# Patient Record
Sex: FEMALE | Race: WHITE | Employment: OTHER | URBAN - METROPOLITAN AREA
[De-identification: names, ages, dates, MRNs, and addresses within clinical notes are randomized per-mention and may not be internally consistent; named-entity substitution may affect disease eponyms.]

---

## 2017-01-06 ENCOUNTER — OFFICE VISIT (OUTPATIENT)
Dept: FAMILY MEDICINE CLINIC | Age: 62
End: 2017-01-06

## 2017-01-06 VITALS
DIASTOLIC BLOOD PRESSURE: 70 MMHG | BODY MASS INDEX: 36.09 KG/M2 | SYSTOLIC BLOOD PRESSURE: 132 MMHG | OXYGEN SATURATION: 97 % | HEART RATE: 79 BPM | WEIGHT: 191 LBS

## 2017-01-06 DIAGNOSIS — M25.461 KNEE EFFUSION, RIGHT: Primary | ICD-10-CM

## 2017-01-06 PROCEDURE — 20610 DRAIN/INJ JOINT/BURSA W/O US: CPT | Performed by: FAMILY MEDICINE

## 2017-01-06 RX ORDER — TRIAMCINOLONE ACETONIDE 40 MG/ML
40 INJECTION, SUSPENSION INTRA-ARTICULAR; INTRAMUSCULAR ONCE
Status: COMPLETED | OUTPATIENT
Start: 2017-01-06 | End: 2017-01-06

## 2017-01-06 RX ORDER — TRAMADOL HYDROCHLORIDE 50 MG/1
TABLET ORAL
Qty: 90 TABLET | Refills: 2 | Status: SHIPPED | OUTPATIENT
Start: 2017-01-06 | End: 2017-06-06 | Stop reason: SDUPTHER

## 2017-01-06 RX ADMIN — TRIAMCINOLONE ACETONIDE 40 MG: 40 INJECTION, SUSPENSION INTRA-ARTICULAR; INTRAMUSCULAR at 10:27

## 2017-01-25 ENCOUNTER — OFFICE VISIT (OUTPATIENT)
Dept: ORTHOPEDIC SURGERY | Age: 62
End: 2017-01-25

## 2017-01-25 VITALS
HEART RATE: 69 BPM | BODY MASS INDEX: 36.09 KG/M2 | DIASTOLIC BLOOD PRESSURE: 67 MMHG | WEIGHT: 191 LBS | SYSTOLIC BLOOD PRESSURE: 107 MMHG

## 2017-01-25 DIAGNOSIS — G56.03 BILATERAL CARPAL TUNNEL SYNDROME: Primary | ICD-10-CM

## 2017-01-25 PROCEDURE — 99243 OFF/OP CNSLTJ NEW/EST LOW 30: CPT | Performed by: ORTHOPAEDIC SURGERY

## 2017-01-31 ENCOUNTER — OFFICE VISIT (OUTPATIENT)
Dept: FAMILY MEDICINE CLINIC | Age: 62
End: 2017-01-31

## 2017-01-31 VITALS
DIASTOLIC BLOOD PRESSURE: 78 MMHG | BODY MASS INDEX: 35.3 KG/M2 | SYSTOLIC BLOOD PRESSURE: 126 MMHG | HEIGHT: 61 IN | WEIGHT: 187 LBS | OXYGEN SATURATION: 98 % | HEART RATE: 66 BPM

## 2017-01-31 DIAGNOSIS — Z01.818 PRE-OP EVALUATION: Primary | ICD-10-CM

## 2017-01-31 DIAGNOSIS — E78.00 PURE HYPERCHOLESTEROLEMIA: ICD-10-CM

## 2017-01-31 DIAGNOSIS — G47.33 OBSTRUCTIVE SLEEP APNEA SYNDROME: ICD-10-CM

## 2017-01-31 DIAGNOSIS — E11.9 TYPE 2 DIABETES MELLITUS WITHOUT COMPLICATION, WITHOUT LONG-TERM CURRENT USE OF INSULIN (HCC): ICD-10-CM

## 2017-01-31 PROCEDURE — 99213 OFFICE O/P EST LOW 20 MIN: CPT | Performed by: FAMILY MEDICINE

## 2017-01-31 PROCEDURE — 93000 ELECTROCARDIOGRAM COMPLETE: CPT | Performed by: FAMILY MEDICINE

## 2017-02-06 RX ORDER — LISINOPRIL 10 MG/1
TABLET ORAL
Qty: 90 TABLET | Refills: 1 | Status: SHIPPED | OUTPATIENT
Start: 2017-02-06 | End: 2017-08-01 | Stop reason: SDUPTHER

## 2017-02-06 RX ORDER — PRAVASTATIN SODIUM 20 MG
TABLET ORAL
Qty: 90 TABLET | Refills: 1 | Status: SHIPPED | OUTPATIENT
Start: 2017-02-06 | End: 2017-06-13 | Stop reason: SDUPTHER

## 2017-02-06 RX ORDER — OMEPRAZOLE 40 MG/1
40 CAPSULE, DELAYED RELEASE ORAL 2 TIMES DAILY
Qty: 180 CAPSULE | Refills: 1 | Status: SHIPPED | OUTPATIENT
Start: 2017-02-06 | End: 2017-08-01 | Stop reason: SDUPTHER

## 2017-02-14 ENCOUNTER — HOSPITAL ENCOUNTER (OUTPATIENT)
Dept: SURGERY | Age: 62
Discharge: HOME | End: 2017-02-14
Attending: ORTHOPAEDIC SURGERY | Admitting: ORTHOPAEDIC SURGERY

## 2017-02-14 VITALS
RESPIRATION RATE: 18 BRPM | SYSTOLIC BLOOD PRESSURE: 102 MMHG | DIASTOLIC BLOOD PRESSURE: 64 MMHG | TEMPERATURE: 98 F | HEART RATE: 76 BPM | OXYGEN SATURATION: 96 %

## 2017-02-14 LAB
GLUCOSE BLD-MCNC: 114 MG/DL (ref 70–99)
GLUCOSE BLD-MCNC: 126 MG/DL (ref 70–99)
PERFORMED ON: ABNORMAL
PERFORMED ON: ABNORMAL

## 2017-02-14 RX ORDER — MEPERIDINE HYDROCHLORIDE 25 MG/ML
12.5 INJECTION INTRAMUSCULAR; INTRAVENOUS; SUBCUTANEOUS EVERY 5 MIN PRN
Status: DISCONTINUED | OUTPATIENT
Start: 2017-02-14 | End: 2017-02-15 | Stop reason: HOSPADM

## 2017-02-14 RX ORDER — OXYCODONE HYDROCHLORIDE AND ACETAMINOPHEN 5; 325 MG/1; MG/1
1 TABLET ORAL PRN
Status: COMPLETED | OUTPATIENT
Start: 2017-02-14 | End: 2017-02-14

## 2017-02-14 RX ORDER — SODIUM CHLORIDE 9 MG/ML
INJECTION, SOLUTION INTRAVENOUS CONTINUOUS
Status: DISCONTINUED | OUTPATIENT
Start: 2017-02-14 | End: 2017-02-15 | Stop reason: HOSPADM

## 2017-02-14 RX ORDER — SODIUM CHLORIDE 0.9 % (FLUSH) 0.9 %
10 SYRINGE (ML) INJECTION EVERY 12 HOURS SCHEDULED
Status: DISCONTINUED | OUTPATIENT
Start: 2017-02-14 | End: 2017-02-15 | Stop reason: HOSPADM

## 2017-02-14 RX ORDER — OXYCODONE HYDROCHLORIDE AND ACETAMINOPHEN 5; 325 MG/1; MG/1
2 TABLET ORAL PRN
Status: COMPLETED | OUTPATIENT
Start: 2017-02-14 | End: 2017-02-14

## 2017-02-14 RX ORDER — MORPHINE SULFATE 2 MG/ML
1 INJECTION, SOLUTION INTRAMUSCULAR; INTRAVENOUS EVERY 5 MIN PRN
Status: DISCONTINUED | OUTPATIENT
Start: 2017-02-14 | End: 2017-02-15 | Stop reason: HOSPADM

## 2017-02-14 RX ORDER — SODIUM CHLORIDE 0.9 % (FLUSH) 0.9 %
10 SYRINGE (ML) INJECTION PRN
Status: DISCONTINUED | OUTPATIENT
Start: 2017-02-14 | End: 2017-02-15 | Stop reason: HOSPADM

## 2017-02-14 RX ORDER — MORPHINE SULFATE 2 MG/ML
2 INJECTION, SOLUTION INTRAMUSCULAR; INTRAVENOUS EVERY 5 MIN PRN
Status: DISCONTINUED | OUTPATIENT
Start: 2017-02-14 | End: 2017-02-15 | Stop reason: HOSPADM

## 2017-02-14 RX ORDER — ONDANSETRON 2 MG/ML
4 INJECTION INTRAMUSCULAR; INTRAVENOUS
Status: ACTIVE | OUTPATIENT
Start: 2017-02-14 | End: 2017-02-14

## 2017-02-14 RX ORDER — FENTANYL CITRATE 50 UG/ML
25 INJECTION, SOLUTION INTRAMUSCULAR; INTRAVENOUS EVERY 5 MIN PRN
Status: DISCONTINUED | OUTPATIENT
Start: 2017-02-14 | End: 2017-02-15 | Stop reason: HOSPADM

## 2017-02-14 RX ORDER — FENTANYL CITRATE 50 UG/ML
50 INJECTION, SOLUTION INTRAMUSCULAR; INTRAVENOUS EVERY 5 MIN PRN
Status: DISCONTINUED | OUTPATIENT
Start: 2017-02-14 | End: 2017-02-15 | Stop reason: HOSPADM

## 2017-02-14 RX ADMIN — OXYCODONE HYDROCHLORIDE AND ACETAMINOPHEN 2 TABLET: 5; 325 TABLET ORAL at 14:32

## 2017-02-14 RX ADMIN — SODIUM CHLORIDE: 9 INJECTION, SOLUTION INTRAVENOUS at 11:38

## 2017-02-14 ASSESSMENT — PAIN - FUNCTIONAL ASSESSMENT: PAIN_FUNCTIONAL_ASSESSMENT: 0-10

## 2017-02-14 ASSESSMENT — ENCOUNTER SYMPTOMS: SHORTNESS OF BREATH: 0

## 2017-02-14 ASSESSMENT — PAIN SCALES - GENERAL: PAINLEVEL_OUTOF10: 9

## 2017-02-15 ENCOUNTER — TELEPHONE (OUTPATIENT)
Dept: ORTHOPEDIC SURGERY | Age: 62
End: 2017-02-15

## 2017-02-22 ENCOUNTER — OFFICE VISIT (OUTPATIENT)
Dept: ORTHOPEDIC SURGERY | Age: 62
End: 2017-02-22

## 2017-02-22 VITALS — RESPIRATION RATE: 16 BRPM | BODY MASS INDEX: 34.36 KG/M2 | WEIGHT: 182 LBS | HEIGHT: 61 IN

## 2017-02-22 DIAGNOSIS — G56.03 BILATERAL CARPAL TUNNEL SYNDROME: Primary | ICD-10-CM

## 2017-02-22 PROCEDURE — 99024 POSTOP FOLLOW-UP VISIT: CPT | Performed by: PHYSICIAN ASSISTANT

## 2017-03-03 ENCOUNTER — HOSPITAL ENCOUNTER (OUTPATIENT)
Dept: OTHER | Age: 62
Discharge: HOME | End: 2017-03-03
Attending: FAMILY MEDICINE | Admitting: FAMILY MEDICINE

## 2017-03-03 ENCOUNTER — TELEPHONE (OUTPATIENT)
Dept: FAMILY MEDICINE CLINIC | Age: 62
End: 2017-03-03

## 2017-03-03 DIAGNOSIS — E11.9 TYPE 2 DIABETES MELLITUS WITHOUT COMPLICATION, WITHOUT LONG-TERM CURRENT USE OF INSULIN (HCC): Primary | ICD-10-CM

## 2017-03-03 DIAGNOSIS — E11.9 TYPE 2 DIABETES MELLITUS WITHOUT COMPLICATION, WITHOUT LONG-TERM CURRENT USE OF INSULIN (HCC): ICD-10-CM

## 2017-03-03 LAB
ANION GAP SERPL CALCULATED.3IONS-SCNC: 13 MMOL/L (ref 3–16)
BUN BLDV-MCNC: 18 MG/DL (ref 7–20)
CALCIUM SERPL-MCNC: 9.9 MG/DL (ref 8.3–10.6)
CHLORIDE BLD-SCNC: 100 MMOL/L (ref 99–110)
CO2: 27 MMOL/L (ref 21–32)
CREAT SERPL-MCNC: 0.9 MG/DL (ref 0.6–1.1)
ESTIMATED AVERAGE GLUCOSE: 137 MG/DL
GFR AFRICAN AMERICAN: >60
GFR NON-AFRICAN AMERICAN: >60
GLUCOSE BLD-MCNC: 122 MG/DL (ref 70–99)
HBA1C MFR BLD: 6.4 %
POTASSIUM SERPL-SCNC: 4.5 MMOL/L (ref 3.5–5.1)
SODIUM BLD-SCNC: 140 MMOL/L (ref 136–145)

## 2017-03-06 ENCOUNTER — OFFICE VISIT (OUTPATIENT)
Dept: FAMILY MEDICINE CLINIC | Age: 62
End: 2017-03-06

## 2017-03-06 VITALS
WEIGHT: 190 LBS | HEIGHT: 61 IN | BODY MASS INDEX: 35.87 KG/M2 | OXYGEN SATURATION: 97 % | HEART RATE: 85 BPM | DIASTOLIC BLOOD PRESSURE: 70 MMHG | SYSTOLIC BLOOD PRESSURE: 112 MMHG

## 2017-03-06 DIAGNOSIS — Z01.818 PRE-OP EXAM: Primary | ICD-10-CM

## 2017-03-06 DIAGNOSIS — F32.5 MAJOR DEPRESSIVE DISORDER WITH SINGLE EPISODE, IN FULL REMISSION (HCC): ICD-10-CM

## 2017-03-06 DIAGNOSIS — G56.03 BILATERAL CARPAL TUNNEL SYNDROME: ICD-10-CM

## 2017-03-06 DIAGNOSIS — E11.9 TYPE 2 DIABETES MELLITUS WITHOUT COMPLICATION, WITHOUT LONG-TERM CURRENT USE OF INSULIN (HCC): ICD-10-CM

## 2017-03-06 PROCEDURE — 99214 OFFICE O/P EST MOD 30 MIN: CPT | Performed by: FAMILY MEDICINE

## 2017-03-06 RX ORDER — CITALOPRAM 10 MG/1
10 TABLET ORAL DAILY
Qty: 90 TABLET | Refills: 3 | Status: SHIPPED | OUTPATIENT
Start: 2017-03-06 | End: 2018-03-09 | Stop reason: SDUPTHER

## 2017-03-10 ENCOUNTER — PAT TELEPHONE (OUTPATIENT)
Dept: PREADMISSION TESTING | Age: 62
End: 2017-03-10

## 2017-03-10 VITALS — BODY MASS INDEX: 35.87 KG/M2 | WEIGHT: 190 LBS | HEIGHT: 61 IN

## 2017-03-10 ASSESSMENT — PAIN DESCRIPTION - DESCRIPTORS: DESCRIPTORS: ACHING

## 2017-03-10 ASSESSMENT — PAIN - FUNCTIONAL ASSESSMENT: PAIN_FUNCTIONAL_ASSESSMENT: 0-10

## 2017-03-10 ASSESSMENT — PAIN DESCRIPTION - LOCATION: LOCATION: HAND

## 2017-03-10 ASSESSMENT — PAIN SCALES - GENERAL: PAINLEVEL_OUTOF10: 5

## 2017-03-10 ASSESSMENT — PAIN DESCRIPTION - ORIENTATION: ORIENTATION: LEFT

## 2017-03-14 ENCOUNTER — TELEPHONE (OUTPATIENT)
Dept: ORTHOPEDIC SURGERY | Age: 62
End: 2017-03-14

## 2017-03-14 ENCOUNTER — HOSPITAL ENCOUNTER (OUTPATIENT)
Dept: SURGERY | Age: 62
Discharge: HOME | End: 2017-03-14
Attending: ORTHOPAEDIC SURGERY | Admitting: ORTHOPAEDIC SURGERY

## 2017-03-14 VITALS
DIASTOLIC BLOOD PRESSURE: 65 MMHG | OXYGEN SATURATION: 92 % | HEART RATE: 78 BPM | RESPIRATION RATE: 16 BRPM | WEIGHT: 193.56 LBS | HEIGHT: 61 IN | SYSTOLIC BLOOD PRESSURE: 116 MMHG | BODY MASS INDEX: 36.55 KG/M2 | TEMPERATURE: 97.8 F

## 2017-03-14 LAB
ANION GAP SERPL CALCULATED.3IONS-SCNC: 12 MMOL/L (ref 3–16)
BUN BLDV-MCNC: 24 MG/DL (ref 7–20)
CALCIUM SERPL-MCNC: 10 MG/DL (ref 8.3–10.6)
CHLORIDE BLD-SCNC: 99 MMOL/L (ref 99–110)
CO2: 27 MMOL/L (ref 21–32)
CREAT SERPL-MCNC: 0.9 MG/DL (ref 0.6–1.1)
GFR AFRICAN AMERICAN: >60
GFR NON-AFRICAN AMERICAN: >60
GLUCOSE BLD-MCNC: 121 MG/DL (ref 70–99)
GLUCOSE BLD-MCNC: 131 MG/DL (ref 70–99)
PERFORMED ON: ABNORMAL
POTASSIUM SERPL-SCNC: 4.7 MMOL/L (ref 3.5–5.1)
SODIUM BLD-SCNC: 138 MMOL/L (ref 136–145)

## 2017-03-14 RX ORDER — FENTANYL CITRATE 50 UG/ML
50 INJECTION, SOLUTION INTRAMUSCULAR; INTRAVENOUS EVERY 5 MIN PRN
Status: DISCONTINUED | OUTPATIENT
Start: 2017-03-14 | End: 2017-03-15 | Stop reason: HOSPADM

## 2017-03-14 RX ORDER — OXYCODONE HYDROCHLORIDE AND ACETAMINOPHEN 5; 325 MG/1; MG/1
2 TABLET ORAL PRN
Status: COMPLETED | OUTPATIENT
Start: 2017-03-14 | End: 2017-03-14

## 2017-03-14 RX ORDER — SODIUM CHLORIDE 9 MG/ML
INJECTION, SOLUTION INTRAVENOUS CONTINUOUS
Status: DISCONTINUED | OUTPATIENT
Start: 2017-03-14 | End: 2017-03-15 | Stop reason: HOSPADM

## 2017-03-14 RX ORDER — FENTANYL CITRATE 50 UG/ML
25 INJECTION, SOLUTION INTRAMUSCULAR; INTRAVENOUS EVERY 5 MIN PRN
Status: DISCONTINUED | OUTPATIENT
Start: 2017-03-14 | End: 2017-03-15 | Stop reason: HOSPADM

## 2017-03-14 RX ORDER — SODIUM CHLORIDE 0.9 % (FLUSH) 0.9 %
10 SYRINGE (ML) INJECTION EVERY 12 HOURS SCHEDULED
Status: DISCONTINUED | OUTPATIENT
Start: 2017-03-14 | End: 2017-03-15 | Stop reason: HOSPADM

## 2017-03-14 RX ORDER — MORPHINE SULFATE 2 MG/ML
2 INJECTION, SOLUTION INTRAMUSCULAR; INTRAVENOUS EVERY 5 MIN PRN
Status: DISCONTINUED | OUTPATIENT
Start: 2017-03-14 | End: 2017-03-15 | Stop reason: HOSPADM

## 2017-03-14 RX ORDER — MEPERIDINE HYDROCHLORIDE 25 MG/ML
12.5 INJECTION INTRAMUSCULAR; INTRAVENOUS; SUBCUTANEOUS EVERY 5 MIN PRN
Status: DISCONTINUED | OUTPATIENT
Start: 2017-03-14 | End: 2017-03-15 | Stop reason: HOSPADM

## 2017-03-14 RX ORDER — SODIUM CHLORIDE 0.9 % (FLUSH) 0.9 %
10 SYRINGE (ML) INJECTION PRN
Status: DISCONTINUED | OUTPATIENT
Start: 2017-03-14 | End: 2017-03-15 | Stop reason: HOSPADM

## 2017-03-14 RX ORDER — MORPHINE SULFATE 2 MG/ML
1 INJECTION, SOLUTION INTRAMUSCULAR; INTRAVENOUS EVERY 5 MIN PRN
Status: DISCONTINUED | OUTPATIENT
Start: 2017-03-14 | End: 2017-03-15 | Stop reason: HOSPADM

## 2017-03-14 RX ORDER — ONDANSETRON 2 MG/ML
4 INJECTION INTRAMUSCULAR; INTRAVENOUS
Status: ACTIVE | OUTPATIENT
Start: 2017-03-14 | End: 2017-03-14

## 2017-03-14 RX ORDER — OXYCODONE HYDROCHLORIDE AND ACETAMINOPHEN 5; 325 MG/1; MG/1
1 TABLET ORAL PRN
Status: COMPLETED | OUTPATIENT
Start: 2017-03-14 | End: 2017-03-14

## 2017-03-14 RX ADMIN — SODIUM CHLORIDE: 9 INJECTION, SOLUTION INTRAVENOUS at 08:24

## 2017-03-14 RX ADMIN — OXYCODONE HYDROCHLORIDE AND ACETAMINOPHEN 2 TABLET: 5; 325 TABLET ORAL at 10:41

## 2017-03-14 ASSESSMENT — PAIN - FUNCTIONAL ASSESSMENT: PAIN_FUNCTIONAL_ASSESSMENT: 0-10

## 2017-03-14 ASSESSMENT — ENCOUNTER SYMPTOMS: SHORTNESS OF BREATH: 0

## 2017-03-14 ASSESSMENT — PAIN SCALES - GENERAL: PAINLEVEL_OUTOF10: 8

## 2017-03-22 ENCOUNTER — OFFICE VISIT (OUTPATIENT)
Dept: ORTHOPEDIC SURGERY | Age: 62
End: 2017-03-22

## 2017-03-22 VITALS — HEIGHT: 61 IN | RESPIRATION RATE: 16 BRPM | WEIGHT: 193 LBS | BODY MASS INDEX: 36.44 KG/M2

## 2017-03-22 DIAGNOSIS — G56.03 BILATERAL CARPAL TUNNEL SYNDROME: Primary | ICD-10-CM

## 2017-03-22 PROCEDURE — 99024 POSTOP FOLLOW-UP VISIT: CPT | Performed by: PHYSICIAN ASSISTANT

## 2017-03-24 ENCOUNTER — TELEPHONE (OUTPATIENT)
Dept: ORTHOPEDIC SURGERY | Age: 62
End: 2017-03-24

## 2017-03-27 ENCOUNTER — TELEPHONE (OUTPATIENT)
Dept: ORTHOPEDIC SURGERY | Age: 62
End: 2017-03-27

## 2017-04-03 ENCOUNTER — TELEPHONE (OUTPATIENT)
Dept: ORTHOPEDIC SURGERY | Age: 62
End: 2017-04-03

## 2017-04-03 ENCOUNTER — TELEPHONE (OUTPATIENT)
Dept: FAMILY MEDICINE CLINIC | Age: 62
End: 2017-04-03

## 2017-04-03 DIAGNOSIS — G89.29 CHRONIC PAIN OF RIGHT KNEE: Primary | ICD-10-CM

## 2017-04-03 DIAGNOSIS — M25.561 CHRONIC PAIN OF RIGHT KNEE: Primary | ICD-10-CM

## 2017-04-05 ENCOUNTER — TELEPHONE (OUTPATIENT)
Dept: FAMILY MEDICINE CLINIC | Age: 62
End: 2017-04-05

## 2017-04-05 RX ORDER — ALPRAZOLAM 1 MG/1
1 TABLET ORAL SEE ADMIN INSTRUCTIONS
Qty: 1 TABLET | Refills: 0 | OUTPATIENT
Start: 2017-04-05 | End: 2017-04-06

## 2017-04-06 ENCOUNTER — TELEPHONE (OUTPATIENT)
Dept: ORTHOPEDIC SURGERY | Age: 62
End: 2017-04-06

## 2017-04-07 ENCOUNTER — HOSPITAL ENCOUNTER (OUTPATIENT)
Dept: MRI IMAGING | Age: 62
Discharge: HOME | End: 2017-04-07
Attending: FAMILY MEDICINE | Admitting: FAMILY MEDICINE

## 2017-04-07 DIAGNOSIS — G89.29 CHRONIC PAIN OF RIGHT KNEE: ICD-10-CM

## 2017-04-07 DIAGNOSIS — M25.561 CHRONIC PAIN OF RIGHT KNEE: ICD-10-CM

## 2017-04-07 DIAGNOSIS — M25.561 PAIN IN RIGHT KNEE: ICD-10-CM

## 2017-04-10 ENCOUNTER — OFFICE VISIT (OUTPATIENT)
Dept: ORTHOPEDIC SURGERY | Age: 62
End: 2017-04-10

## 2017-04-10 VITALS
HEIGHT: 62 IN | DIASTOLIC BLOOD PRESSURE: 79 MMHG | HEART RATE: 73 BPM | BODY MASS INDEX: 36.07 KG/M2 | WEIGHT: 196 LBS | SYSTOLIC BLOOD PRESSURE: 120 MMHG

## 2017-04-10 DIAGNOSIS — S83.231A COMPLEX TEAR OF MEDIAL MENISCUS OF RIGHT KNEE AS CURRENT INJURY, INITIAL ENCOUNTER: ICD-10-CM

## 2017-04-10 DIAGNOSIS — M25.561 CHRONIC PAIN OF RIGHT KNEE: Primary | ICD-10-CM

## 2017-04-10 DIAGNOSIS — G89.29 CHRONIC PAIN OF RIGHT KNEE: Primary | ICD-10-CM

## 2017-04-10 PROCEDURE — 73564 X-RAY EXAM KNEE 4 OR MORE: CPT | Performed by: ORTHOPAEDIC SURGERY

## 2017-04-10 PROCEDURE — 99213 OFFICE O/P EST LOW 20 MIN: CPT | Performed by: ORTHOPAEDIC SURGERY

## 2017-04-12 ENCOUNTER — OFFICE VISIT (OUTPATIENT)
Dept: FAMILY MEDICINE CLINIC | Age: 62
End: 2017-04-12

## 2017-04-12 VITALS
WEIGHT: 194 LBS | TEMPERATURE: 99.6 F | OXYGEN SATURATION: 98 % | DIASTOLIC BLOOD PRESSURE: 78 MMHG | SYSTOLIC BLOOD PRESSURE: 110 MMHG | HEIGHT: 61 IN | HEART RATE: 80 BPM | BODY MASS INDEX: 36.63 KG/M2

## 2017-04-12 DIAGNOSIS — E78.00 PURE HYPERCHOLESTEROLEMIA: ICD-10-CM

## 2017-04-12 DIAGNOSIS — Z01.818 PRE-OP EXAM: ICD-10-CM

## 2017-04-12 DIAGNOSIS — E11.9 TYPE 2 DIABETES MELLITUS WITHOUT COMPLICATION, WITHOUT LONG-TERM CURRENT USE OF INSULIN (HCC): Primary | ICD-10-CM

## 2017-04-12 DIAGNOSIS — G47.33 OBSTRUCTIVE SLEEP APNEA SYNDROME: ICD-10-CM

## 2017-04-12 DIAGNOSIS — K21.00 GASTROESOPHAGEAL REFLUX DISEASE WITH ESOPHAGITIS: ICD-10-CM

## 2017-04-12 PROCEDURE — 99214 OFFICE O/P EST MOD 30 MIN: CPT | Performed by: FAMILY MEDICINE

## 2017-04-13 ENCOUNTER — HOSPITAL ENCOUNTER (OUTPATIENT)
Dept: SURGERY | Age: 62
Discharge: HOME | End: 2017-04-13
Attending: ORTHOPAEDIC SURGERY | Admitting: ORTHOPAEDIC SURGERY

## 2017-04-13 VITALS
BODY MASS INDEX: 35.39 KG/M2 | HEART RATE: 77 BPM | HEIGHT: 62 IN | TEMPERATURE: 97.6 F | WEIGHT: 192.3 LBS | SYSTOLIC BLOOD PRESSURE: 110 MMHG | DIASTOLIC BLOOD PRESSURE: 78 MMHG | RESPIRATION RATE: 16 BRPM | OXYGEN SATURATION: 98 %

## 2017-04-13 LAB
A/G RATIO: 1.3 (ref 1.1–2.2)
ALBUMIN SERPL-MCNC: 4.3 G/DL (ref 3.4–5)
ALP BLD-CCNC: 79 U/L (ref 40–129)
ALT SERPL-CCNC: 19 U/L (ref 10–40)
ANION GAP SERPL CALCULATED.3IONS-SCNC: 12 MMOL/L (ref 3–16)
AST SERPL-CCNC: 23 U/L (ref 15–37)
BILIRUB SERPL-MCNC: 0.4 MG/DL (ref 0–1)
BUN BLDV-MCNC: 20 MG/DL (ref 7–20)
CALCIUM SERPL-MCNC: 9.9 MG/DL (ref 8.3–10.6)
CHLORIDE BLD-SCNC: 100 MMOL/L (ref 99–110)
CO2: 26 MMOL/L (ref 21–32)
CREAT SERPL-MCNC: 0.9 MG/DL (ref 0.6–1.1)
GFR AFRICAN AMERICAN: >60
GFR NON-AFRICAN AMERICAN: >60
GLOBULIN: 3.3 G/DL
GLUCOSE BLD-MCNC: 145 MG/DL (ref 70–99)
GLUCOSE BLD-MCNC: 150 MG/DL (ref 70–99)
GLUCOSE BLD-MCNC: 150 MG/DL (ref 70–99)
HCT VFR BLD CALC: 33.6 % (ref 36–48)
HEMOGLOBIN: 11.5 G/DL (ref 12–16)
MCH RBC QN AUTO: 29.8 PG (ref 26–34)
MCHC RBC AUTO-ENTMCNC: 34.3 G/DL (ref 31–36)
MCV RBC AUTO: 87 FL (ref 80–100)
PDW BLD-RTO: 13.1 % (ref 12.4–15.4)
PERFORMED ON: ABNORMAL
PERFORMED ON: ABNORMAL
PLATELET # BLD: 233 K/UL (ref 135–450)
PMV BLD AUTO: 8.3 FL (ref 5–10.5)
POTASSIUM SERPL-SCNC: 4.6 MMOL/L (ref 3.5–5.1)
RBC # BLD: 3.86 M/UL (ref 4–5.2)
SODIUM BLD-SCNC: 138 MMOL/L (ref 136–145)
TOTAL PROTEIN: 7.6 G/DL (ref 6.4–8.2)
WBC # BLD: 7.1 K/UL (ref 4–11)

## 2017-04-13 RX ORDER — HYDROCODONE BITARTRATE AND ACETAMINOPHEN 5; 325 MG/1; MG/1
1 TABLET ORAL EVERY 4 HOURS PRN
Qty: 40 TABLET | Refills: 0 | Status: SHIPPED | OUTPATIENT
Start: 2017-04-13 | End: 2017-04-20

## 2017-04-13 RX ORDER — METFORMIN HYDROCHLORIDE 500 MG/1
500 TABLET, EXTENDED RELEASE ORAL
Qty: 90 TABLET | Refills: 3 | Status: SHIPPED | OUTPATIENT
Start: 2017-04-15 | End: 2017-06-13 | Stop reason: SDUPTHER

## 2017-04-13 RX ORDER — METOCLOPRAMIDE HYDROCHLORIDE 5 MG/ML
10 INJECTION INTRAMUSCULAR; INTRAVENOUS
Status: ACTIVE | OUTPATIENT
Start: 2017-04-13 | End: 2017-04-13

## 2017-04-13 RX ORDER — FENTANYL CITRATE 50 UG/ML
25 INJECTION, SOLUTION INTRAMUSCULAR; INTRAVENOUS EVERY 5 MIN PRN
Status: DISCONTINUED | OUTPATIENT
Start: 2017-04-13 | End: 2017-04-14 | Stop reason: HOSPADM

## 2017-04-13 RX ORDER — PROMETHAZINE HYDROCHLORIDE 25 MG/ML
6.25 INJECTION, SOLUTION INTRAMUSCULAR; INTRAVENOUS
Status: ACTIVE | OUTPATIENT
Start: 2017-04-13 | End: 2017-04-13

## 2017-04-13 RX ORDER — LIDOCAINE HYDROCHLORIDE 10 MG/ML
0.5 INJECTION, SOLUTION EPIDURAL; INFILTRATION; INTRACAUDAL; PERINEURAL ONCE
Status: COMPLETED | OUTPATIENT
Start: 2017-04-13 | End: 2017-04-13

## 2017-04-13 RX ORDER — CEFAZOLIN SODIUM 2 G/100ML
2 INJECTION, SOLUTION INTRAVENOUS
Status: COMPLETED | OUTPATIENT
Start: 2017-04-13 | End: 2017-04-13

## 2017-04-13 RX ORDER — MEPERIDINE HYDROCHLORIDE 25 MG/ML
12.5 INJECTION INTRAMUSCULAR; INTRAVENOUS; SUBCUTANEOUS EVERY 5 MIN PRN
Status: DISCONTINUED | OUTPATIENT
Start: 2017-04-13 | End: 2017-04-14 | Stop reason: HOSPADM

## 2017-04-13 RX ORDER — ENALAPRILAT 2.5 MG/2ML
1.25 INJECTION INTRAVENOUS
Status: ACTIVE | OUTPATIENT
Start: 2017-04-13 | End: 2017-04-13

## 2017-04-13 RX ORDER — SODIUM CHLORIDE 9 MG/ML
INJECTION, SOLUTION INTRAVENOUS CONTINUOUS
Status: DISCONTINUED | OUTPATIENT
Start: 2017-04-13 | End: 2017-04-14 | Stop reason: HOSPADM

## 2017-04-13 RX ORDER — HYDROMORPHONE HCL 110MG/55ML
0.25 PATIENT CONTROLLED ANALGESIA SYRINGE INTRAVENOUS
Status: DISCONTINUED | OUTPATIENT
Start: 2017-04-13 | End: 2017-04-14 | Stop reason: HOSPADM

## 2017-04-13 RX ORDER — HYDROMORPHONE HCL 110MG/55ML
0.5 PATIENT CONTROLLED ANALGESIA SYRINGE INTRAVENOUS
Status: DISCONTINUED | OUTPATIENT
Start: 2017-04-13 | End: 2017-04-14 | Stop reason: HOSPADM

## 2017-04-13 RX ORDER — FENTANYL CITRATE 50 UG/ML
50 INJECTION, SOLUTION INTRAMUSCULAR; INTRAVENOUS EVERY 5 MIN PRN
Status: DISCONTINUED | OUTPATIENT
Start: 2017-04-13 | End: 2017-04-14 | Stop reason: HOSPADM

## 2017-04-13 RX ADMIN — SODIUM CHLORIDE: 9 INJECTION, SOLUTION INTRAVENOUS at 06:59

## 2017-04-13 RX ADMIN — FENTANYL CITRATE 25 MCG: 50 INJECTION, SOLUTION INTRAMUSCULAR; INTRAVENOUS at 08:34

## 2017-04-13 RX ADMIN — CEFAZOLIN SODIUM 2 G: 2 INJECTION, SOLUTION INTRAVENOUS at 07:26

## 2017-04-13 RX ADMIN — LIDOCAINE HYDROCHLORIDE 0.2 ML: 10 INJECTION, SOLUTION EPIDURAL; INFILTRATION; INTRACAUDAL; PERINEURAL at 06:59

## 2017-04-13 ASSESSMENT — PAIN SCALES - GENERAL
PAINLEVEL_OUTOF10: 1
PAINLEVEL_OUTOF10: 2
PAINLEVEL_OUTOF10: 4
PAINLEVEL_OUTOF10: 6

## 2017-04-13 ASSESSMENT — PAIN DESCRIPTION - DESCRIPTORS: DESCRIPTORS: ACHING;CONSTANT

## 2017-04-13 ASSESSMENT — PAIN DESCRIPTION - PAIN TYPE
TYPE: SURGICAL PAIN

## 2017-04-13 ASSESSMENT — ENCOUNTER SYMPTOMS: SHORTNESS OF BREATH: 0

## 2017-04-13 ASSESSMENT — PAIN - FUNCTIONAL ASSESSMENT: PAIN_FUNCTIONAL_ASSESSMENT: 0-10

## 2017-04-17 ENCOUNTER — TELEPHONE (OUTPATIENT)
Dept: ORTHOPEDIC SURGERY | Age: 62
End: 2017-04-17

## 2017-04-28 ENCOUNTER — OFFICE VISIT (OUTPATIENT)
Dept: ORTHOPEDIC SURGERY | Age: 62
End: 2017-04-28

## 2017-04-28 VITALS — HEIGHT: 61 IN | WEIGHT: 196 LBS | BODY MASS INDEX: 37 KG/M2

## 2017-04-28 DIAGNOSIS — S83.231A COMPLEX TEAR OF MEDIAL MENISCUS OF RIGHT KNEE AS CURRENT INJURY, INITIAL ENCOUNTER: Primary | ICD-10-CM

## 2017-04-28 PROCEDURE — 99024 POSTOP FOLLOW-UP VISIT: CPT | Performed by: ORTHOPAEDIC SURGERY

## 2017-04-28 RX ORDER — OXYBUTYNIN CHLORIDE 5 MG/1
5 TABLET ORAL 2 TIMES DAILY
COMMUNITY
End: 2017-06-13 | Stop reason: SDUPTHER

## 2017-04-28 RX ORDER — PRAVASTATIN SODIUM 20 MG
20 TABLET ORAL DAILY
COMMUNITY
End: 2017-08-01 | Stop reason: SDUPTHER

## 2017-04-28 RX ORDER — OMEPRAZOLE 20 MG/1
20 TABLET, DELAYED RELEASE ORAL 2 TIMES DAILY
COMMUNITY
End: 2017-06-13 | Stop reason: SDUPTHER

## 2017-05-03 ENCOUNTER — HOSPITAL ENCOUNTER (OUTPATIENT)
Dept: PHYSICAL THERAPY | Age: 62
Discharge: HOME | End: 2017-05-31
Admitting: ORTHOPAEDIC SURGERY

## 2017-05-09 ENCOUNTER — TELEPHONE (OUTPATIENT)
Dept: ORTHOPEDIC SURGERY | Age: 62
End: 2017-05-09

## 2017-05-17 ENCOUNTER — HOSPITAL ENCOUNTER (OUTPATIENT)
Dept: PHYSICAL THERAPY | Age: 62
Discharge: HOME | End: 2017-05-17
Admitting: ORTHOPAEDIC SURGERY

## 2017-05-19 ENCOUNTER — OFFICE VISIT (OUTPATIENT)
Dept: ORTHOPEDIC SURGERY | Age: 62
End: 2017-05-19

## 2017-05-19 ENCOUNTER — TELEPHONE (OUTPATIENT)
Dept: ORTHOPEDIC SURGERY | Age: 62
End: 2017-05-19

## 2017-05-19 VITALS
HEART RATE: 74 BPM | HEIGHT: 63 IN | DIASTOLIC BLOOD PRESSURE: 76 MMHG | SYSTOLIC BLOOD PRESSURE: 124 MMHG | BODY MASS INDEX: 33.66 KG/M2 | WEIGHT: 190 LBS

## 2017-05-19 DIAGNOSIS — S83.231D COMPLEX TEAR OF MEDIAL MENISCUS OF RIGHT KNEE AS CURRENT INJURY, SUBSEQUENT ENCOUNTER: Primary | ICD-10-CM

## 2017-05-19 PROCEDURE — 99024 POSTOP FOLLOW-UP VISIT: CPT | Performed by: ORTHOPAEDIC SURGERY

## 2017-05-23 ENCOUNTER — TELEPHONE (OUTPATIENT)
Dept: ORTHOPEDIC SURGERY | Age: 62
End: 2017-05-23

## 2017-05-24 ENCOUNTER — HOSPITAL ENCOUNTER (OUTPATIENT)
Dept: PHYSICAL THERAPY | Age: 62
Discharge: HOME | End: 2017-05-24
Admitting: ORTHOPAEDIC SURGERY

## 2017-05-26 ENCOUNTER — TELEPHONE (OUTPATIENT)
Dept: ORTHOPEDIC SURGERY | Age: 62
End: 2017-05-26

## 2017-05-30 ENCOUNTER — TELEPHONE (OUTPATIENT)
Dept: ORTHOPEDIC SURGERY | Age: 62
End: 2017-05-30

## 2017-06-01 ENCOUNTER — HOSPITAL ENCOUNTER (OUTPATIENT)
Dept: PHYSICAL THERAPY | Age: 62
Discharge: HOME | End: 2017-06-01
Admitting: ORTHOPAEDIC SURGERY

## 2017-06-06 RX ORDER — TRAMADOL HYDROCHLORIDE 50 MG/1
TABLET ORAL
Qty: 90 TABLET | Refills: 2 | Status: SHIPPED | OUTPATIENT
Start: 2017-06-06 | End: 2017-09-13 | Stop reason: SDUPTHER

## 2017-06-13 ENCOUNTER — OFFICE VISIT (OUTPATIENT)
Dept: FAMILY MEDICINE CLINIC | Age: 62
End: 2017-06-13

## 2017-06-13 VITALS
SYSTOLIC BLOOD PRESSURE: 122 MMHG | HEART RATE: 95 BPM | OXYGEN SATURATION: 94 % | WEIGHT: 204 LBS | BODY MASS INDEX: 36.14 KG/M2 | DIASTOLIC BLOOD PRESSURE: 74 MMHG

## 2017-06-13 DIAGNOSIS — E11.9 TYPE 2 DIABETES MELLITUS WITHOUT COMPLICATION, WITHOUT LONG-TERM CURRENT USE OF INSULIN (HCC): Primary | ICD-10-CM

## 2017-06-13 DIAGNOSIS — L20.84 INTRINSIC ECZEMA: ICD-10-CM

## 2017-06-13 DIAGNOSIS — E78.00 PURE HYPERCHOLESTEROLEMIA: ICD-10-CM

## 2017-06-13 PROCEDURE — 99214 OFFICE O/P EST MOD 30 MIN: CPT | Performed by: FAMILY MEDICINE

## 2017-06-13 RX ORDER — TRIAMCINOLONE ACETONIDE 1 MG/G
CREAM TOPICAL
Qty: 45 G | Refills: 1 | Status: SHIPPED | OUTPATIENT
Start: 2017-06-13 | End: 2018-10-17

## 2017-06-13 RX ORDER — METFORMIN HYDROCHLORIDE 500 MG/1
500 TABLET, EXTENDED RELEASE ORAL 2 TIMES DAILY
Qty: 180 TABLET | Refills: 3 | Status: SHIPPED | OUTPATIENT
Start: 2017-06-13 | End: 2017-09-13 | Stop reason: SDUPTHER

## 2017-06-13 ASSESSMENT — ENCOUNTER SYMPTOMS
COUGH: 0
CONSTIPATION: 0
TROUBLE SWALLOWING: 0
BACK PAIN: 0
ABDOMINAL PAIN: 0
SHORTNESS OF BREATH: 0

## 2017-06-16 ENCOUNTER — HOSPITAL ENCOUNTER (OUTPATIENT)
Dept: OTHER | Age: 62
Discharge: HOME | End: 2017-06-16
Attending: FAMILY MEDICINE | Admitting: FAMILY MEDICINE

## 2017-06-16 DIAGNOSIS — E11.9 TYPE 2 DIABETES MELLITUS WITHOUT COMPLICATION, WITHOUT LONG-TERM CURRENT USE OF INSULIN (HCC): Primary | ICD-10-CM

## 2017-06-16 LAB
A/G RATIO: 1.3 (ref 1.1–2.2)
ALBUMIN SERPL-MCNC: 4.2 G/DL (ref 3.4–5)
ALP BLD-CCNC: 87 U/L (ref 40–129)
ALT SERPL-CCNC: 26 U/L (ref 10–40)
ANION GAP SERPL CALCULATED.3IONS-SCNC: 11 MMOL/L (ref 3–16)
AST SERPL-CCNC: 25 U/L (ref 15–37)
BILIRUB SERPL-MCNC: 0.3 MG/DL (ref 0–1)
BUN BLDV-MCNC: 18 MG/DL (ref 7–20)
CALCIUM SERPL-MCNC: 9.9 MG/DL (ref 8.3–10.6)
CHLORIDE BLD-SCNC: 98 MMOL/L (ref 99–110)
CHOLESTEROL, TOTAL: 142 MG/DL (ref 0–199)
CO2: 29 MMOL/L (ref 21–32)
CREAT SERPL-MCNC: 0.9 MG/DL (ref 0.6–1.1)
CREATININE URINE: 80 MG/DL (ref 28–259)
ESTIMATED AVERAGE GLUCOSE: 165.7 MG/DL
GFR AFRICAN AMERICAN: >60
GFR NON-AFRICAN AMERICAN: >60
GLOBULIN: 3.2 G/DL
GLUCOSE BLD-MCNC: 153 MG/DL (ref 70–99)
HBA1C MFR BLD: 7.4 %
HDLC SERPL-MCNC: 42 MG/DL (ref 40–60)
LDL CHOLESTEROL CALCULATED: 67 MG/DL
MICROALBUMIN UR-MCNC: <1.2 MG/DL
MICROALBUMIN/CREAT UR-RTO: NORMAL MG/G (ref 0–30)
POTASSIUM SERPL-SCNC: 4.8 MMOL/L (ref 3.5–5.1)
SODIUM BLD-SCNC: 138 MMOL/L (ref 136–145)
TOTAL PROTEIN: 7.4 G/DL (ref 6.4–8.2)
TRIGL SERPL-MCNC: 167 MG/DL (ref 0–150)
VLDLC SERPL CALC-MCNC: 33 MG/DL

## 2017-06-30 ENCOUNTER — OFFICE VISIT (OUTPATIENT)
Dept: ORTHOPEDIC SURGERY | Age: 62
End: 2017-06-30

## 2017-06-30 VITALS
BODY MASS INDEX: 35.33 KG/M2 | WEIGHT: 192 LBS | SYSTOLIC BLOOD PRESSURE: 119 MMHG | DIASTOLIC BLOOD PRESSURE: 68 MMHG | HEIGHT: 62 IN | HEART RATE: 75 BPM

## 2017-06-30 DIAGNOSIS — M17.11 PRIMARY OSTEOARTHRITIS OF RIGHT KNEE: Primary | ICD-10-CM

## 2017-06-30 PROCEDURE — 99024 POSTOP FOLLOW-UP VISIT: CPT | Performed by: ORTHOPAEDIC SURGERY

## 2017-06-30 PROCEDURE — 20610 DRAIN/INJ JOINT/BURSA W/O US: CPT | Performed by: ORTHOPAEDIC SURGERY

## 2017-06-30 RX ORDER — BETAMETHASONE SODIUM PHOSPHATE AND BETAMETHASONE ACETATE 3; 3 MG/ML; MG/ML
12 INJECTION, SUSPENSION INTRA-ARTICULAR; INTRALESIONAL; INTRAMUSCULAR; SOFT TISSUE ONCE
Status: COMPLETED | OUTPATIENT
Start: 2017-06-30 | End: 2017-06-30

## 2017-06-30 RX ADMIN — BETAMETHASONE SODIUM PHOSPHATE AND BETAMETHASONE ACETATE 12 MG: 3; 3 INJECTION, SUSPENSION INTRA-ARTICULAR; INTRALESIONAL; INTRAMUSCULAR; SOFT TISSUE at 16:50

## 2017-07-05 ENCOUNTER — TELEPHONE (OUTPATIENT)
Dept: ORTHOPEDIC SURGERY | Age: 62
End: 2017-07-05

## 2017-08-01 RX ORDER — PRAVASTATIN SODIUM 20 MG
TABLET ORAL
Qty: 90 TABLET | Refills: 0 | Status: SHIPPED | OUTPATIENT
Start: 2017-08-01 | End: 2017-09-13 | Stop reason: SDUPTHER

## 2017-08-01 RX ORDER — LISINOPRIL 10 MG/1
TABLET ORAL
Qty: 90 TABLET | Refills: 0 | Status: SHIPPED | OUTPATIENT
Start: 2017-08-01 | End: 2017-09-13 | Stop reason: SDUPTHER

## 2017-08-01 RX ORDER — OMEPRAZOLE 40 MG/1
CAPSULE, DELAYED RELEASE ORAL
Qty: 180 CAPSULE | Refills: 0 | Status: SHIPPED | OUTPATIENT
Start: 2017-08-01 | End: 2017-11-07 | Stop reason: SDUPTHER

## 2017-08-30 ENCOUNTER — TELEPHONE (OUTPATIENT)
Dept: ORTHOPEDIC SURGERY | Age: 62
End: 2017-08-30

## 2017-09-06 ENCOUNTER — OFFICE VISIT (OUTPATIENT)
Dept: ORTHOPEDIC SURGERY | Age: 62
End: 2017-09-06

## 2017-09-06 VITALS
DIASTOLIC BLOOD PRESSURE: 64 MMHG | HEART RATE: 72 BPM | BODY MASS INDEX: 36.25 KG/M2 | HEIGHT: 62 IN | WEIGHT: 197 LBS | SYSTOLIC BLOOD PRESSURE: 112 MMHG

## 2017-09-06 DIAGNOSIS — M17.11 PRIMARY OSTEOARTHRITIS OF RIGHT KNEE: Primary | ICD-10-CM

## 2017-09-06 PROCEDURE — 20610 DRAIN/INJ JOINT/BURSA W/O US: CPT | Performed by: ORTHOPAEDIC SURGERY

## 2017-09-06 PROCEDURE — 99212 OFFICE O/P EST SF 10 MIN: CPT | Performed by: ORTHOPAEDIC SURGERY

## 2017-09-07 RX ORDER — BETAMETHASONE SODIUM PHOSPHATE AND BETAMETHASONE ACETATE 3; 3 MG/ML; MG/ML
12 INJECTION, SUSPENSION INTRA-ARTICULAR; INTRALESIONAL; INTRAMUSCULAR; SOFT TISSUE ONCE
Status: DISCONTINUED | OUTPATIENT
Start: 2017-09-07 | End: 2020-05-01 | Stop reason: CLARIF

## 2017-09-11 ENCOUNTER — HOSPITAL ENCOUNTER (OUTPATIENT)
Dept: OTHER | Age: 62
Discharge: HOME | End: 2017-09-11
Attending: FAMILY MEDICINE | Admitting: FAMILY MEDICINE

## 2017-09-11 ENCOUNTER — TELEPHONE (OUTPATIENT)
Dept: FAMILY MEDICINE CLINIC | Age: 62
End: 2017-09-11

## 2017-09-11 DIAGNOSIS — E78.5 OTHER AND UNSPECIFIED HYPERLIPIDEMIA: Primary | ICD-10-CM

## 2017-09-11 DIAGNOSIS — D64.9 ANEMIA, UNSPECIFIED TYPE: ICD-10-CM

## 2017-09-11 DIAGNOSIS — E78.5 OTHER AND UNSPECIFIED HYPERLIPIDEMIA: ICD-10-CM

## 2017-09-11 DIAGNOSIS — E11.9 TYPE 2 DIABETES MELLITUS WITHOUT COMPLICATION, WITHOUT LONG-TERM CURRENT USE OF INSULIN (HCC): ICD-10-CM

## 2017-09-11 LAB
A/G RATIO: 1.2 (ref 1.1–2.2)
ALBUMIN SERPL-MCNC: 4.1 G/DL (ref 3.4–5)
ALP BLD-CCNC: 88 U/L (ref 40–129)
ALT SERPL-CCNC: 23 U/L (ref 10–40)
ANION GAP SERPL CALCULATED.3IONS-SCNC: 12 MMOL/L (ref 3–16)
AST SERPL-CCNC: 20 U/L (ref 15–37)
BASOPHILS ABSOLUTE: 0.1 K/UL (ref 0–0.2)
BASOPHILS RELATIVE PERCENT: 0.6 %
BILIRUB SERPL-MCNC: 0.3 MG/DL (ref 0–1)
BUN BLDV-MCNC: 22 MG/DL (ref 7–20)
CALCIUM SERPL-MCNC: 10.5 MG/DL (ref 8.3–10.6)
CHLORIDE BLD-SCNC: 99 MMOL/L (ref 99–110)
CHOLESTEROL, TOTAL: 153 MG/DL (ref 0–199)
CO2: 29 MMOL/L (ref 21–32)
CREAT SERPL-MCNC: 1 MG/DL (ref 0.6–1.2)
EOSINOPHILS ABSOLUTE: 0.2 K/UL (ref 0–0.6)
EOSINOPHILS RELATIVE PERCENT: 1.8 %
GFR AFRICAN AMERICAN: >60
GFR NON-AFRICAN AMERICAN: 57
GLOBULIN: 3.5 G/DL
GLUCOSE BLD-MCNC: 130 MG/DL (ref 70–99)
HCT VFR BLD CALC: 38.2 % (ref 36–48)
HDLC SERPL-MCNC: 49 MG/DL (ref 40–60)
HEMOGLOBIN: 12.8 G/DL (ref 12–16)
LDL CHOLESTEROL CALCULATED: 57 MG/DL
LYMPHOCYTES ABSOLUTE: 2.9 K/UL (ref 1–5.1)
LYMPHOCYTES RELATIVE PERCENT: 25.3 %
MCH RBC QN AUTO: 29.5 PG (ref 26–34)
MCHC RBC AUTO-ENTMCNC: 33.5 G/DL (ref 31–36)
MCV RBC AUTO: 88.1 FL (ref 80–100)
MONOCYTES ABSOLUTE: 0.6 K/UL (ref 0–1.3)
MONOCYTES RELATIVE PERCENT: 5.2 %
NEUTROPHILS ABSOLUTE: 7.7 K/UL (ref 1.7–7.7)
NEUTROPHILS RELATIVE PERCENT: 67.1 %
PDW BLD-RTO: 13.1 % (ref 12.4–15.4)
PLATELET # BLD: 262 K/UL (ref 135–450)
PMV BLD AUTO: 8.7 FL (ref 5–10.5)
POTASSIUM SERPL-SCNC: 4.8 MMOL/L (ref 3.5–5.1)
RBC # BLD: 4.33 M/UL (ref 4–5.2)
SODIUM BLD-SCNC: 140 MMOL/L (ref 136–145)
TOTAL PROTEIN: 7.6 G/DL (ref 6.4–8.2)
TRIGL SERPL-MCNC: 235 MG/DL (ref 0–150)
VLDLC SERPL CALC-MCNC: 47 MG/DL
WBC # BLD: 11.6 K/UL (ref 4–11)

## 2017-09-12 LAB
ESTIMATED AVERAGE GLUCOSE: 162.8 MG/DL
HBA1C MFR BLD: 7.3 %

## 2017-09-13 ENCOUNTER — OFFICE VISIT (OUTPATIENT)
Dept: FAMILY MEDICINE CLINIC | Age: 62
End: 2017-09-13

## 2017-09-13 VITALS
HEART RATE: 79 BPM | WEIGHT: 192 LBS | BODY MASS INDEX: 35.69 KG/M2 | OXYGEN SATURATION: 95 % | SYSTOLIC BLOOD PRESSURE: 108 MMHG | DIASTOLIC BLOOD PRESSURE: 64 MMHG

## 2017-09-13 DIAGNOSIS — M17.0 PRIMARY OSTEOARTHRITIS OF BOTH KNEES: ICD-10-CM

## 2017-09-13 DIAGNOSIS — G47.33 OBSTRUCTIVE SLEEP APNEA SYNDROME: ICD-10-CM

## 2017-09-13 DIAGNOSIS — E11.9 TYPE 2 DIABETES MELLITUS WITHOUT COMPLICATION, WITHOUT LONG-TERM CURRENT USE OF INSULIN (HCC): Primary | ICD-10-CM

## 2017-09-13 DIAGNOSIS — K21.00 GASTROESOPHAGEAL REFLUX DISEASE WITH ESOPHAGITIS: ICD-10-CM

## 2017-09-13 DIAGNOSIS — E78.00 PURE HYPERCHOLESTEROLEMIA: ICD-10-CM

## 2017-09-13 PROCEDURE — 99214 OFFICE O/P EST MOD 30 MIN: CPT | Performed by: FAMILY MEDICINE

## 2017-09-13 RX ORDER — TRAMADOL HYDROCHLORIDE 50 MG/1
TABLET ORAL
Qty: 100 TABLET | Refills: 2 | Status: SHIPPED | OUTPATIENT
Start: 2017-09-13 | End: 2018-02-08 | Stop reason: SDUPTHER

## 2017-09-13 RX ORDER — METFORMIN HYDROCHLORIDE 500 MG/1
500 TABLET, EXTENDED RELEASE ORAL
Qty: 270 TABLET | Refills: 3 | Status: SHIPPED | OUTPATIENT
Start: 2017-09-13 | End: 2017-11-17 | Stop reason: SDUPTHER

## 2017-09-13 RX ORDER — LISINOPRIL 10 MG/1
TABLET ORAL
Qty: 90 TABLET | Refills: 3 | Status: SHIPPED | OUTPATIENT
Start: 2017-09-13 | End: 2018-04-23

## 2017-09-13 RX ORDER — PRAVASTATIN SODIUM 20 MG
TABLET ORAL
Qty: 90 TABLET | Refills: 3 | Status: SHIPPED | OUTPATIENT
Start: 2017-09-13 | End: 2018-10-17 | Stop reason: SDUPTHER

## 2017-09-13 ASSESSMENT — ENCOUNTER SYMPTOMS
CONSTIPATION: 0
TROUBLE SWALLOWING: 0
SHORTNESS OF BREATH: 0
ABDOMINAL PAIN: 0
BACK PAIN: 0
COUGH: 0

## 2017-09-20 ENCOUNTER — TELEPHONE (OUTPATIENT)
Dept: ORTHOPEDIC SURGERY | Age: 62
End: 2017-09-20

## 2017-10-06 ENCOUNTER — OFFICE VISIT (OUTPATIENT)
Dept: ORTHOPEDIC SURGERY | Age: 62
End: 2017-10-06

## 2017-10-06 VITALS
WEIGHT: 182 LBS | DIASTOLIC BLOOD PRESSURE: 79 MMHG | HEIGHT: 61 IN | SYSTOLIC BLOOD PRESSURE: 120 MMHG | BODY MASS INDEX: 34.36 KG/M2 | HEART RATE: 83 BPM

## 2017-10-06 DIAGNOSIS — M17.11 PRIMARY OSTEOARTHRITIS OF RIGHT KNEE: Primary | ICD-10-CM

## 2017-10-06 PROCEDURE — 20610 DRAIN/INJ JOINT/BURSA W/O US: CPT | Performed by: ORTHOPAEDIC SURGERY

## 2017-10-06 NOTE — PROGRESS NOTES
Subjective:  Right knee pain. The Patient is here for her 1st Euflexxa injection for the right knee. Objective:   Blood pressure 120/79, pulse 83, height 5' 1\" (1.549 m), weight 182 lb (82.6 kg). There is a milld joint effusion noted of the right knee. There is moderate pain with range of motion testing. There is no significant  instability noted. Neuro exam grossly intact both lower extremities. Intact sensation to light touch. Motor exam 4+ to 5/5 in all major motor groups. Negative Maguire's sign. Skin is warm, dry and intact with out erythema or significant increased temperature around the right knee joint. Assessment:  Degenerative Joint Disease of the Right Knee. Plan:  Discussed Euflexxa injections including all  risks and benefits including increased pain, drug reaction, infection, bleeding, lack of improvement and  neurovascular injury. All the questions were answered. PROCEDURE NOTE:  PRE-PROCEDURE DIAGNOSIS: DJD knee  POST-PROCEDURE DIAGNOSIS: DJD knee    With the patient's permission, her right knee was prepped in standard sterile fashion with betadine and alcohol. The prefilled 2mL injection of the preferred Euflexxa was injected into the right lateral joint space compartment without difficulty. The patient tolerated the procedure well without difficulty. A band-aid was applied. POST-PROCEDURE INSTRUCTIONS GIVEN TO PATIENT: The patient was advised to ice the right knee for 15-20 minutes to relieve any injection site related pain. Decrease activity for the next 24 to 48 hours. May use prescription or OTC pain relievers as needed      FOLLOW-UP:   As directed or call or return to clinic if these symptoms worsen or fail to improve as anticipated. If at any time you are concerned you may contact the office for further instructions or care.

## 2017-10-06 NOTE — MR AVS SNAPSHOT
your BMI, the greater your risk of heart disease, high blood pressure, type 2 diabetes, stroke, gallstones, arthritis, sleep apnea, and certain cancers. BMI is not perfect. It may overestimate body fat in athletes and people who are more muscular. Even a small weight loss (between 5 and 10 percent of your current weight) by decreasing your calorie intake and becoming more physically active will help lower your risk of developing or worsening diseases associated with obesity. Learn more at: [employer]             Medications and Orders      Your Current Medications Are              traMADol (ULTRAM) 50 MG tablet TAKE ONE TABLET BY MOUTH EVERY 6 HOURS AS NEEDED FOR PAIN    lisinopril (PRINIVIL;ZESTRIL) 10 MG tablet TAKE 1 TABLET DAILY    pravastatin (PRAVACHOL) 20 MG tablet TAKE 1 TABLET DAILY    metFORMIN (GLUCOPHAGE-XR) 500 MG extended release tablet Take 1 tablet by mouth 3 times daily (with meals)    omeprazole (PRILOSEC) 40 MG delayed release capsule TAKE 1 CAPSULE TWICE A DAY    diclofenac 1.5 % SOLN APPLY TWO SQUIRTS TO THE AFFECTED AREA(S) TWO TIMES A DAY    triamcinolone (KENALOG) 0.1 % cream Apply topically 2 times daily. citalopram (CELEXA) 10 MG tablet Take 1 tablet by mouth daily    Cholecalciferol (VITAMIN D-3) 1000 UNITS CAPS Take 2,000 Units by mouth daily     oxybutynin (DITROPAN) 5 MG tablet Take 5 mg by mouth 2 times daily    Glucose Blood (KROGER TEST VI) 1 Device by In Vitro route. glucose blood VI test strips (KROGER TEST STRIPS) strip 1 each by In Vitro route daily. As needed. Ascorbic Acid (VITAMIN C) 250 MG tablet Take 500 mg by mouth nightly     Calcium Carbonate-Vitamin D (CALCIUM-VITAMIN D) 500-200 MG-UNIT per tablet Take 1 tablet by mouth nightly     therapeutic multivitamin-minerals (THERAGRAN-M) tablet Take 1 tablet by mouth daily.       Allergies              Advil Pm [Ibuprofen-diphenhydramine Cit] Shortness Of Breath All NSAID    Aspirin Shortness Of Breath    Zithromax [Azithromycin Dihydrate] Nausea And Vomiting, Other (See Comments)    Stomach cramping    Xanax [Alprazolam] Other (See Comments)    Slurred speech  Patient felt \"out of control\"         Additional Information        Basic Information     Date Of Birth Sex Race Ethnicity Preferred Language    1955 Female White Non-/Non  English      Problem List as of 10/6/2017  Date Reviewed: 10/6/2017                Bilateral carpal tunnel syndrome    History of uterine cancer    Midline low back pain without sciatica    Primary osteoarthritis of both knees    Type 2 diabetes mellitus without complication ([facility] [address]    Depression    Anemia    Malaise and fatigue    Symptomatic menopausal or female climacteric states    Sleep apnea    Esophageal reflux    Pure hypercholesterolemia    Other and unspecified hyperlipidemia    Hypertonicity of bladder      Your Goals as of 10/6/2017 at 9:49 AM                 Exercise    Exercise 3x per week (10 min per time). 11/19/2014       Immunizations as of 10/6/2017     Name Date    Influenza Vaccine, unspecified formulation 10/13/2016    Influenza Virus Vaccine 10/15/2013    Influenza Whole 10/13/2015    Pneumococcal Polysaccharide ([id number]) 2/17/2009    Td 10/1/2005    Tdap (Boostrix, Adacel) 3/12/2013      Preventive Care        Date Due    Hepatitis C screening is recommended for all adults regardless of risk factors born between [facility] at least once (lifetime) who have never been tested. 1955    HIV screening is recommended for all people regardless of risk factors  aged 15-65 years at least once (lifetime) who have never been HIV tested.  8/17/1972    Eye Exam By An Eye Doctor 12/16/2016    Zoster Vaccine 8/17/2017    Yearly Flu Vaccine (1) 9/1/2017    Urine Check For Kidney Problems 6/16/2018    Hemoglobin A1C (Test For Long-Term Glucose Control) 9/11/2018    Cholesterol Screening 9/11/2018

## 2017-10-13 ENCOUNTER — OFFICE VISIT (OUTPATIENT)
Dept: ORTHOPEDIC SURGERY | Age: 62
End: 2017-10-13

## 2017-10-13 VITALS
HEART RATE: 80 BPM | SYSTOLIC BLOOD PRESSURE: 125 MMHG | BODY MASS INDEX: 34.36 KG/M2 | DIASTOLIC BLOOD PRESSURE: 76 MMHG | WEIGHT: 182 LBS | HEIGHT: 61 IN

## 2017-10-13 DIAGNOSIS — M17.11 PRIMARY OSTEOARTHRITIS OF RIGHT KNEE: Primary | ICD-10-CM

## 2017-10-13 PROCEDURE — 20610 DRAIN/INJ JOINT/BURSA W/O US: CPT | Performed by: ORTHOPAEDIC SURGERY

## 2017-10-13 NOTE — LETTER
[facility] [address] [address]  Phone: [phone]  Fax: [phone]    Fatemeh Fraire MD        October 13, 2017       Patient: Aleene Krabbe   MR Number: [id number]   YOB: 1955   Date of Visit: 10/13/2017       Dear Dr. Espinoza Body: Thank you for the request for consultation for Juni Sotelo to me for the evaluation of right knee joint fluid injections for arthritis. Below are the relevant portions of my assessment and plan of care. If you have questions, please do not hesitate to call me. I look forward to following Karoline Perez along with you.     Sincerely,        Reyna Tiwari MD    CC providers:  Shayan Carmen MD  [address] [place] [address]

## 2017-10-18 ENCOUNTER — OFFICE VISIT (OUTPATIENT)
Dept: ORTHOPEDIC SURGERY | Age: 62
End: 2017-10-18

## 2017-10-18 VITALS — BODY MASS INDEX: 34.55 KG/M2 | WEIGHT: 183 LBS | HEIGHT: 61 IN

## 2017-10-18 DIAGNOSIS — M17.11 PRIMARY OSTEOARTHRITIS OF RIGHT KNEE: Primary | ICD-10-CM

## 2017-10-18 PROCEDURE — 20610 DRAIN/INJ JOINT/BURSA W/O US: CPT | Performed by: ORTHOPAEDIC SURGERY

## 2017-10-18 NOTE — LETTER
[address] [address]  Phone: [phone]  Fax: [phone]    Sandra Price MD        October 18, 2017       Patient: Ina Ricks   MR Number: [id number]   YOB: 1955   Date of Visit: 10/18/2017       Dear Dr. Lay Lopez: Thank you for the request for consultation for Fredrick Ayoub to me for the evaluation of right knee arthritis. Below are the relevant portions of my assessment and plan of care. If you have questions, please do not hesitate to call me. I look forward to following Vanessa Bolden along with you.     Sincerely,        Nida Dumont MD    CC providers:  Cyndi Greene MD  [address] [place] [address]

## 2017-10-18 NOTE — PROGRESS NOTES
Subjective:  Right knee pain. The Patient is here for her 3rd Euflexxa injection for the right knee. She is starting to feel some improvement     Objective:   Height 5' 1\" (1.549 m), weight 183 lb (83 kg). There is a milld joint effusion noted of the right knee. There is mild pain with range of motion testing. There is no significant  instability noted. Neuro exam grossly intact both lower extremities. Intact sensation to light touch. Motor exam 4+ to 5/5 in all major motor groups. Negative Maguire's sign. Skin is warm, dry and intact with out erythema or significant increased temperature around the right knee joint. Assessment:  Degenerative Joint Disease of the Right Knee. Plan:  Discussed Euflexxa injections including all  risks and benefits including increased pain, drug reaction, infection, bleeding, lack of improvement and  neurovascular injury. All the questions were answered. PROCEDURE NOTE:  PRE-PROCEDURE DIAGNOSIS: DJD knee  POST-PROCEDURE DIAGNOSIS: DJD knee    With the patient's permission, her right knee was prepped in standard sterile fashion with betadine and alcohol. The prefilled 2mL injection of the preferred Euflexxa was injected into the right lateral joint space compartment without difficulty. The patient tolerated the procedure well without difficulty. A band-aid was applied. POST-PROCEDURE INSTRUCTIONS GIVEN TO PATIENT: The patient was advised to ice the right knee for 15-20 minutes to relieve any injection site related pain. Decrease activity for the next 24 to 48 hours. May use prescription or OTC pain relievers as needed      FOLLOW-UP:   As directed or call or return to clinic if these symptoms worsen or fail to improve as anticipated. If at any time you are concerned you may contact the office for further instructions or care.

## 2017-11-07 RX ORDER — OMEPRAZOLE 40 MG/1
CAPSULE, DELAYED RELEASE ORAL
Qty: 180 CAPSULE | Refills: 0 | Status: SHIPPED | OUTPATIENT
Start: 2017-11-07 | End: 2018-02-20 | Stop reason: SDUPTHER

## 2017-11-17 RX ORDER — METFORMIN HYDROCHLORIDE 500 MG/1
500 TABLET, EXTENDED RELEASE ORAL
Qty: 270 TABLET | Refills: 3 | Status: SHIPPED | OUTPATIENT
Start: 2017-11-17 | End: 2018-06-15 | Stop reason: DRUGHIGH

## 2017-12-04 ENCOUNTER — HOSPITAL ENCOUNTER (OUTPATIENT)
Dept: MAMMOGRAPHY | Age: 62
Discharge: HOME | End: 2017-12-04
Attending: OBSTETRICS & GYNECOLOGY | Admitting: OBSTETRICS & GYNECOLOGY

## 2017-12-04 DIAGNOSIS — Z12.31 VISIT FOR SCREENING MAMMOGRAM: ICD-10-CM

## 2017-12-13 ENCOUNTER — OFFICE VISIT (OUTPATIENT)
Dept: FAMILY MEDICINE CLINIC | Age: 62
End: 2017-12-13

## 2017-12-13 VITALS
OXYGEN SATURATION: 98 % | BODY MASS INDEX: 34.39 KG/M2 | WEIGHT: 182 LBS | DIASTOLIC BLOOD PRESSURE: 70 MMHG | TEMPERATURE: 98.4 F | HEART RATE: 66 BPM | SYSTOLIC BLOOD PRESSURE: 134 MMHG

## 2017-12-13 DIAGNOSIS — K21.00 GASTROESOPHAGEAL REFLUX DISEASE WITH ESOPHAGITIS: ICD-10-CM

## 2017-12-13 DIAGNOSIS — M17.0 PRIMARY OSTEOARTHRITIS OF BOTH KNEES: ICD-10-CM

## 2017-12-13 DIAGNOSIS — F32.5 MAJOR DEPRESSIVE DISORDER WITH SINGLE EPISODE, IN FULL REMISSION (HCC): ICD-10-CM

## 2017-12-13 DIAGNOSIS — E78.00 PURE HYPERCHOLESTEROLEMIA: Primary | ICD-10-CM

## 2017-12-13 DIAGNOSIS — E11.9 TYPE 2 DIABETES MELLITUS WITHOUT COMPLICATION, WITHOUT LONG-TERM CURRENT USE OF INSULIN (HCC): ICD-10-CM

## 2017-12-13 PROCEDURE — 99214 OFFICE O/P EST MOD 30 MIN: CPT | Performed by: FAMILY MEDICINE

## 2017-12-13 NOTE — PROGRESS NOTES
Chief Complaint   Patient presents with    Diabetes    Hypertension    Hyperlipidemia    Depression    Back Pain    Gastroesophageal Reflux      Patient has complaints of :    Nasal congestion  Sores inside nose     Electronically signed by Belinda Rivero MA on 12/13/2017 at 8:23 AM       Patient is here for follow-up of the following problems:  Chief Complaint   Patient presents with    Diabetes    Hypertension    Hyperlipidemia    Depression    Back Pain    Gastroesophageal Reflux    also has OA right knee which is doing better, taking 2 tramadol BID   celexa is working for anxiety and depression   [place] report accessed and reviewed   Recent URI,           Treatment Adherence:   Medication compliance:  compliant all of the time  Diet compliance:  compliant most of the time  Weight trend: stable  Current exercise: job is physical  Barriers: knee pain which is better    Diabetes Mellitus Type 2: Current symptoms/problems include none. Home blood sugar records: fasting range: 100-110  Any episodes of hypoglycemia? no  Eye exam current (within one year): yes  Tobacco history: She  reports that she quit smoking about 22 years ago. Her smoking use included Cigarettes. She quit after 10.00 years of use. She has never used smokeless tobacco.   Daily Aspirin? No    Hypertension:  Home blood pressure monitoring: No.  She is adherent to a low sodium diet. Patient denies chest pain and shortness of breath. Antihypertensive medication side effects: no medication side effects noted. Use of agents associated with hypertension: none. Hyperlipidemia:  No new myalgias or GI upset on pravastatin (Pravachol).        Lab Results   Component Value Date    LABA1C 7.3 09/11/2017    LABA1C 7.4 06/16/2017    LABA1C 6.4 03/03/2017     Lab Results   Component Value Date    LABMICR <1.20 06/16/2017    CREATININE 1.0 09/11/2017     Lab Results   Component Value Date    ALT 23 09/11/2017    AST 20 09/11/2017     Lab Results   Component Value Date    CHOL 153 09/11/2017    TRIG 235 (H) 09/11/2017    HDL 49 09/11/2017    LDLCALC 57 09/11/2017        Current Outpatient Prescriptions on File Prior to Visit   Medication Sig Dispense Refill    metFORMIN (GLUCOPHAGE-XR) 500 MG extended release tablet Take 1 tablet by mouth 3 times daily (with meals) 270 tablet 3    omeprazole (PRILOSEC) 40 MG delayed release capsule TAKE 1 CAPSULE TWICE A  capsule 0    traMADol (ULTRAM) 50 MG tablet TAKE ONE TABLET BY MOUTH EVERY 6 HOURS AS NEEDED FOR PAIN 100 tablet 2    lisinopril (PRINIVIL;ZESTRIL) 10 MG tablet TAKE 1 TABLET DAILY 90 tablet 3    pravastatin (PRAVACHOL) 20 MG tablet TAKE 1 TABLET DAILY 90 tablet 3    diclofenac 1.5 % SOLN APPLY TWO SQUIRTS TO THE AFFECTED AREA(S) TWO TIMES A DAY 1 Bottle 2    triamcinolone (KENALOG) 0.1 % cream Apply topically 2 times daily. 45 g 1    citalopram (CELEXA) 10 MG tablet Take 1 tablet by mouth daily 90 tablet 3    Cholecalciferol (VITAMIN D-3) 1000 UNITS CAPS Take 2,000 Units by mouth daily       oxybutynin (DITROPAN) 5 MG tablet Take 5 mg by mouth 2 times daily      Glucose Blood (KROGER TEST VI) 1 Device by In Vitro route.  glucose blood VI test strips (KROGER TEST STRIPS) strip 1 each by In Vitro route daily. As needed.  Ascorbic Acid (VITAMIN C) 250 MG tablet Take 500 mg by mouth nightly       Calcium Carbonate-Vitamin D (CALCIUM-VITAMIN D) 500-200 MG-UNIT per tablet Take 1 tablet by mouth nightly       therapeutic multivitamin-minerals (THERAGRAN-M) tablet Take 1 tablet by mouth daily.        Current Facility-Administered Medications on File Prior to Visit   Medication Dose Route Frequency Provider Last Rate Last Dose    betamethasone acetate-betamethasone sodium phosphate (CELESTONE) injection 12 mg  12 mg Intra-articular Once Ade Aguirre MD               ROS   Sore in nose right side   mild cough and URI  mild right medical knee pain  ROS otherwise negative        Physical Exam   Constitutional: She is oriented to person, place, and time. She appears well-developed and well-nourished. obese   HENT:   Right Ear: Tympanic membrane normal.   Left Ear: Tympanic membrane normal.   Nose: Epistaxis (right septum) is observed. Eyes: Pupils are equal, round, and reactive to light. Fundoscopic exam:       The right eye shows no exudate and no hemorrhage. The left eye shows no exudate and no hemorrhage. Neck: Normal carotid pulses present. Carotid bruit is not present. No thyromegaly present. Cardiovascular: Normal rate, regular rhythm, normal heart sounds, intact distal pulses and normal pulses. No murmur heard. Pulmonary/Chest: Effort normal and breath sounds normal.   Abdominal: Bowel sounds are normal. There is no hepatosplenomegaly. Musculoskeletal: She exhibits no edema. Some osteophyte change in both knees right greater than left   Lymphadenopathy:     She has no cervical adenopathy. Neurological: She is alert and oriented to person, place, and time. No cranial nerve deficit or sensory deficit. Coordination normal.   10 gram fiber sensation normal on the toes and feet     Skin:   Pigmentation right cheek     Psychiatric: She does not exhibit a depressed mood. Luisito San was seen today for diabetes, hypertension, hyperlipidemia, depression, back pain and gastroesophageal reflux.     Diagnoses and all orders for this visit:    Pure hypercholesterolemia    Type 2 diabetes mellitus without complication, without long-term current use of insulin ([address]    Major depressive disorder with single episode, in full remission ([address]    Primary osteoarthritis of both knees    Gastroesophageal reflux disease with esophagitis     conditions stable, use PSO in nose   othr meds thkaris same, rtc  3 months  Discussed diet and weight loss is less than she was 6 months ago

## 2018-01-05 ENCOUNTER — HOSPITAL ENCOUNTER (OUTPATIENT)
Dept: OTHER | Age: 63
Discharge: HOME | End: 2018-01-05
Attending: FAMILY MEDICINE | Admitting: FAMILY MEDICINE

## 2018-01-05 DIAGNOSIS — E11.9 TYPE 2 DIABETES MELLITUS WITHOUT COMPLICATION, WITHOUT LONG-TERM CURRENT USE OF INSULIN (HCC): ICD-10-CM

## 2018-01-06 LAB
ESTIMATED AVERAGE GLUCOSE: 128.4 MG/DL
HBA1C MFR BLD: 6.1 %

## 2018-01-19 ENCOUNTER — OFFICE VISIT (OUTPATIENT)
Dept: FAMILY MEDICINE CLINIC | Age: 63
End: 2018-01-19

## 2018-01-19 VITALS
OXYGEN SATURATION: 95 % | WEIGHT: 180.2 LBS | TEMPERATURE: 98.3 F | SYSTOLIC BLOOD PRESSURE: 120 MMHG | DIASTOLIC BLOOD PRESSURE: 58 MMHG | BODY MASS INDEX: 34.05 KG/M2 | HEART RATE: 79 BPM

## 2018-01-19 DIAGNOSIS — J01.90 ACUTE BACTERIAL SINUSITIS: Primary | ICD-10-CM

## 2018-01-19 DIAGNOSIS — B96.89 ACUTE BACTERIAL SINUSITIS: Primary | ICD-10-CM

## 2018-01-19 PROCEDURE — 99213 OFFICE O/P EST LOW 20 MIN: CPT | Performed by: FAMILY MEDICINE

## 2018-01-19 RX ORDER — OSELTAMIVIR PHOSPHATE 75 MG/1
75 CAPSULE ORAL 2 TIMES DAILY
Qty: 10 CAPSULE | Refills: 0 | Status: CANCELLED | OUTPATIENT
Start: 2018-01-19 | End: 2018-01-24

## 2018-01-19 RX ORDER — AMOXICILLIN AND CLAVULANATE POTASSIUM 875; 125 MG/1; MG/1
1 TABLET, FILM COATED ORAL 2 TIMES DAILY
Qty: 20 TABLET | Refills: 0 | Status: SHIPPED | OUTPATIENT
Start: 2018-01-19 | End: 2018-01-29

## 2018-01-19 ASSESSMENT — ENCOUNTER SYMPTOMS
COUGH: 1
SWOLLEN GLANDS: 1

## 2018-01-19 NOTE — LETTER
[address] [facility]  Phone: [phone]  Fax: [phone]    Chiki Chu MD        January 19, 2018     Patient: Sina Palma   YOB: 1955   Date of Visit: 1/19/2018       To Whom it May Concern:    Juanpablo Morris was seen in my clinic on 1/19/2018. She may return to work on 1/22/18 if no fever x 24 hrs. If you have any questions or concerns, please don't hesitate to call.     Sincerely,         Chiki Chu MD

## 2018-01-19 NOTE — PROGRESS NOTES
amoxicillin-clavulanate (AUGMENTIN) 875-125 MG per tablet;  Take 1 tablet by mouth 2 times daily for 10 days

## 2018-02-08 DIAGNOSIS — G89.29 CHRONIC MIDLINE LOW BACK PAIN WITHOUT SCIATICA: Primary | ICD-10-CM

## 2018-02-08 DIAGNOSIS — M17.0 PRIMARY OSTEOARTHRITIS OF BOTH KNEES: ICD-10-CM

## 2018-02-08 DIAGNOSIS — M54.50 CHRONIC MIDLINE LOW BACK PAIN WITHOUT SCIATICA: Primary | ICD-10-CM

## 2018-02-08 RX ORDER — TRAMADOL HYDROCHLORIDE 50 MG/1
TABLET ORAL
Qty: 100 TABLET | Refills: 0 | Status: SHIPPED | OUTPATIENT
Start: 2018-02-08 | End: 2018-04-03 | Stop reason: SDUPTHER

## 2018-02-20 ENCOUNTER — TELEPHONE (OUTPATIENT)
Dept: FAMILY MEDICINE CLINIC | Age: 63
End: 2018-02-20

## 2018-02-21 RX ORDER — OMEPRAZOLE 40 MG/1
CAPSULE, DELAYED RELEASE ORAL
Qty: 180 CAPSULE | Refills: 0 | Status: SHIPPED | OUTPATIENT
Start: 2018-02-21 | End: 2018-05-14 | Stop reason: SDUPTHER

## 2018-03-08 ENCOUNTER — TELEPHONE (OUTPATIENT)
Dept: FAMILY MEDICINE CLINIC | Age: 63
End: 2018-03-08

## 2018-03-08 NOTE — TELEPHONE ENCOUNTER
citalopram (CELEXA) 10 MG tablet 90 tablet 3 3/6/2017     Sig - Route:  Take 1 tablet by mouth daily - Oral      Jet mora

## 2018-03-09 ENCOUNTER — OFFICE VISIT (OUTPATIENT)
Dept: ORTHOPEDIC SURGERY | Age: 63
End: 2018-03-09

## 2018-03-09 VITALS
WEIGHT: 183 LBS | DIASTOLIC BLOOD PRESSURE: 75 MMHG | SYSTOLIC BLOOD PRESSURE: 110 MMHG | BODY MASS INDEX: 34.55 KG/M2 | HEART RATE: 78 BPM | RESPIRATION RATE: 17 BRPM | HEIGHT: 61 IN

## 2018-03-09 DIAGNOSIS — M25.562 LEFT KNEE PAIN, UNSPECIFIED CHRONICITY: Primary | ICD-10-CM

## 2018-03-09 PROCEDURE — 99243 OFF/OP CNSLTJ NEW/EST LOW 30: CPT | Performed by: ORTHOPAEDIC SURGERY

## 2018-03-09 RX ORDER — CITALOPRAM 10 MG/1
10 TABLET ORAL DAILY
Qty: 90 TABLET | Refills: 3 | Status: SHIPPED | OUTPATIENT
Start: 2018-03-09 | End: 2019-03-06 | Stop reason: SDUPTHER

## 2018-03-09 NOTE — PROGRESS NOTES
Strength is 4+ to 5/5 for all muscle groups about the left knee. There is patellofemoral crepitus with range of motion of the left knee. Varus and valgus stressing of the knees reveals no evidence of instability. There is a trace effusion in the left knee. Anterior drawer and Lachman are negative bilaterally. Examination of the skin reveals no rashes, ulceration, or lesion, bilaterally in the lower extremities. Sensation to both lower extremities is grossly intact. Exam of both feet reveals pedal pulses intact and brisk cap refill. Patient is able to dorsiflex and wiggle all toes. Deep tendon reflexes of the lower extremities are normal and symmetric. Imagin views of the left knee obtained in the ER 3/7/18 were reviewed as well as an additional 2 views obtained in the office. There is no evidence of fracture. There is evidence of moderate osteoarthritis of the left knee. Changes are most severe within the medial compartment. Impression: #1 left knee sprain #2 left knee osteoarthritis #3 left hamstring strain    Plan: At this time, we will treat the patient conservatively. A Medrol Dosepak was sent to the patient's pharmacy. She will continue with her Tylenol and tramadol as needed. She was given a return to work on exercises and stretching exercises. We will keep the patient in work for an additional week after which she may return on 3/19/18. She'll follow-up with us in 2-3 weeks and we will reassess her then. Unfortunately, the patient reports an intolerance to all NSAIDs.

## 2018-03-12 RX ORDER — METHYLPREDNISOLONE 4 MG/1
TABLET ORAL
Qty: 1 KIT | Refills: 0 | Status: SHIPPED | OUTPATIENT
Start: 2018-03-12 | End: 2018-03-15

## 2018-03-14 ENCOUNTER — OFFICE VISIT (OUTPATIENT)
Dept: FAMILY MEDICINE CLINIC | Age: 63
End: 2018-03-14

## 2018-03-14 VITALS
HEART RATE: 78 BPM | TEMPERATURE: 98.5 F | SYSTOLIC BLOOD PRESSURE: 138 MMHG | DIASTOLIC BLOOD PRESSURE: 72 MMHG | BODY MASS INDEX: 33.82 KG/M2 | OXYGEN SATURATION: 98 % | WEIGHT: 179 LBS

## 2018-03-14 DIAGNOSIS — G89.29 CHRONIC MIDLINE LOW BACK PAIN WITHOUT SCIATICA: Primary | ICD-10-CM

## 2018-03-14 DIAGNOSIS — M17.0 PRIMARY OSTEOARTHRITIS OF BOTH KNEES: ICD-10-CM

## 2018-03-14 DIAGNOSIS — M54.50 CHRONIC MIDLINE LOW BACK PAIN WITHOUT SCIATICA: Primary | ICD-10-CM

## 2018-03-14 DIAGNOSIS — E78.00 PURE HYPERCHOLESTEROLEMIA: ICD-10-CM

## 2018-03-14 DIAGNOSIS — E11.9 TYPE 2 DIABETES MELLITUS WITHOUT COMPLICATION, WITHOUT LONG-TERM CURRENT USE OF INSULIN (HCC): ICD-10-CM

## 2018-03-14 PROCEDURE — 99214 OFFICE O/P EST MOD 30 MIN: CPT | Performed by: FAMILY MEDICINE

## 2018-03-14 ASSESSMENT — ENCOUNTER SYMPTOMS
CONSTIPATION: 0
SHORTNESS OF BREATH: 0
DOUBLE VISION: 0
HEARTBURN: 0
BACK PAIN: 1
DIARRHEA: 0
BLURRED VISION: 0

## 2018-03-14 ASSESSMENT — PATIENT HEALTH QUESTIONNAIRE - PHQ9
SUM OF ALL RESPONSES TO PHQ9 QUESTIONS 1 & 2: 0
1. LITTLE INTEREST OR PLEASURE IN DOING THINGS: 0
2. FEELING DOWN, DEPRESSED OR HOPELESS: 0
SUM OF ALL RESPONSES TO PHQ QUESTIONS 1-9: 0

## 2018-03-14 NOTE — PROGRESS NOTES
Medication Dose Route Frequency Provider Last Rate Last Dose    betamethasone acetate-betamethasone sodium phosphate (CELESTONE) injection 12 mg  12 mg Intra-articular Once Debby Prince MD            Review of Systems   Constitutional: Negative for malaise/fatigue and weight loss. HENT: Negative for congestion and hearing loss. Eyes: Negative for blurred vision and double vision. Respiratory: Negative for shortness of breath. Cardiovascular: Negative for chest pain and leg swelling. Gastrointestinal: Negative for constipation, diarrhea and heartburn. Musculoskeletal: Positive for back pain. Negative for myalgias. Leg pain   Neurological: Negative for sensory change and focal weakness. Endo/Heme/Allergies: Negative for polydipsia. Psychiatric/Behavioral: Negative for depression. Social History     Social History    Marital status: Single     Spouse name: N/A    Number of children: N/A    Years of education: N/A     Occupational History    Not on file. Social History Main Topics    Smoking status: Former Smoker     Years: 10.00     Types: Cigarettes     Quit date: 4/11/1995    Smokeless tobacco: Never Used    Alcohol use No    Drug use: No    Sexual activity: No     Other Topics Concern    Not on file     Social History Narrative    No narrative on file        Physical Exam   Constitutional: She is oriented to person, place, and time. She appears well-developed and well-nourished. obese   HENT:   Right Ear: Tympanic membrane normal.   Left Ear: Tympanic membrane normal.   Nose: No epistaxis. Eyes: Pupils are equal, round, and reactive to light. Fundoscopic exam:       The right eye shows no exudate and no hemorrhage. The left eye shows no exudate and no hemorrhage. Neck: Normal carotid pulses present. Carotid bruit is not present. No thyromegaly present.    Cardiovascular: Normal rate, regular rhythm, normal heart sounds, intact distal pulses and normal pulses. No murmur heard. Pulmonary/Chest: Effort normal and breath sounds normal.   Abdominal: Bowel sounds are normal. There is no hepatosplenomegaly. Musculoskeletal: She exhibits no edema. Some osteophyte change in both knees right greater than left   Lymphadenopathy:     She has no cervical adenopathy. Neurological: She is alert and oriented to person, place, and time. No cranial nerve deficit or sensory deficit. Coordination normal.   10 gram fiber sensation normal on the toes and feet     Skin:   Pigmentation right cheek     Psychiatric: She does not exhibit a depressed mood. Pam Blanchard was seen today for diabetes, hypertension, hyperlipidemia, depression, back pain and gastroesophageal reflux. Diagnoses and all orders for this visit:    Chronic midline low back pain without sciatica    Pure hypercholesterolemia  -     Lipid Panel; Future  -     Comprehensive Metabolic Panel; Future    Primary osteoarthritis of both knees    Type 2 diabetes mellitus without complication, without long-term current use of insulin ([facility])  -      DIABETES FOOT EXAM  -     Hemoglobin A1C; Future  -     Microalbumin / Creatinine Urine Ratio; Future  -     Lipid Panel; Future  -     Comprehensive Metabolic Panel; Future    Other orders  -     diclofenac 1.5 % SOLN; APPLY TWO SQUIRTS TO THE AFFECTED AREA(S) TWO TIMES A DAY    Plan is to refill the diclofenac which she uses on her joints to assist with pain. Continue with the Ultram for her back. She is to repeat her blood test prior to her visit in 3 months. Recommended continuing to monitor blood sugar. count carbs , job is physical but she is unable to do her job at this time. She is to repeat her blood test prior to her visit in 3 months. Her chronic conditions appear to be controlled at this point.  No change in medication

## 2018-03-19 ENCOUNTER — TELEPHONE (OUTPATIENT)
Dept: ORTHOPEDIC SURGERY | Age: 63
End: 2018-03-19

## 2018-03-19 NOTE — TELEPHONE ENCOUNTER
Patient states that she is still experience a lot of pain in the left knee and she states that she still cannot out in pressure on the leg. She states that she has not RTW yet due to this. Shew wanted to let Dr Jelani Sullivan know- and also see if he recommends .  Should she just take the rest of the week of work to let the knee get better- she says that she does have an appt to see Dr Jelani Sullivan on Friday 3/23/18    Pls call patient to advise, [phone]

## 2018-03-23 ENCOUNTER — OFFICE VISIT (OUTPATIENT)
Dept: ORTHOPEDIC SURGERY | Age: 63
End: 2018-03-23

## 2018-03-23 VITALS
BODY MASS INDEX: 36.09 KG/M2 | HEIGHT: 59 IN | DIASTOLIC BLOOD PRESSURE: 68 MMHG | WEIGHT: 179.01 LBS | HEART RATE: 86 BPM | SYSTOLIC BLOOD PRESSURE: 107 MMHG | RESPIRATION RATE: 17 BRPM

## 2018-03-23 DIAGNOSIS — M25.562 ACUTE PAIN OF LEFT KNEE: Primary | ICD-10-CM

## 2018-03-23 DIAGNOSIS — M17.12 PRIMARY OSTEOARTHRITIS OF LEFT KNEE: ICD-10-CM

## 2018-03-23 PROCEDURE — 99213 OFFICE O/P EST LOW 20 MIN: CPT | Performed by: ORTHOPAEDIC SURGERY

## 2018-03-23 NOTE — LETTER
[facility]  [address]  Phone: [phone]  Fax: [phone]    Angel Betancourt MD        March 23, 2018     Patient: Napoleon Kincaid   YOB: 1955   Date of Visit: 3/23/2018       To Whom It May Concern: It is my medical opinion that Moiz Baires may return to work 3/26/18 light duty for 2weeks. After 2 weeks she may return full duty no restrictions. If you have any questions or concerns, please don't hesitate to call.     Sincerely,        Angel Betancourt MD

## 2018-03-28 ENCOUNTER — TELEPHONE (OUTPATIENT)
Dept: ORTHOPEDIC SURGERY | Age: 63
End: 2018-03-28

## 2018-04-03 DIAGNOSIS — M17.0 PRIMARY OSTEOARTHRITIS OF BOTH KNEES: ICD-10-CM

## 2018-04-03 DIAGNOSIS — M54.50 CHRONIC MIDLINE LOW BACK PAIN WITHOUT SCIATICA: ICD-10-CM

## 2018-04-03 DIAGNOSIS — G89.29 CHRONIC MIDLINE LOW BACK PAIN WITHOUT SCIATICA: ICD-10-CM

## 2018-04-03 RX ORDER — TRAMADOL HYDROCHLORIDE 50 MG/1
TABLET ORAL
Qty: 100 TABLET | Refills: 0 | Status: SHIPPED | OUTPATIENT
Start: 2018-04-03 | End: 2018-05-14 | Stop reason: SDUPTHER

## 2018-04-06 ENCOUNTER — TELEPHONE (OUTPATIENT)
Dept: ORTHOPEDIC SURGERY | Age: 63
End: 2018-04-06

## 2018-04-13 ENCOUNTER — HOSPITAL ENCOUNTER (OUTPATIENT)
Dept: PHYSICAL THERAPY | Age: 63
Discharge: HOME | End: 2018-04-30
Admitting: ORTHOPAEDIC SURGERY

## 2018-04-23 ENCOUNTER — OFFICE VISIT (OUTPATIENT)
Dept: ORTHOPEDIC SURGERY | Age: 63
End: 2018-04-23

## 2018-04-23 ENCOUNTER — PRE-EVALUATION (OUTPATIENT)
Dept: ORTHOPEDIC SURGERY | Age: 63
End: 2018-04-23

## 2018-04-23 ENCOUNTER — OFFICE VISIT (OUTPATIENT)
Dept: FAMILY MEDICINE CLINIC | Age: 63
End: 2018-04-23

## 2018-04-23 VITALS
HEART RATE: 81 BPM | WEIGHT: 179 LBS | BODY MASS INDEX: 36.15 KG/M2 | DIASTOLIC BLOOD PRESSURE: 72 MMHG | SYSTOLIC BLOOD PRESSURE: 136 MMHG | OXYGEN SATURATION: 98 %

## 2018-04-23 VITALS
DIASTOLIC BLOOD PRESSURE: 78 MMHG | SYSTOLIC BLOOD PRESSURE: 135 MMHG | BODY MASS INDEX: 36.08 KG/M2 | HEART RATE: 93 BPM | HEIGHT: 59 IN | WEIGHT: 179 LBS

## 2018-04-23 DIAGNOSIS — S83.90XA SPRAIN OF KNEE AND LEG, UNSPECIFIED LATERALITY, INITIAL ENCOUNTER: Primary | ICD-10-CM

## 2018-04-23 DIAGNOSIS — T78.3XXD ANGIOEDEMA, SUBSEQUENT ENCOUNTER: Primary | ICD-10-CM

## 2018-04-23 DIAGNOSIS — M17.10 PRIMARY OSTEOARTHRITIS OF KNEE, UNSPECIFIED LATERALITY: ICD-10-CM

## 2018-04-23 PROCEDURE — APPSS15 APP SPLIT SHARED TIME 0-15 MINUTES: Performed by: NURSE PRACTITIONER

## 2018-04-23 PROCEDURE — 99213 OFFICE O/P EST LOW 20 MIN: CPT | Performed by: ORTHOPAEDIC SURGERY

## 2018-04-23 PROCEDURE — 99213 OFFICE O/P EST LOW 20 MIN: CPT | Performed by: FAMILY MEDICINE

## 2018-04-23 RX ORDER — LOSARTAN POTASSIUM 50 MG/1
50 TABLET ORAL DAILY
Qty: 30 TABLET | Refills: 3 | Status: SHIPPED | OUTPATIENT
Start: 2018-04-23 | End: 2018-06-15 | Stop reason: SDUPTHER

## 2018-05-01 ENCOUNTER — HOSPITAL ENCOUNTER (OUTPATIENT)
Dept: OTHER | Age: 63
Discharge: HOME | End: 2018-05-31
Attending: ORTHOPAEDIC SURGERY | Admitting: ORTHOPAEDIC SURGERY

## 2018-05-14 DIAGNOSIS — G89.29 CHRONIC MIDLINE LOW BACK PAIN WITHOUT SCIATICA: ICD-10-CM

## 2018-05-14 DIAGNOSIS — M17.0 PRIMARY OSTEOARTHRITIS OF BOTH KNEES: ICD-10-CM

## 2018-05-14 DIAGNOSIS — M54.50 CHRONIC MIDLINE LOW BACK PAIN WITHOUT SCIATICA: ICD-10-CM

## 2018-05-14 RX ORDER — OMEPRAZOLE 40 MG/1
CAPSULE, DELAYED RELEASE ORAL
Qty: 180 CAPSULE | Refills: 0 | Status: SHIPPED | OUTPATIENT
Start: 2018-05-14 | End: 2018-08-14 | Stop reason: SDUPTHER

## 2018-05-14 RX ORDER — TRAMADOL HYDROCHLORIDE 50 MG/1
TABLET ORAL
Qty: 100 TABLET | Refills: 0 | Status: SHIPPED | OUTPATIENT
Start: 2018-05-14 | End: 2018-07-09 | Stop reason: SDUPTHER

## 2018-05-17 ENCOUNTER — HOSPITAL ENCOUNTER (OUTPATIENT)
Dept: PHYSICAL THERAPY | Age: 63
Discharge: HOME | End: 2018-05-18
Admitting: ORTHOPAEDIC SURGERY

## 2018-05-30 ENCOUNTER — HOSPITAL ENCOUNTER (OUTPATIENT)
Dept: PHYSICAL THERAPY | Age: 63
Discharge: HOME | End: 2018-06-30
Admitting: ORTHOPAEDIC SURGERY

## 2018-05-30 NOTE — FLOWSHEET NOTE
Physical Therapy Daily Treatment Note  Date:  2018    Patient Name:  Alexander Diaz    :  1955  MRN: [id number]  Restrictions/Precautions:  Fall Risk  Medical/Treatment Diagnosis Information:   · Diagnosis: L Knee Pain  · Treatment Diagnosis: L knee, hip, and lower LE pain; abnormal gait, impaired balance, decreased L knee ROM, decreased LE strength    Tracking Information:  Physician Information Referring Practitioner: Jerilyn Lombardi MD   Plan of Care Sent Date:  Signed Received: Y   Visit Count / Total Visits      Insurance Approved Visits    Approved Dates:     Insurance Information PT Insurance Information: Worker's [employer]- Keyla Florence, approved 2-3x/week x 4-6 weeks on 3/23/18 (spoke with Ezra Cote who states no end date; just can't exceed 18 visits)   Progress Note/G-codes   []  Yes  [x]  No Next Due: 13th visit     Pain level: 0-1/10 at rest, 3/10 with walking    Subjective:  Pt reports she had to work on Saturday. She did some pulling at work and irritated the knee a little bit.      Objective:  Observation:   : Reproduction of symptoms with palpation of pes anserine  : wearing Du Brace on L knee,     Wearing brace to L knee  : Continues to present with antalgic gait pattern; presents with neoprene brace this date with patellar cut out; increased pain with terminal knee ext in WB   : Continues to present with antalgic gait pattern     Test measurements:    : SLS, R=10+ sec, L=4 sec      Exercises:  Exercise/Equipment Resistance/Repetitions Other comments   Nu Step/ L5 x 4 min (S9)    Mat Table        Seated hamstring stretching 2 x 30 sec biasing semimembranosus  Seated adductor stretching 2 x 30 sec    //Bar    Tandem 2x30\" B/L  Airex:  Semi tandem balance 2 x 30 sec  Narrowed DORENE  Normal DORENE eyes closed        Mini squats 2 x 5         6\" step ups (LLE leading) 1x10    IB 2 x 30\" >> 2 x 10 HR    Stairs     Cables     Gait pending MD visit [] Discharge    Plan for Next Session:  LE strength, balance, safe stair ascent/descent    Electronically signed by:  Emma Mendez, PT, DPT

## 2018-06-01 ENCOUNTER — HOSPITAL ENCOUNTER (OUTPATIENT)
Dept: OTHER | Age: 63
Discharge: HOME | End: 2018-06-01
Attending: ORTHOPAEDIC SURGERY | Admitting: ORTHOPAEDIC SURGERY

## 2018-06-12 ENCOUNTER — HOSPITAL ENCOUNTER (OUTPATIENT)
Dept: OTHER | Age: 63
Discharge: HOME | End: 2018-06-12
Attending: FAMILY MEDICINE | Admitting: FAMILY MEDICINE

## 2018-06-12 DIAGNOSIS — E11.9 TYPE 2 DIABETES MELLITUS WITHOUT COMPLICATION, WITHOUT LONG-TERM CURRENT USE OF INSULIN (HCC): ICD-10-CM

## 2018-06-12 DIAGNOSIS — E78.00 PURE HYPERCHOLESTEROLEMIA: ICD-10-CM

## 2018-06-12 LAB
A/G RATIO: 1.4 (ref 1.1–2.2)
ALBUMIN SERPL-MCNC: 4.1 G/DL (ref 3.4–5)
ALP BLD-CCNC: 85 U/L (ref 40–129)
ALT SERPL-CCNC: 15 U/L (ref 10–40)
ANION GAP SERPL CALCULATED.3IONS-SCNC: 11 MMOL/L (ref 3–16)
AST SERPL-CCNC: 14 U/L (ref 15–37)
BILIRUB SERPL-MCNC: <0.2 MG/DL (ref 0–1)
BUN BLDV-MCNC: 17 MG/DL (ref 7–20)
CALCIUM SERPL-MCNC: 10.2 MG/DL (ref 8.3–10.6)
CHLORIDE BLD-SCNC: 101 MMOL/L (ref 99–110)
CHOLESTEROL, TOTAL: 139 MG/DL (ref 0–199)
CO2: 30 MMOL/L (ref 21–32)
CREAT SERPL-MCNC: 0.8 MG/DL (ref 0.6–1.2)
CREATININE URINE: 53.9 MG/DL (ref 28–259)
ESTIMATED AVERAGE GLUCOSE: 122.6 MG/DL
GFR AFRICAN AMERICAN: >60
GFR NON-AFRICAN AMERICAN: >60
GLOBULIN: 3 G/DL
GLUCOSE BLD-MCNC: 118 MG/DL (ref 70–99)
HBA1C MFR BLD: 5.9 %
HDLC SERPL-MCNC: 46 MG/DL (ref 40–60)
LDL CHOLESTEROL CALCULATED: 56 MG/DL
MICROALBUMIN UR-MCNC: <1.2 MG/DL
MICROALBUMIN/CREAT UR-RTO: NORMAL MG/G (ref 0–30)
POTASSIUM SERPL-SCNC: 4.9 MMOL/L (ref 3.5–5.1)
SODIUM BLD-SCNC: 142 MMOL/L (ref 136–145)
TOTAL PROTEIN: 7.1 G/DL (ref 6.4–8.2)
TRIGL SERPL-MCNC: 187 MG/DL (ref 0–150)
VLDLC SERPL CALC-MCNC: 37 MG/DL

## 2018-06-15 ENCOUNTER — OFFICE VISIT (OUTPATIENT)
Dept: FAMILY MEDICINE CLINIC | Age: 63
End: 2018-06-15

## 2018-06-15 VITALS
OXYGEN SATURATION: 96 % | SYSTOLIC BLOOD PRESSURE: 136 MMHG | BODY MASS INDEX: 36.27 KG/M2 | DIASTOLIC BLOOD PRESSURE: 70 MMHG | HEART RATE: 82 BPM | WEIGHT: 179.6 LBS

## 2018-06-15 DIAGNOSIS — E11.9 TYPE 2 DIABETES MELLITUS WITHOUT COMPLICATION, WITHOUT LONG-TERM CURRENT USE OF INSULIN (HCC): Primary | ICD-10-CM

## 2018-06-15 DIAGNOSIS — K21.00 GASTROESOPHAGEAL REFLUX DISEASE WITH ESOPHAGITIS: ICD-10-CM

## 2018-06-15 DIAGNOSIS — L20.84 INTRINSIC ECZEMA: ICD-10-CM

## 2018-06-15 DIAGNOSIS — F32.5 MAJOR DEPRESSIVE DISORDER WITH SINGLE EPISODE, IN FULL REMISSION (HCC): ICD-10-CM

## 2018-06-15 DIAGNOSIS — J30.1 CHRONIC SEASONAL ALLERGIC RHINITIS DUE TO POLLEN: ICD-10-CM

## 2018-06-15 DIAGNOSIS — E78.00 PURE HYPERCHOLESTEROLEMIA: ICD-10-CM

## 2018-06-15 DIAGNOSIS — M17.0 PRIMARY OSTEOARTHRITIS OF BOTH KNEES: ICD-10-CM

## 2018-06-15 PROCEDURE — 99214 OFFICE O/P EST MOD 30 MIN: CPT | Performed by: FAMILY MEDICINE

## 2018-06-15 RX ORDER — METFORMIN HYDROCHLORIDE 500 MG/1
500 TABLET, EXTENDED RELEASE ORAL 2 TIMES DAILY
Qty: 270 TABLET | Refills: 3 | Status: SHIPPED | OUTPATIENT
Start: 2018-06-15 | End: 2019-01-30

## 2018-06-15 RX ORDER — ASCORBIC ACID 500 MG
500 TABLET ORAL DAILY
COMMUNITY
End: 2021-12-15

## 2018-06-15 RX ORDER — LOSARTAN POTASSIUM 50 MG/1
50 TABLET ORAL DAILY
Qty: 90 TABLET | Refills: 3 | Status: SHIPPED | OUTPATIENT
Start: 2018-06-15 | End: 2019-06-24 | Stop reason: SDUPTHER

## 2018-06-15 RX ORDER — ACETAMINOPHEN 160 MG
TABLET,DISINTEGRATING ORAL
COMMUNITY

## 2018-06-15 ASSESSMENT — ENCOUNTER SYMPTOMS
ABDOMINAL PAIN: 0
COUGH: 0
SHORTNESS OF BREATH: 0
BACK PAIN: 1
TROUBLE SWALLOWING: 0
CONSTIPATION: 0

## 2018-07-01 ENCOUNTER — HOSPITAL ENCOUNTER (OUTPATIENT)
Dept: OTHER | Age: 63
Discharge: HOME | End: 2018-07-31
Attending: ORTHOPAEDIC SURGERY | Admitting: ORTHOPAEDIC SURGERY

## 2018-07-09 DIAGNOSIS — M54.50 CHRONIC MIDLINE LOW BACK PAIN WITHOUT SCIATICA: ICD-10-CM

## 2018-07-09 DIAGNOSIS — M17.0 PRIMARY OSTEOARTHRITIS OF BOTH KNEES: ICD-10-CM

## 2018-07-09 DIAGNOSIS — G89.29 CHRONIC MIDLINE LOW BACK PAIN WITHOUT SCIATICA: ICD-10-CM

## 2018-07-09 RX ORDER — TRAMADOL HYDROCHLORIDE 50 MG/1
TABLET ORAL
Qty: 100 TABLET | Refills: 0 | Status: SHIPPED | OUTPATIENT
Start: 2018-07-09 | End: 2018-08-22 | Stop reason: SDUPTHER

## 2018-07-09 NOTE — TELEPHONE ENCOUNTER
Medication:   Requested Prescriptions     Pending Prescriptions Disp Refills    traMADol (ULTRAM) 50 MG tablet [Pharmacy Med Name: traMADol HCL 50MG TABLET] 100 tablet 0     Sig: TAKE ONE TABLET BY MOUTH EVERY 6 HOURS AS NEEDED FOR PAIN      Last Filled:  5/14/2018    Patient Phone Number: [phone] (home) [phone] (work)    Last appt: 6/15/2018   Next appt: 9/12/2018    Last OARRS:   RX Monitoring 6/15/2018   Attestation The Prescription Monitoring Report for this patient was reviewed today. Documentation Possible medication side effects, risk of tolerance/dependence & alternative treatments discussed. ;No signs of potential drug abuse or diversion identified.    Acute Pain Prescriptions -       Preferred Pharmacy:   [address], [address] [place] [address]  Nay Levin [phone]  Phone: [phone] Fax: [phone]

## 2018-08-14 RX ORDER — OMEPRAZOLE 40 MG/1
CAPSULE, DELAYED RELEASE ORAL
Qty: 180 CAPSULE | Refills: 0 | Status: SHIPPED | OUTPATIENT
Start: 2018-08-14 | End: 2018-10-17 | Stop reason: SDUPTHER

## 2018-08-22 DIAGNOSIS — M54.50 CHRONIC MIDLINE LOW BACK PAIN WITHOUT SCIATICA: Primary | ICD-10-CM

## 2018-08-22 DIAGNOSIS — M17.0 PRIMARY OSTEOARTHRITIS OF BOTH KNEES: ICD-10-CM

## 2018-08-22 DIAGNOSIS — G89.29 CHRONIC MIDLINE LOW BACK PAIN WITHOUT SCIATICA: Primary | ICD-10-CM

## 2018-08-22 RX ORDER — TRAMADOL HYDROCHLORIDE 50 MG/1
TABLET ORAL
Qty: 100 TABLET | Refills: 0 | Status: SHIPPED | OUTPATIENT
Start: 2018-08-22 | End: 2018-10-17 | Stop reason: SDUPTHER

## 2018-08-22 NOTE — TELEPHONE ENCOUNTER
Medication:   Requested Prescriptions     Pending Prescriptions Disp Refills    traMADol (ULTRAM) 50 MG tablet [Pharmacy Med Name: traMADol HCL 50MG TABLET] 100 tablet 0     Sig: TAKE ONE TABLET BY MOUTH EVERY 6 HOURS AS NEEDED FOR PAIN      Last Filled:  7/9/2018    Patient Phone Number: [phone] (home) [phone] (work)    Last appt: 6/15/2018   Next appt: 9/12/2018    Last OARRS:   RX Monitoring 6/15/2018   Attestation The Prescription Monitoring Report for this patient was reviewed today. Documentation Possible medication side effects, risk of tolerance/dependence & alternative treatments discussed. ;No signs of potential drug abuse or diversion identified.    Acute Pain Prescriptions -       Preferred Pharmacy:   [address], [address] [place] [address]  [address] [phone]  Phone: [phone] Fax: [phone]

## 2018-10-17 ENCOUNTER — OFFICE VISIT (OUTPATIENT)
Dept: FAMILY MEDICINE CLINIC | Age: 63
End: 2018-10-17
Payer: COMMERCIAL

## 2018-10-17 VITALS
BODY MASS INDEX: 36.36 KG/M2 | OXYGEN SATURATION: 97 % | DIASTOLIC BLOOD PRESSURE: 64 MMHG | WEIGHT: 180 LBS | SYSTOLIC BLOOD PRESSURE: 120 MMHG | HEART RATE: 75 BPM

## 2018-10-17 DIAGNOSIS — G47.33 OBSTRUCTIVE SLEEP APNEA SYNDROME: ICD-10-CM

## 2018-10-17 DIAGNOSIS — G89.29 CHRONIC MIDLINE LOW BACK PAIN WITHOUT SCIATICA: ICD-10-CM

## 2018-10-17 DIAGNOSIS — K21.00 GASTROESOPHAGEAL REFLUX DISEASE WITH ESOPHAGITIS: ICD-10-CM

## 2018-10-17 DIAGNOSIS — E11.9 TYPE 2 DIABETES MELLITUS WITHOUT COMPLICATION, WITHOUT LONG-TERM CURRENT USE OF INSULIN (HCC): Primary | ICD-10-CM

## 2018-10-17 DIAGNOSIS — M17.0 PRIMARY OSTEOARTHRITIS OF BOTH KNEES: ICD-10-CM

## 2018-10-17 DIAGNOSIS — E78.00 PURE HYPERCHOLESTEROLEMIA: ICD-10-CM

## 2018-10-17 DIAGNOSIS — M54.50 CHRONIC MIDLINE LOW BACK PAIN WITHOUT SCIATICA: ICD-10-CM

## 2018-10-17 PROCEDURE — 99214 OFFICE O/P EST MOD 30 MIN: CPT | Performed by: FAMILY MEDICINE

## 2018-10-17 RX ORDER — PRAVASTATIN SODIUM 20 MG
TABLET ORAL
Qty: 90 TABLET | Refills: 3 | Status: SHIPPED | OUTPATIENT
Start: 2018-10-17 | End: 2019-05-29 | Stop reason: SDUPTHER

## 2018-10-17 RX ORDER — TRAMADOL HYDROCHLORIDE 50 MG/1
50 TABLET ORAL EVERY 6 HOURS PRN
Qty: 100 TABLET | Refills: 0 | Status: SHIPPED | OUTPATIENT
Start: 2018-10-17 | End: 2018-12-03 | Stop reason: SDUPTHER

## 2018-10-17 RX ORDER — OXYBUTYNIN CHLORIDE 5 MG/1
5 TABLET ORAL 2 TIMES DAILY
Qty: 180 TABLET | Refills: 3 | Status: SHIPPED | OUTPATIENT
Start: 2018-10-17 | End: 2019-11-13 | Stop reason: SDUPTHER

## 2018-10-17 RX ORDER — OMEPRAZOLE 40 MG/1
CAPSULE, DELAYED RELEASE ORAL
Qty: 90 CAPSULE | Refills: 3 | Status: SHIPPED | OUTPATIENT
Start: 2018-10-17 | End: 2019-09-30 | Stop reason: SDUPTHER

## 2018-10-17 ASSESSMENT — ENCOUNTER SYMPTOMS
CONSTIPATION: 0
COUGH: 0
TROUBLE SWALLOWING: 0
SHORTNESS OF BREATH: 0
BACK PAIN: 1
ABDOMINAL PAIN: 0

## 2018-10-25 ENCOUNTER — HOSPITAL ENCOUNTER (OUTPATIENT)
Dept: WOMENS IMAGING | Age: 63
Discharge: HOME | End: 2018-10-25
Payer: COMMERCIAL

## 2018-10-25 ENCOUNTER — HOSPITAL ENCOUNTER (OUTPATIENT)
Dept: ULTRASOUND IMAGING | Age: 63
Discharge: HOME | End: 2018-10-25
Payer: COMMERCIAL

## 2018-10-25 DIAGNOSIS — N63.10 LUMP OF BREAST, RIGHT: ICD-10-CM

## 2018-10-25 DIAGNOSIS — N64.4 MASTODYNIA: ICD-10-CM

## 2018-10-25 DIAGNOSIS — N63.10 BREAST MASS, RIGHT: ICD-10-CM

## 2018-10-25 DIAGNOSIS — R92.8 ABNORMAL MAMMOGRAM: ICD-10-CM

## 2018-10-25 DIAGNOSIS — Z98.890 STATUS POST BIOPSY: ICD-10-CM

## 2018-10-25 PROCEDURE — 88305 TISSUE EXAM BY PATHOLOGIST: CPT

## 2018-10-25 PROCEDURE — 2500000003 HC RX 250 WO HCPCS: Performed by: OBSTETRICS & GYNECOLOGY

## 2018-10-25 PROCEDURE — 88360 TUMOR IMMUNOHISTOCHEM/MANUAL: CPT

## 2018-10-25 PROCEDURE — 2709999900 US BIOPSY LYMPH NODE

## 2018-10-25 PROCEDURE — G0279 TOMOSYNTHESIS, MAMMO: HCPCS

## 2018-10-25 PROCEDURE — 77065 DX MAMMO INCL CAD UNI: CPT

## 2018-10-25 PROCEDURE — 76642 ULTRASOUND BREAST LIMITED: CPT

## 2018-10-25 PROCEDURE — 2709999900 US BREAST BIOPSY W LOC DEVICE 1ST LESION RIGHT

## 2018-10-25 RX ORDER — LIDOCAINE HYDROCHLORIDE AND EPINEPHRINE 10; 10 MG/ML; UG/ML
20 INJECTION, SOLUTION INFILTRATION; PERINEURAL ONCE
Status: COMPLETED | OUTPATIENT
Start: 2018-10-25 | End: 2018-10-25

## 2018-10-25 RX ORDER — LIDOCAINE HYDROCHLORIDE 10 MG/ML
10 INJECTION, SOLUTION EPIDURAL; INFILTRATION; INTRACAUDAL; PERINEURAL ONCE
Status: COMPLETED | OUTPATIENT
Start: 2018-10-25 | End: 2018-10-25

## 2018-10-25 RX ADMIN — LIDOCAINE HYDROCHLORIDE 10 ML: 10 INJECTION, SOLUTION EPIDURAL; INFILTRATION; INTRACAUDAL; PERINEURAL at 14:00

## 2018-10-25 RX ADMIN — LIDOCAINE HYDROCHLORIDE AND EPINEPHRINE 10 ML: 10; 10 INJECTION, SOLUTION INFILTRATION; PERINEURAL at 14:02

## 2018-10-31 ENCOUNTER — TELEPHONE (OUTPATIENT)
Dept: SURGERY | Age: 63
End: 2018-10-31

## 2018-10-31 NOTE — TELEPHONE ENCOUNTER
Left voice message to have patient return call to office regarding New Patient History Questions. Patient has new appointment tomorrow with Dr. Mary Cobb.

## 2018-11-01 ENCOUNTER — OFFICE VISIT (OUTPATIENT)
Dept: SURGERY | Age: 63
End: 2018-11-01
Payer: COMMERCIAL

## 2018-11-01 VITALS
HEIGHT: 61 IN | BODY MASS INDEX: 33.83 KG/M2 | WEIGHT: 179.2 LBS | TEMPERATURE: 97.9 F | HEART RATE: 78 BPM | SYSTOLIC BLOOD PRESSURE: 116 MMHG | DIASTOLIC BLOOD PRESSURE: 69 MMHG

## 2018-11-01 DIAGNOSIS — C50.911 PRIMARY BREAST MALIGNANCY, RIGHT (HCC): Primary | ICD-10-CM

## 2018-11-01 PROBLEM — C50.411 MALIGNANT NEOPLASM OF UPPER-OUTER QUADRANT OF RIGHT BREAST IN FEMALE, ESTROGEN RECEPTOR NEGATIVE (HCC): Status: ACTIVE | Noted: 2018-11-01

## 2018-11-01 PROBLEM — Z17.1 MALIGNANT NEOPLASM OF UPPER-OUTER QUADRANT OF RIGHT BREAST IN FEMALE, ESTROGEN RECEPTOR NEGATIVE (HCC): Status: ACTIVE | Noted: 2018-11-01

## 2018-11-01 PROCEDURE — 99245 OFF/OP CONSLTJ NEW/EST HI 55: CPT | Performed by: SURGERY

## 2018-11-01 ASSESSMENT — ENCOUNTER SYMPTOMS
RESPIRATORY NEGATIVE: 1
EYES NEGATIVE: 1
GASTROINTESTINAL NEGATIVE: 1

## 2018-11-10 ENCOUNTER — HOSPITAL ENCOUNTER (OUTPATIENT)
Dept: CT IMAGING | Age: 63
Discharge: HOME | End: 2018-11-10
Payer: COMMERCIAL

## 2018-11-10 DIAGNOSIS — C50.919 MALIGNANT NEOPLASM OF FEMALE BREAST, UNSPECIFIED ESTROGEN RECEPTOR STATUS, UNSPECIFIED LATERALITY, UNSPECIFIED SITE OF BREAST (HCC): ICD-10-CM

## 2018-11-10 LAB
BUN BLDV-MCNC: 25 MG/DL (ref 7–20)
CREAT SERPL-MCNC: 0.9 MG/DL (ref 0.6–1.2)
GFR AFRICAN AMERICAN: >60
GFR NON-AFRICAN AMERICAN: >60

## 2018-11-10 PROCEDURE — 71260 CT THORAX DX C+: CPT

## 2018-11-10 PROCEDURE — 82565 ASSAY OF CREATININE: CPT

## 2018-11-10 PROCEDURE — 84520 ASSAY OF UREA NITROGEN: CPT

## 2018-11-10 PROCEDURE — 36415 COLL VENOUS BLD VENIPUNCTURE: CPT

## 2018-11-10 PROCEDURE — 6360000004 HC RX CONTRAST MEDICATION: Performed by: INTERNAL MEDICINE

## 2018-11-10 PROCEDURE — 74177 CT ABD & PELVIS W/CONTRAST: CPT

## 2018-11-10 RX ADMIN — IOPAMIDOL 75 ML: 755 INJECTION, SOLUTION INTRAVENOUS at 15:17

## 2018-11-10 RX ADMIN — IOHEXOL 50 ML: 240 INJECTION, SOLUTION INTRATHECAL; INTRAVASCULAR; INTRAVENOUS; ORAL at 15:17

## 2018-11-12 ENCOUNTER — HOSPITAL ENCOUNTER (OUTPATIENT)
Dept: INTERVENTIONAL RADIOLOGY/VASCULAR | Age: 63
Discharge: HOME | End: 2018-11-12
Payer: COMMERCIAL

## 2018-11-12 VITALS
SYSTOLIC BLOOD PRESSURE: 120 MMHG | BODY MASS INDEX: 33.79 KG/M2 | OXYGEN SATURATION: 100 % | RESPIRATION RATE: 15 BRPM | TEMPERATURE: 97 F | WEIGHT: 179 LBS | HEART RATE: 78 BPM | HEIGHT: 61 IN | DIASTOLIC BLOOD PRESSURE: 71 MMHG

## 2018-11-12 DIAGNOSIS — C50.911 MALIGNANT NEOPLASM OF RIGHT FEMALE BREAST, UNSPECIFIED ESTROGEN RECEPTOR STATUS, UNSPECIFIED SITE OF BREAST (HCC): ICD-10-CM

## 2018-11-12 LAB
ANION GAP SERPL CALCULATED.3IONS-SCNC: 9 MMOL/L (ref 3–16)
APTT: 30.8 SEC (ref 26–36)
BASOPHILS ABSOLUTE: 0 K/UL (ref 0–0.2)
BASOPHILS RELATIVE PERCENT: 0.4 %
BUN BLDV-MCNC: 21 MG/DL (ref 7–20)
CALCIUM SERPL-MCNC: 10.1 MG/DL (ref 8.3–10.6)
CHLORIDE BLD-SCNC: 101 MMOL/L (ref 99–110)
CO2: 30 MMOL/L (ref 21–32)
CREAT SERPL-MCNC: 0.9 MG/DL (ref 0.6–1.2)
EOSINOPHILS ABSOLUTE: 0.2 K/UL (ref 0–0.6)
EOSINOPHILS RELATIVE PERCENT: 2.8 %
GFR AFRICAN AMERICAN: >60
GFR NON-AFRICAN AMERICAN: >60
GLUCOSE BLD-MCNC: 115 MG/DL (ref 70–99)
HCT VFR BLD CALC: 37 % (ref 36–48)
HEMOGLOBIN: 12.2 G/DL (ref 12–16)
INR BLD: 0.97 (ref 0.86–1.14)
LYMPHOCYTES ABSOLUTE: 2.1 K/UL (ref 1–5.1)
LYMPHOCYTES RELATIVE PERCENT: 30.9 %
MCH RBC QN AUTO: 29.2 PG (ref 26–34)
MCHC RBC AUTO-ENTMCNC: 32.8 G/DL (ref 31–36)
MCV RBC AUTO: 88.8 FL (ref 80–100)
MONOCYTES ABSOLUTE: 0.5 K/UL (ref 0–1.3)
MONOCYTES RELATIVE PERCENT: 6.9 %
NEUTROPHILS ABSOLUTE: 3.9 K/UL (ref 1.7–7.7)
NEUTROPHILS RELATIVE PERCENT: 59 %
PDW BLD-RTO: 13.5 % (ref 12.4–15.4)
PLATELET # BLD: 243 K/UL (ref 135–450)
PMV BLD AUTO: 7.9 FL (ref 5–10.5)
POTASSIUM SERPL-SCNC: 4.7 MMOL/L (ref 3.5–5.1)
PROTHROMBIN TIME: 11.1 SEC (ref 9.8–13)
RBC # BLD: 4.17 M/UL (ref 4–5.2)
SODIUM BLD-SCNC: 140 MMOL/L (ref 136–145)
WBC # BLD: 6.7 K/UL (ref 4–11)

## 2018-11-12 PROCEDURE — 7100000010 HC PHASE II RECOVERY - FIRST 15 MIN

## 2018-11-12 PROCEDURE — 36415 COLL VENOUS BLD VENIPUNCTURE: CPT

## 2018-11-12 PROCEDURE — 36561 INSERT TUNNELED CV CATH: CPT

## 2018-11-12 PROCEDURE — 85730 THROMBOPLASTIN TIME PARTIAL: CPT

## 2018-11-12 PROCEDURE — 85025 COMPLETE CBC W/AUTO DIFF WBC: CPT

## 2018-11-12 PROCEDURE — 7100000011 HC PHASE II RECOVERY - ADDTL 15 MIN

## 2018-11-12 PROCEDURE — 2500000003 HC RX 250 WO HCPCS: Performed by: RADIOLOGY

## 2018-11-12 PROCEDURE — 85610 PROTHROMBIN TIME: CPT

## 2018-11-12 PROCEDURE — 6370000000 HC RX 637 (ALT 250 FOR IP): Performed by: RADIOLOGY

## 2018-11-12 PROCEDURE — 99153 MOD SED SAME PHYS/QHP EA: CPT

## 2018-11-12 PROCEDURE — 76937 US GUIDE VASCULAR ACCESS: CPT

## 2018-11-12 PROCEDURE — 80048 BASIC METABOLIC PNL TOTAL CA: CPT

## 2018-11-12 PROCEDURE — 99152 MOD SED SAME PHYS/QHP 5/>YRS: CPT

## 2018-11-12 PROCEDURE — C1769 GUIDE WIRE: HCPCS

## 2018-11-12 PROCEDURE — 6360000002 HC RX W HCPCS: Performed by: RADIOLOGY

## 2018-11-12 PROCEDURE — 77001 FLUOROGUIDE FOR VEIN DEVICE: CPT

## 2018-11-12 RX ORDER — LIDOCAINE HYDROCHLORIDE 10 MG/ML
10 INJECTION, SOLUTION EPIDURAL; INFILTRATION; INTRACAUDAL; PERINEURAL ONCE
Status: COMPLETED | OUTPATIENT
Start: 2018-11-12 | End: 2018-11-12

## 2018-11-12 RX ORDER — FENTANYL CITRATE 50 UG/ML
INJECTION, SOLUTION INTRAMUSCULAR; INTRAVENOUS
Status: COMPLETED | OUTPATIENT
Start: 2018-11-12 | End: 2018-11-12

## 2018-11-12 RX ORDER — BUPIVACAINE HYDROCHLORIDE AND EPINEPHRINE 2.5; 5 MG/ML; UG/ML
7.5 INJECTION, SOLUTION EPIDURAL; INFILTRATION; INTRACAUDAL; PERINEURAL ONCE
Status: COMPLETED | OUTPATIENT
Start: 2018-11-12 | End: 2018-11-12

## 2018-11-12 RX ORDER — DIPHENHYDRAMINE HCL 25 MG
TABLET ORAL
Status: COMPLETED | OUTPATIENT
Start: 2018-11-12 | End: 2018-11-12

## 2018-11-12 RX ORDER — CEFAZOLIN SODIUM 2 G/100ML
2 INJECTION, SOLUTION INTRAVENOUS ONCE
Status: COMPLETED | OUTPATIENT
Start: 2018-11-12 | End: 2018-11-12

## 2018-11-12 RX ORDER — LIDOCAINE HYDROCHLORIDE AND EPINEPHRINE BITARTRATE 20; .01 MG/ML; MG/ML
20 INJECTION, SOLUTION SUBCUTANEOUS ONCE
Status: COMPLETED | OUTPATIENT
Start: 2018-11-12 | End: 2018-11-12

## 2018-11-12 RX ORDER — BACTERIOSTATIC SODIUM CHLORIDE 0.9 %
15 VIAL (ML) INJECTION PRN
Status: DISCONTINUED | OUTPATIENT
Start: 2018-11-12 | End: 2018-11-13 | Stop reason: HOSPADM

## 2018-11-12 RX ORDER — ACETAMINOPHEN 325 MG/1
650 TABLET ORAL EVERY 4 HOURS PRN
Status: DISCONTINUED | OUTPATIENT
Start: 2018-11-12 | End: 2018-11-13 | Stop reason: HOSPADM

## 2018-11-12 RX ADMIN — CEFAZOLIN SODIUM 2 G: 2 INJECTION, SOLUTION INTRAVENOUS at 10:49

## 2018-11-12 RX ADMIN — DIPHENHYDRAMINE HCL 50 MG: 25 TABLET ORAL at 11:00

## 2018-11-12 RX ADMIN — LIDOCAINE HYDROCHLORIDE,EPINEPHRINE BITARTRATE 3 ML: 20; .01 INJECTION, SOLUTION INFILTRATION; PERINEURAL at 11:39

## 2018-11-12 RX ADMIN — FENTANYL CITRATE 50 MCG: 50 INJECTION INTRAMUSCULAR; INTRAVENOUS at 11:09

## 2018-11-12 RX ADMIN — BUPIVACAINE HYDROCHLORIDE AND EPINEPHRINE BITARTRATE 3 ML: 2.5; .005 INJECTION, SOLUTION EPIDURAL; INFILTRATION; INTRACAUDAL; PERINEURAL at 11:41

## 2018-11-12 RX ADMIN — FENTANYL CITRATE 50 MCG: 50 INJECTION INTRAMUSCULAR; INTRAVENOUS at 11:04

## 2018-11-12 RX ADMIN — FENTANYL CITRATE 50 MCG: 50 INJECTION INTRAMUSCULAR; INTRAVENOUS at 11:23

## 2018-11-12 RX ADMIN — HEPARIN SODIUM IN SODIUM CHLORIDE 20 ML/HR: 200 INJECTION INTRAVENOUS at 11:42

## 2018-11-12 RX ADMIN — Medication: at 11:41

## 2018-11-12 RX ADMIN — LIDOCAINE HYDROCHLORIDE 10 ML: 10 INJECTION, SOLUTION EPIDURAL; INFILTRATION; INTRACAUDAL; PERINEURAL at 11:40

## 2018-11-12 RX ADMIN — FENTANYL CITRATE 50 MCG: 50 INJECTION INTRAMUSCULAR; INTRAVENOUS at 11:01

## 2018-11-12 RX ADMIN — SODIUM CHLORIDE 7 ML: 9 INJECTION, SOLUTION INTRAMUSCULAR; INTRAVENOUS; SUBCUTANEOUS at 11:40

## 2018-11-12 ASSESSMENT — PAIN SCALES - GENERAL
PAINLEVEL_OUTOF10: 2
PAINLEVEL_OUTOF10: 2

## 2018-11-13 ENCOUNTER — HOSPITAL ENCOUNTER (OUTPATIENT)
Dept: NON INVASIVE DIAGNOSTICS | Age: 63
Discharge: HOME | End: 2018-11-13
Payer: COMMERCIAL

## 2018-11-13 LAB
LEFT VENTRICULAR EJECTION FRACTION HIGH VALUE: 55 %
LEFT VENTRICULAR EJECTION FRACTION MODE: NORMAL
LV EF: 50 %
LV EF: 53 %
LVEF MODALITY: NORMAL

## 2018-11-13 PROCEDURE — 93306 TTE W/DOPPLER COMPLETE: CPT

## 2018-11-13 NOTE — PROGRESS NOTES
Left patient a message post portacath procedure, and directed patient to contact Special Procedures if they had any further questions or concerns.

## 2018-11-21 RX ORDER — METFORMIN HYDROCHLORIDE 500 MG/1
TABLET, EXTENDED RELEASE ORAL
Qty: 270 TABLET | Refills: 2 | Status: SHIPPED | OUTPATIENT
Start: 2018-11-21 | End: 2019-05-29 | Stop reason: SDUPTHER

## 2018-12-03 DIAGNOSIS — M17.0 PRIMARY OSTEOARTHRITIS OF BOTH KNEES: ICD-10-CM

## 2018-12-03 DIAGNOSIS — G89.29 CHRONIC MIDLINE LOW BACK PAIN WITHOUT SCIATICA: ICD-10-CM

## 2018-12-03 DIAGNOSIS — M54.50 CHRONIC MIDLINE LOW BACK PAIN WITHOUT SCIATICA: ICD-10-CM

## 2018-12-03 RX ORDER — TRAMADOL HYDROCHLORIDE 50 MG/1
TABLET ORAL
Qty: 100 TABLET | Refills: 0 | Status: SHIPPED | OUTPATIENT
Start: 2018-12-03 | End: 2019-01-25 | Stop reason: SDUPTHER

## 2018-12-04 ENCOUNTER — TELEPHONE (OUTPATIENT)
Dept: RHEUMATOLOGY | Age: 63
End: 2018-12-04

## 2018-12-12 ENCOUNTER — TELEPHONE (OUTPATIENT)
Dept: GENERAL RADIOLOGY | Age: 63
End: 2018-12-12

## 2019-01-15 ENCOUNTER — TELEPHONE (OUTPATIENT)
Dept: FAMILY MEDICINE CLINIC | Age: 64
End: 2019-01-15

## 2019-01-17 ENCOUNTER — OFFICE VISIT (OUTPATIENT)
Dept: SURGERY | Age: 64
End: 2019-01-17
Payer: COMMERCIAL

## 2019-01-17 VITALS
OXYGEN SATURATION: 97 % | RESPIRATION RATE: 18 BRPM | SYSTOLIC BLOOD PRESSURE: 104 MMHG | BODY MASS INDEX: 31.72 KG/M2 | HEART RATE: 114 BPM | DIASTOLIC BLOOD PRESSURE: 70 MMHG | WEIGHT: 168 LBS | HEIGHT: 61 IN | TEMPERATURE: 98.4 F

## 2019-01-17 DIAGNOSIS — C50.911 PRIMARY BREAST MALIGNANCY, RIGHT (HCC): Primary | ICD-10-CM

## 2019-01-17 PROCEDURE — 99213 OFFICE O/P EST LOW 20 MIN: CPT | Performed by: SURGERY

## 2019-01-17 RX ORDER — PROCHLORPERAZINE MALEATE 10 MG
10 TABLET ORAL EVERY 6 HOURS PRN
COMMUNITY
End: 2019-05-02

## 2019-01-17 ASSESSMENT — ENCOUNTER SYMPTOMS
RESPIRATORY NEGATIVE: 1
GASTROINTESTINAL NEGATIVE: 1
EYES NEGATIVE: 1

## 2019-01-25 DIAGNOSIS — G89.29 CHRONIC MIDLINE LOW BACK PAIN WITHOUT SCIATICA: ICD-10-CM

## 2019-01-25 DIAGNOSIS — M17.0 PRIMARY OSTEOARTHRITIS OF BOTH KNEES: ICD-10-CM

## 2019-01-25 DIAGNOSIS — M54.50 CHRONIC MIDLINE LOW BACK PAIN WITHOUT SCIATICA: ICD-10-CM

## 2019-01-30 ENCOUNTER — OFFICE VISIT (OUTPATIENT)
Dept: FAMILY MEDICINE CLINIC | Age: 64
End: 2019-01-30
Payer: COMMERCIAL

## 2019-01-30 VITALS
BODY MASS INDEX: 31.74 KG/M2 | DIASTOLIC BLOOD PRESSURE: 68 MMHG | HEART RATE: 105 BPM | WEIGHT: 168 LBS | SYSTOLIC BLOOD PRESSURE: 116 MMHG | OXYGEN SATURATION: 98 %

## 2019-01-30 DIAGNOSIS — D64.9 ANEMIA, UNSPECIFIED TYPE: ICD-10-CM

## 2019-01-30 DIAGNOSIS — R00.2 PALPITATIONS: Primary | ICD-10-CM

## 2019-01-30 DIAGNOSIS — M17.0 PRIMARY OSTEOARTHRITIS OF BOTH KNEES: ICD-10-CM

## 2019-01-30 DIAGNOSIS — E11.9 TYPE 2 DIABETES MELLITUS WITHOUT COMPLICATION, WITHOUT LONG-TERM CURRENT USE OF INSULIN (HCC): ICD-10-CM

## 2019-01-30 DIAGNOSIS — C50.411 MALIGNANT NEOPLASM OF UPPER-OUTER QUADRANT OF RIGHT BREAST IN FEMALE, ESTROGEN RECEPTOR NEGATIVE (HCC): ICD-10-CM

## 2019-01-30 DIAGNOSIS — Z17.1 MALIGNANT NEOPLASM OF UPPER-OUTER QUADRANT OF RIGHT BREAST IN FEMALE, ESTROGEN RECEPTOR NEGATIVE (HCC): ICD-10-CM

## 2019-01-30 LAB — HBA1C MFR BLD: 6.2 %

## 2019-01-30 PROCEDURE — 93000 ELECTROCARDIOGRAM COMPLETE: CPT | Performed by: INTERNAL MEDICINE

## 2019-01-30 PROCEDURE — 99214 OFFICE O/P EST MOD 30 MIN: CPT | Performed by: INTERNAL MEDICINE

## 2019-01-30 PROCEDURE — 83036 HEMOGLOBIN GLYCOSYLATED A1C: CPT | Performed by: INTERNAL MEDICINE

## 2019-01-30 RX ORDER — LORATADINE 10 MG/1
10 TABLET ORAL DAILY
COMMUNITY

## 2019-01-30 RX ORDER — LIDOCAINE AND PRILOCAINE 25; 25 MG/G; MG/G
CREAM TOPICAL
COMMUNITY
Start: 2018-11-09 | End: 2019-05-13 | Stop reason: ALTCHOICE

## 2019-01-30 RX ORDER — TRAMADOL HYDROCHLORIDE 50 MG/1
TABLET ORAL
Qty: 100 TABLET | Refills: 0 | Status: SHIPPED | OUTPATIENT
Start: 2019-01-30 | End: 2019-03-18 | Stop reason: SDUPTHER

## 2019-01-30 ASSESSMENT — ENCOUNTER SYMPTOMS
ABDOMINAL PAIN: 0
SHORTNESS OF BREATH: 0
COUGH: 0
CHEST TIGHTNESS: 0
DIARRHEA: 0
NAUSEA: 0
VOMITING: 0
SINUS PRESSURE: 0
CONSTIPATION: 0

## 2019-03-06 RX ORDER — CITALOPRAM 10 MG/1
TABLET ORAL
Qty: 90 TABLET | Refills: 1 | Status: SHIPPED | OUTPATIENT
Start: 2019-03-06 | End: 2019-09-08 | Stop reason: SDUPTHER

## 2019-03-18 ENCOUNTER — HOSPITAL ENCOUNTER (OUTPATIENT)
Dept: ULTRASOUND IMAGING | Age: 64
Discharge: HOME | End: 2019-03-18
Payer: COMMERCIAL

## 2019-03-18 ENCOUNTER — HOSPITAL ENCOUNTER (OUTPATIENT)
Dept: WOMENS IMAGING | Age: 64
Discharge: HOME | End: 2019-03-18
Payer: COMMERCIAL

## 2019-03-18 DIAGNOSIS — M54.50 CHRONIC MIDLINE LOW BACK PAIN WITHOUT SCIATICA: ICD-10-CM

## 2019-03-18 DIAGNOSIS — G89.29 CHRONIC MIDLINE LOW BACK PAIN WITHOUT SCIATICA: ICD-10-CM

## 2019-03-18 DIAGNOSIS — C50.911 PRIMARY BREAST MALIGNANCY, RIGHT (HCC): ICD-10-CM

## 2019-03-18 DIAGNOSIS — M17.0 PRIMARY OSTEOARTHRITIS OF BOTH KNEES: ICD-10-CM

## 2019-03-18 PROCEDURE — G0279 TOMOSYNTHESIS, MAMMO: HCPCS

## 2019-03-18 PROCEDURE — 76642 ULTRASOUND BREAST LIMITED: CPT

## 2019-03-18 RX ORDER — TRAMADOL HYDROCHLORIDE 50 MG/1
TABLET ORAL
Qty: 100 TABLET | Refills: 0 | Status: SHIPPED | OUTPATIENT
Start: 2019-03-18 | End: 2019-04-29 | Stop reason: SDUPTHER

## 2019-03-18 ASSESSMENT — ENCOUNTER SYMPTOMS
EYES NEGATIVE: 1
RESPIRATORY NEGATIVE: 1
GASTROINTESTINAL NEGATIVE: 1

## 2019-03-25 ENCOUNTER — OFFICE VISIT (OUTPATIENT)
Dept: SURGERY | Age: 64
End: 2019-03-25
Payer: COMMERCIAL

## 2019-03-25 ENCOUNTER — PREP FOR PROCEDURE (OUTPATIENT)
Dept: SURGERY | Age: 64
End: 2019-03-25

## 2019-03-25 VITALS
TEMPERATURE: 99.4 F | SYSTOLIC BLOOD PRESSURE: 115 MMHG | WEIGHT: 179 LBS | OXYGEN SATURATION: 99 % | HEIGHT: 61 IN | BODY MASS INDEX: 33.79 KG/M2 | DIASTOLIC BLOOD PRESSURE: 70 MMHG | HEART RATE: 92 BPM

## 2019-03-25 DIAGNOSIS — C50.911 PRIMARY BREAST MALIGNANCY, RIGHT (HCC): Primary | ICD-10-CM

## 2019-03-25 PROCEDURE — 99214 OFFICE O/P EST MOD 30 MIN: CPT | Performed by: SURGERY

## 2019-03-25 RX ORDER — LIDOCAINE HYDROCHLORIDE 10 MG/ML
0.1 INJECTION, SOLUTION EPIDURAL; INFILTRATION; INTRACAUDAL; PERINEURAL
Status: CANCELLED | OUTPATIENT
Start: 2019-03-25 | End: 2019-03-25

## 2019-03-25 RX ORDER — SODIUM CHLORIDE, SODIUM LACTATE, POTASSIUM CHLORIDE, CALCIUM CHLORIDE 600; 310; 30; 20 MG/100ML; MG/100ML; MG/100ML; MG/100ML
INJECTION, SOLUTION INTRAVENOUS CONTINUOUS
Status: CANCELLED | OUTPATIENT
Start: 2019-03-25

## 2019-03-25 RX ORDER — SODIUM CHLORIDE 0.9 % (FLUSH) 0.9 %
10 SYRINGE (ML) INJECTION EVERY 12 HOURS SCHEDULED
Status: CANCELLED | OUTPATIENT
Start: 2019-03-25

## 2019-03-25 RX ORDER — SODIUM CHLORIDE 0.9 % (FLUSH) 0.9 %
10 SYRINGE (ML) INJECTION PRN
Status: CANCELLED | OUTPATIENT
Start: 2019-03-25

## 2019-04-03 ENCOUNTER — HOSPITAL ENCOUNTER (OUTPATIENT)
Dept: PHYSICAL THERAPY | Age: 64
Setting detail: THERAPIES SERIES
Discharge: HOME | End: 2019-04-03
Payer: COMMERCIAL

## 2019-04-03 PROCEDURE — 97110 THERAPEUTIC EXERCISES: CPT

## 2019-04-03 PROCEDURE — 97530 THERAPEUTIC ACTIVITIES: CPT

## 2019-04-03 PROCEDURE — 97162 PT EVAL MOD COMPLEX 30 MIN: CPT

## 2019-04-03 NOTE — PLAN OF CARE
Outpatient Physical Therapy     Phone: [phone] Fax: [phone]     To: Referring Practitioner: Richie Collier MD      Patient: Eduar Del Rio   : 1955   MRN: [id number]  Evaluation Date: 2019      Diagnosis Information:  · Diagnosis: Primary Breast Malignancy C50.911   · Treatment Diagnosis: R Breast cancer sx on 19  with maximum lymph node excision,  at risk for development of lymphedema     Physical Therapy Certification/Re-Certification Form  Dear Dr. Guanaco Thomas  The following patient has been evaluated for physical therapy services. Please review the attached evaluation and/or summary of the patient's plan of care, and verify that you agree therapy should continue by signing the attached document and sending it back to our office. Plan of Care/Treatment to date:  [] Therapeutic Exercise      [] Modalities:  [] Therapeutic Activity        [] Ultrasound    [] Gait Training        [] Cervical Traction   [] Neuromuscular Re-education      [] Cold/hotpack    [] Instruction in HEP        [] Lumbar Traction  [] Manual Therapy        [] Electrical Stimulation            [] Aquatic Therapy        [] Iontophoresis        ? [x] Lymphedema management  [] Women's Health     Other:  [] Vestibular Rehab        [x]   Pre-op oncology  education    []  Needed     Frequency/Duration:  # Days per week: [x] 1 day # Weeks: [] 1 week [] 5 weeks     [] 2 days? [] 2 weeks [] 6 weeks     [] 3 days   [] 3 weeks [] 7 weeks     [] 4 days   [] 4 weeks [] 8 weeks    Rehab Potential: [] Excellent [x] Good [] Fair  [] Poor     Electronically signed by:  Johanna Boogie  [id number]    If you have any questions or concerns, please don't hesitate to call.   Thank you for your referral.      Physician Signature:________________________________Date:__________________  By signing above, therapists plan is approved by physician

## 2019-04-03 NOTE — FLOWSHEET NOTE
Physical Therapy Daily Treatment Note  Date:  2019    Patient Name:  Mago Shaw    :  1955  MRN: [id number]  Restrictions/Precautions:    Medical/Treatment Diagnosis Information:   Diagnosis: Primary Breast Malignancy C50.911  Treatment Diagnosis: R Breast cancer sx on 19  with maximum lymph node excision,  at risk for development of lymphedema    Tracking Information:  Physician Information Referring Practitioner: Lalitha Koehler MD     Plan of Care Sent Date:  Signed Received:    Visit Count / Total Visits  /    Insurance Approved Visits  /  Approved Dates:     Insurance Information PT Insurance Information: A[employer] 20 v/cy  *If only certain CPT codes approved use smart phrase CPT calculator . OPPTINSURANCECPTCODECALCULATOR   Progress Note/G-codes   [x]  Yes  []  No Next Due:      Pain level: /10     Subjective:  See eval  Objective:  See eval  Observation:   Test measurements:      Exercises:  Exercise/Equipment Resistance/Repetitions Other comments                                                                            Other Therapeutic Activities:  4/3/19Evaluation completed. Discussed Plan of care,  benefits,  goals and options of PT. Pt agrees with goals.   Pt educated re :   See Evaluation      Home Exercise Program:    See evaluation    Manual Treatments:      Modalities:      Timed Code Treatment Minutes:  40    Total Treatment Minutes:  60    Treatment/Activity Tolerance:  [x] Patient tolerated treatment well [] Patient limited by fatigue  [] Patient limited by pain  [] Patient limited by other medical complications  [] Other:     Prognosis: [x] Good [] Fair  [] Poor    Patient Requires Follow-up: [] Yes  [x] No    Plan:   [] Continue per plan of care [] Alter current plan (see comments)  [] Plan of care initiated [] Hold pending MD visit [x] Discharge  Plan for Next Session:      Electronically signed by:  Asaf Johnson, Buster Naren Pereira

## 2019-04-03 NOTE — PROGRESS NOTES
Physical Therapy  Initial Assessment  Date: 2019  Patient Name: Tushar Lockhart  MRN: [id number]  : 1955     Treatment Diagnosis: R Breast cancer sx on 19  with maximum lymph node excision,  at risk for development of lymphedema    Restrictions - none       Subjective   General  Chart Reviewed: Yes  Family / Caregiver Present: No  Referring Practitioner: Jacklyn Rivera MD  Diagnosis: Primary Breast Malignancy C50.911  PT Visit Information  Onset Date: 10/01/19  PT Insurance Information: Aetna 20 v/cy  Total # of Visits Approved: 1  Subjective  Subjective: Subject stated noticed lump in R breast, was diagnosed  18  . is scheduled for lumpectomy on 19. she also stated will have all lymph nodes removed in R axilla, f/b radiation therapy. Pain Screening  Patient Currently in Pain: No       Vision/Hearing  Vision  Vision: Within Functional Limits  Hearing  Hearing: Within functional limits    Orientation  wNL       Social/Functional History  Social/Functional History  Lives With: Family  Type of Home: Apartment  Home Access: Stairs to enter with rails  Entrance Stairs - Number of Steps: 12-13  Entrance Stairs - Rails: Right  Bathroom Shower/Tub: Tub/Shower unit  Bathroom Toilet: Standard  Home Equipment: Cane;Standard walker  Receives Help From: Family(sister lives in apartment downstairs)  ADL Assistance: Independent  Homemaking Responsibilities: Yes  Ambulation Assistance: Independent  Transfer Assistance: Independent  Active : Yes  Mode of Transportation: Car  Occupation: Full time employment  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 Leisure & Hobbies: ativities with grandchildren    Objective       Body weight:  182  Tissue texture: normal  Functional reach: can pt reach from axilla to hip along:  R lateral trunk? Y  L lateral trunk?  Y  Hand dominance:  Right  Upper Extremity ROM & Strength AROM Right  AROM Left Strength Right Strength Left   Date 4/3 4/3 4/3 4/3   Shoulder flex 145 148 4+/5 5/5   Shoulder  130 4+/5 5/5   Shoulder ER 90 85 5/5 5/5   Shoulder IR 70 70 5/5 5/5                     GIRTH MEASUREMENT  (Tape on skin along anterior arm)    Upper Extremity Right (cm) Left (cm)   Date 4/3/19 4/3/19   Axillary     55 Cm above distal end 3rd digit 39.2 39   50 Cm above distal end 3rd digit 34.5 33.0   45 Cm above distal end 3rd digit 31.8 31.5   30 Cm above distal end 3rd digit 24.5 24.8   20 Cm above distal end 3rd digit 17.8 17   Wrist - Styloids 17.0 17   Distal Rios Crease 19.9 19.5   Thumbs Proximal Phalanx 6.5 6.5             Total Girth 191.2 188. 3     Classification of lymphedema:  R UE 2.9 cm greater than L  = Mild  Mild:    Less than 3 centimeters (cm) differential between affected limb and unaffected limb  Moderate:   3 to 5 cm differential between affected limb and unaffected limb. Severe:   5+  cm differential between affected limb and unaffected limb. Patient was thoroughly educated regarding prehabilitation goals, importance of PT sessions in improving overall ROM, strength, function prior to surgery, and how prehabilitation will facilitate improved post-operative outcomes and reduce risk of development of lymphedema. The patient was advised as to appropriate timeframe to f/u with PT post op. The patient was educated on and instructed in HEP as listed. The patient was given a detailed handout of exercises with frequency, duration and progression, to initiate in the hospital post-operatively and at home:   Self ROM:  Table slides for flexion, ER and WBD, self AA flexion supine or sitting using B hands;  Scapular retraction in external rotation (supine with hands behind head); Pendulums forward and back. Assisted ROM:   Supine flexion/diagonal, Seated Flexion, Extension sitting,  IR in ABD in sitting,  ER in ABD in sitting.     The HEP handout included the patients post-operative precautions and restrictions which are as follows:  1. No heavy lifting > 7#  2. No sweating into incision  3. No pool x 6 wks  4. Scar massage OK at 6 wks post op  5. No ROM restrictions  Additionally, the patient was educated and given handouts re: lymphedema prevention & management and signs/symptoms necessitating prompt f/u with MD.   The patient was educated regarding the benefits of early physical therapy post-operatively to regain normal ROM, strength, functional use of UE. The patient was also educated regarding the benefits of consistent manual lymph drainage to reduce risk of lymphedema. Additionally, the patient was educated regarding goals and expectations of physical therapy to restore normal function and reduce swelling. Assessment   Conditions Requiring Skilled Therapeutic Intervention  Assessment: Pt  will be undergoing surgery for breast cancer,  was advised by MD that maximum lymph node excision will be expected during surgery, placing her at risk for development of lymphedema. Treatment Diagnosis: R Breast cancer sx on 5/8/19  with maximum lymph node excision,  at risk for development of lymphedema  Prognosis: Good  Decision Making: Medium Complexity  History: Hx of several rounds of chemo for breast cancer scheduled for sx on 5/8/19  REQUIRES PT FOLLOW UP: No  Activity Tolerance  Activity Tolerance: Patient Tolerated treatment well         Plan   Pt educated on need to return to therapy based upon presence of symptoms                  QuickDASH Total Score: 26       QuickDASH Disability/Symptom Score : 34.09 %                      Goals  Short term goals  Time Frame for Short term goals: Pt educated on Post operative procedures,  educated on s/s of lymphedema/complications,  methods to decrease risk. need for compression sleeve post operatively due to maximum lymph node removal anticipated. Patient Goals   Patient goals : pt  goal  is to understand complications and lymphedema prognosis and outcomes.          Therapy Time

## 2019-04-16 ENCOUNTER — TELEPHONE (OUTPATIENT)
Dept: SURGERY | Age: 64
End: 2019-04-16

## 2019-04-16 NOTE — TELEPHONE ENCOUNTER
Spoke with patient this morning to inform her that her original surgery scheduled for 5-8-19 has been moved up to 5-7-19 same surgery start time, same arrival time to the [facility]. Patient instructed to follow all pre-op instructions that were previously provided to her when she was in the office. Patient verbalized understanding.

## 2019-04-26 ENCOUNTER — TELEPHONE (OUTPATIENT)
Dept: FAMILY MEDICINE CLINIC | Age: 64
End: 2019-04-26

## 2019-04-26 NOTE — TELEPHONE ENCOUNTER
Pt was originally scheduled for 05/01/2019 with provider. R/s due to provider's availability and the pt is scheduled for breast surgery on 05/07/2019. There were no sooner appt before scheduled surgery date. Per pt is requesting for office to leave a message if not available.      Thanks

## 2019-04-29 ENCOUNTER — OFFICE VISIT (OUTPATIENT)
Dept: FAMILY MEDICINE CLINIC | Age: 64
End: 2019-04-29
Payer: COMMERCIAL

## 2019-04-29 ENCOUNTER — TELEPHONE (OUTPATIENT)
Dept: FAMILY MEDICINE CLINIC | Age: 64
End: 2019-04-29

## 2019-04-29 VITALS
HEART RATE: 94 BPM | DIASTOLIC BLOOD PRESSURE: 60 MMHG | WEIGHT: 187 LBS | BODY MASS INDEX: 35.3 KG/M2 | OXYGEN SATURATION: 98 % | HEIGHT: 61 IN | TEMPERATURE: 97.6 F | SYSTOLIC BLOOD PRESSURE: 100 MMHG

## 2019-04-29 DIAGNOSIS — C50.411 MALIGNANT NEOPLASM OF UPPER-OUTER QUADRANT OF RIGHT BREAST IN FEMALE, ESTROGEN RECEPTOR NEGATIVE (HCC): ICD-10-CM

## 2019-04-29 DIAGNOSIS — M17.0 PRIMARY OSTEOARTHRITIS OF BOTH KNEES: ICD-10-CM

## 2019-04-29 DIAGNOSIS — Z01.818 PREOP EXAMINATION: Primary | ICD-10-CM

## 2019-04-29 DIAGNOSIS — E78.00 PURE HYPERCHOLESTEROLEMIA: ICD-10-CM

## 2019-04-29 DIAGNOSIS — Z17.1 MALIGNANT NEOPLASM OF UPPER-OUTER QUADRANT OF RIGHT BREAST IN FEMALE, ESTROGEN RECEPTOR NEGATIVE (HCC): ICD-10-CM

## 2019-04-29 DIAGNOSIS — I10 ESSENTIAL HYPERTENSION: ICD-10-CM

## 2019-04-29 DIAGNOSIS — M54.50 CHRONIC MIDLINE LOW BACK PAIN WITHOUT SCIATICA: ICD-10-CM

## 2019-04-29 DIAGNOSIS — E11.9 TYPE 2 DIABETES MELLITUS WITHOUT COMPLICATION, WITHOUT LONG-TERM CURRENT USE OF INSULIN (HCC): ICD-10-CM

## 2019-04-29 DIAGNOSIS — G89.29 CHRONIC MIDLINE LOW BACK PAIN WITHOUT SCIATICA: ICD-10-CM

## 2019-04-29 PROCEDURE — 99214 OFFICE O/P EST MOD 30 MIN: CPT | Performed by: PHYSICIAN ASSISTANT

## 2019-04-29 PROCEDURE — 93000 ELECTROCARDIOGRAM COMPLETE: CPT | Performed by: PHYSICIAN ASSISTANT

## 2019-04-29 RX ORDER — TRAMADOL HYDROCHLORIDE 50 MG/1
50 TABLET ORAL EVERY 8 HOURS PRN
Qty: 100 TABLET | Refills: 0 | Status: SHIPPED | OUTPATIENT
Start: 2019-04-29 | End: 2019-07-09 | Stop reason: SDUPTHER

## 2019-04-29 NOTE — TELEPHONE ENCOUNTER
Refill ordered. Needs appt before next refill. Notify our office if her surgeon prescribes any pain meds  Controlled Substances Monitoring:     RX Monitoring 4/29/2019   Attestation The Prescription Monitoring Report for this patient was reviewed today.    Acute Pain Prescriptions -   Chronic Pain Routine Monitoring No signs of potential drug abuse or diversion identified: otherwise, see note documentation   Chronic Pain > 80 MEDD -

## 2019-04-29 NOTE — PATIENT INSTRUCTIONS
Bharat Umana was seen today for pre-op exam.    Diagnoses and all orders for this visit:    Preop examination  -     CBC Auto Differential  -     EKG 12 Lead  -     TYPE AND SCREEN; Future  -     PROTIME-INR; Future    Type 2 diabetes mellitus without complication, without long-term current use of insulin (HCC)  -     Comprehensive Metabolic Panel  -     Microalbumin / Creatinine Urine Ratio  -     Hemoglobin A1C    Malignant neoplasm of upper-outer quadrant of right breast in female, estrogen receptor negative (HCC)    Essential hypertension  -     Comprehensive Metabolic Panel    Pure hypercholesterolemia  -     LIPID PANEL; Future       Will clear after I get labs back.

## 2019-04-29 NOTE — PROGRESS NOTES
Preoperative Consultation    Daina Sapp  YOB: 1955    This patient presents to the office today for a preoperative consultation at the request of surgeon, Dr. Shakira Villarreal, who plans on performing right breast lumpectomy on May 7 at [facility].  She had a mammogram in October and it detected an abnormality. She has had chemo up until recently and will get radiation after the surgery. She has stable, controlled and medicated HTN and Diabetes. She has no SE's to her medicines.     Planned anesthesia: General   Known anesthesia problems: None   Bleeding risk: No recent or remote history of abnormal bleeding  Personal or FH of DVT/PE: No      Patient Active Problem List   Diagnosis    Sleep apnea    Esophageal reflux    Pure hypercholesterolemia    Other and unspecified hyperlipidemia    Hypertonicity of bladder    Malaise and fatigue    Symptomatic menopausal or female climacteric states    Anemia    Type 2 diabetes mellitus without complication (HCC)    Depression    Midline low back pain without sciatica    Primary osteoarthritis of both knees    History of uterine cancer    Bilateral carpal tunnel syndrome    Chronic seasonal allergic rhinitis due to pollen    Intrinsic eczema    Malignant neoplasm of upper-outer quadrant of right breast in female, estrogen receptor negative ([facility] [address]     Past Surgical History:   Procedure Laterality Date    ABDOMEN SURGERY      at age 10 mos--remove tissue    BREAST SURGERY Right     cyst removed    CARPAL TUNNEL RELEASE Right 2017    CARPAL TUNNEL RELEASE Left 2017    Left  Carpal Tunnel Release & Left Ulnar Nerve decompression at the Cubital Tunnel      SECTION      COLONOSCOPY      FINGER TRIGGER RELEASE Right 2017    Index Finger    HYSTERECTOMY      d/t uterine cancer    KNEE ARTHROSCOPY Right 2017    RIGHT KNEE ARTHROSCOPY, PARTIAL MEDIAL MENISCECTOMY    TONSILLECTOMY      ULNAR TUNNEL RELEASE Right 02/14/2017    UPPER GASTROINTESTINAL ENDOSCOPY         Allergies   Allergen Reactions    Advil Pm [Ibuprofen-Diphenhydramine Cit] Shortness Of Breath     All NSAID    Aspirin Shortness Of Breath    Zithromax [Azithromycin Dihydrate] Nausea And Vomiting and Other (See Comments)     Stomach cramping    Ibuprofen     Lisinopril Other (See Comments)     Angioedema, 4/2018    Xanax [Alprazolam] Other (See Comments)     Slurred speech  Patient felt \"out of control\"     Outpatient Medications Marked as Taking for the 4/29/19 encounter (Office Visit) with DEBORAH Chambers   Medication Sig Dispense Refill    citalopram (CELEXA) 10 MG tablet TAKE ONE TABLET BY MOUTH DAILY 90 tablet 1    diclofenac 1.5 % SOLN APPLY 2 SQUIRTS TO THE AFFECTED AREA TWO TIMES A  mL 1    loratadine (CLARITIN) 10 MG tablet Take 10 mg by mouth daily      lidocaine-prilocaine (EMLA) 2.5-2.5 % cream       prochlorperazine (COMPAZINE) 10 MG tablet Take 10 mg by mouth every 6 hours as needed      metFORMIN (GLUCOPHAGE-XR) 500 MG extended release tablet TAKE ONE TABLET BY MOUTH THREE TIMES A DAY WITH MEALS 270 tablet 2    omeprazole (PRILOSEC) 40 MG delayed release capsule One daily 90 capsule 3    oxybutynin (DITROPAN) 5 MG tablet Take 1 tablet by mouth 2 times daily 180 tablet 3    pravastatin (PRAVACHOL) 20 MG tablet TAKE 1 TABLET DAILY 90 tablet 3    Calcium Carbonate-Vit D-Min (CALCIUM 1200 PO) Take by mouth      Cholecalciferol (VITAMIN D3) 2000 units CAPS Take by mouth      vitamin C (ASCORBIC ACID) 500 MG tablet Take 500 mg by mouth daily      losartan (COZAAR) 50 MG tablet Take 1 tablet by mouth daily 90 tablet 3    triamcinolone (KENALOG) 0.1 % ointment Apply topically 2 times daily 80 g 2    Glucose Blood ([employer] TEST VI) 1 Device by In Vitro route.  glucose blood VI test strips ([employer] TEST STRIPS) strip 1 each by In Vitro route daily. As needed.          Social History     Tobacco Use  Smoking status: Former Smoker     Years: 10.00     Types: Cigarettes     Last attempt to quit: 1995     Years since quittin.0    Smokeless tobacco: Never Used   Substance Use Topics    Alcohol use: No     Alcohol/week: 0.0 oz     Family History   Problem Relation Age of Onset    COPD Mother     Heart Disease Father     Cancer Other     Diabetes Other     Heart Disease Other     High Blood Pressure Other     Seizures Other     Diabetes Sister        Review of Systems  A comprehensive review of systems was negative except for what was noted in the HPI. Physical Exam   /60 (Site: Left Upper Arm, Position: Sitting, Cuff Size: Medium Adult)   Pulse 94   Temp 97.6 °F (36.4 °C) (Temporal)   Ht 5' 1\" (1.549 m)   Wt 187 lb (84.8 kg)   SpO2 98%   BMI 35.33 kg/m²   Weight: 187 lb (84.8 kg)   Constitutional: She is oriented to person, place, and time. She appears well-developed and well-nourished. No distress. HENT:   Head: Normocephalic and atraumatic. Mouth/Throat: Uvula is midline, oropharynx is clear and moist and mucous membranes are normal.   Eyes: Conjunctivae and EOM are normal. Pupils are equal, round, and reactive to light. Neck: Trachea normal and normal range of motion. Neck supple. No JVD present. Carotid bruit is not present. No mass and no thyromegaly present. Cardiovascular: Normal rate, regular rhythm, normal heart sounds and intact distal pulses. Exam reveals no gallop and no friction rub. No murmur heard. Pulmonary/Chest: Effort normal and breath sounds normal. No respiratory distress. She has no wheezes. She has no rales. Abdominal: Soft. Normal aorta and bowel sounds are normal. She exhibits no distension and no mass. There is no hepatosplenomegaly. No tenderness. Musculoskeletal: She exhibits no edema and no tenderness. Neurological: She is alert and oriented to person, place, and time. She has normal strength.  No cranial nerve deficit or sensory deficit. Coordination and gait normal.   Skin: Skin is warm and dry. No rash noted. No erythema. EKG Interpretation:  NSR, WNL    Lab Review: ordered labs today       Assessment:       Sidney Zaragoza was seen today for pre-op exam.    Diagnoses and all orders for this visit:    Preop examination  -     CBC Auto Differential  -     EKG 12 Lead    Type 2 diabetes mellitus without complication, without long-term current use of insulin (HCC)  -     Comprehensive Metabolic Panel  -     Microalbumin / Creatinine Urine Ratio  -     Hemoglobin A1C    Malignant neoplasm of upper-outer quadrant of right breast in female, estrogen receptor negative (HCC)    Essential hypertension  -     Comprehensive Metabolic Panel    Pure hypercholesterolemia  -     LIPID PANEL; Future            Plan:     1. Preoperative workup as follows: ECG, hemoglobin, hematocrit, electrolytes, creatinine, glucose  2. Change in medication regimen before surgery: Hold all medications on morning of surgery, stop multivitamin 7 days prior to surgery. 3. No contraindications to planned surgery  4. Will clear for surgery when I get labs back. Note electronically signed by provider.

## 2019-04-30 ENCOUNTER — HOSPITAL ENCOUNTER (OUTPATIENT)
Age: 64
Discharge: HOME | End: 2019-04-30
Payer: COMMERCIAL

## 2019-04-30 DIAGNOSIS — E11.9 TYPE 2 DIABETES MELLITUS WITHOUT COMPLICATION, WITHOUT LONG-TERM CURRENT USE OF INSULIN (HCC): ICD-10-CM

## 2019-04-30 DIAGNOSIS — E78.00 PURE HYPERCHOLESTEROLEMIA: ICD-10-CM

## 2019-04-30 DIAGNOSIS — Z01.818 PREOP EXAMINATION: ICD-10-CM

## 2019-04-30 LAB
A/G RATIO: 1.3 (ref 1.1–2.2)
ABO/RH: NORMAL
ALBUMIN SERPL-MCNC: 4.2 G/DL (ref 3.4–5)
ALP BLD-CCNC: 81 U/L (ref 40–129)
ALT SERPL-CCNC: 19 U/L (ref 10–40)
ANION GAP SERPL CALCULATED.3IONS-SCNC: 14 MMOL/L (ref 3–16)
ANTIBODY SCREEN: NORMAL
AST SERPL-CCNC: 19 U/L (ref 15–37)
BASOPHILS ABSOLUTE: 0 K/UL (ref 0–0.2)
BASOPHILS RELATIVE PERCENT: 0.8 %
BILIRUB SERPL-MCNC: 0.4 MG/DL (ref 0–1)
BUN BLDV-MCNC: 18 MG/DL (ref 7–20)
CALCIUM SERPL-MCNC: 9.9 MG/DL (ref 8.3–10.6)
CHLORIDE BLD-SCNC: 101 MMOL/L (ref 99–110)
CHOLESTEROL, TOTAL: 146 MG/DL (ref 0–199)
CO2: 28 MMOL/L (ref 21–32)
CREAT SERPL-MCNC: 0.9 MG/DL (ref 0.6–1.2)
CREATININE URINE: 77.6 MG/DL (ref 28–259)
EOSINOPHILS ABSOLUTE: 0.2 K/UL (ref 0–0.6)
EOSINOPHILS RELATIVE PERCENT: 3.7 %
ESTIMATED AVERAGE GLUCOSE: 119.8 MG/DL
GFR AFRICAN AMERICAN: >60
GFR NON-AFRICAN AMERICAN: >60
GLOBULIN: 3.2 G/DL
GLUCOSE BLD-MCNC: 132 MG/DL (ref 70–99)
HBA1C MFR BLD: 5.8 %
HCT VFR BLD CALC: 30.9 % (ref 36–48)
HDLC SERPL-MCNC: 47 MG/DL (ref 40–60)
HEMOGLOBIN: 10.8 G/DL (ref 12–16)
INR BLD: 1 (ref 0.86–1.14)
LDL CHOLESTEROL CALCULATED: 67 MG/DL
LYMPHOCYTES ABSOLUTE: 1.3 K/UL (ref 1–5.1)
LYMPHOCYTES RELATIVE PERCENT: 22.5 %
MCH RBC QN AUTO: 33.3 PG (ref 26–34)
MCHC RBC AUTO-ENTMCNC: 34.9 G/DL (ref 31–36)
MCV RBC AUTO: 95.4 FL (ref 80–100)
MICROALBUMIN UR-MCNC: <1.2 MG/DL
MICROALBUMIN/CREAT UR-RTO: NORMAL MG/G (ref 0–30)
MONOCYTES ABSOLUTE: 0.5 K/UL (ref 0–1.3)
MONOCYTES RELATIVE PERCENT: 8.2 %
NEUTROPHILS ABSOLUTE: 3.7 K/UL (ref 1.7–7.7)
NEUTROPHILS RELATIVE PERCENT: 64.8 %
PDW BLD-RTO: 12.5 % (ref 12.4–15.4)
PLATELET # BLD: 213 K/UL (ref 135–450)
PMV BLD AUTO: 8.2 FL (ref 5–10.5)
POTASSIUM SERPL-SCNC: 4.3 MMOL/L (ref 3.5–5.1)
PROTHROMBIN TIME: 11.4 SEC (ref 9.8–13)
RBC # BLD: 3.24 M/UL (ref 4–5.2)
SODIUM BLD-SCNC: 143 MMOL/L (ref 136–145)
TOTAL PROTEIN: 7.4 G/DL (ref 6.4–8.2)
TRIGL SERPL-MCNC: 162 MG/DL (ref 0–150)
VLDLC SERPL CALC-MCNC: 32 MG/DL
WBC # BLD: 5.7 K/UL (ref 4–11)

## 2019-04-30 PROCEDURE — 82570 ASSAY OF URINE CREATININE: CPT

## 2019-04-30 PROCEDURE — 86901 BLOOD TYPING SEROLOGIC RH(D): CPT

## 2019-04-30 PROCEDURE — 86900 BLOOD TYPING SEROLOGIC ABO: CPT

## 2019-04-30 PROCEDURE — 36415 COLL VENOUS BLD VENIPUNCTURE: CPT

## 2019-04-30 PROCEDURE — 86850 RBC ANTIBODY SCREEN: CPT

## 2019-04-30 PROCEDURE — 80061 LIPID PANEL: CPT

## 2019-04-30 PROCEDURE — 85610 PROTHROMBIN TIME: CPT

## 2019-04-30 PROCEDURE — 80053 COMPREHEN METABOLIC PANEL: CPT

## 2019-04-30 PROCEDURE — 82043 UR ALBUMIN QUANTITATIVE: CPT

## 2019-04-30 PROCEDURE — 85025 COMPLETE CBC W/AUTO DIFF WBC: CPT

## 2019-04-30 PROCEDURE — 83036 HEMOGLOBIN GLYCOSYLATED A1C: CPT

## 2019-05-02 NOTE — PROGRESS NOTES
Name_______________________________________Printed:____________________  Date and time of surgery__5/7/19  0930______________________Arrival Time:__0630  masc______________   1. Do not eat or drink anything after 12 midnight (or____hours) prior to surgery. This includes no water, chewing gum or mints. Endoscopy patients follow your doctors bowel prep instructions,which may include taking part of prep after midnight. 2. Take the following pills with a small sip of water on the morning of surgery__omeprazole, ultram_________________________________________________                  Do not take blood pressure medications ending in pril or sartan the sydnie prior to surgery or the morning of surgery_   3. Aspirin, Ibuprofen, Advil, Naproxen, Vitamin E and other Anti-inflammatory products should be stopped for 5 days before surgery or as directed by your physician. 4. Check with your Doctor regarding stopping Plavix, Coumadin,Eliquis, Lovenox,Effient,Pradaxa,Xarelto, Fragmin or other blood thinners and follow their instructions. 5. Do not smoke, and do not drink any alcoholic beverages 24 hours prior to surgery. This includes NA Beer. Refrain from the usage of any recreational drugs. 6. You may brush your teeth and gargle the morning of surgery. DO NOT SWALLOW WATER   7. You MUST make arrangements for a responsible adult to stay on site while you are here and take you home after your surgery. You will not be allowed to leave alone or drive yourself home. It is strongly suggested someone stay with you the first 24 hrs. Your surgery will be cancelled if you do not have a ride home. 8. A parent/legal guardian must accompany a child scheduled for surgery and plan to stay at the hospital until the child is discharged. Please do not bring other children with you.    9. Please wear simple, loose fitting clothing to the hospital.  Do not bring valuables (money, credit cards, checkbooks, etc.) Do not wear any makeup (including no eye makeup) or nail polish on your fingers or toes. 10. DO NOT wear any jewelry or piercings on day of surgery. All body piercing jewelry must be removed. 11. If you have _x__dentures, they will be removed before going to the OR; we will provide you a container. If you wear ___contact lenses or _x__glasses, they will be removed; please bring a case for them. 12. Please see your family doctor/pediatrician for a history & physical and/or concerning medications. Bring any test results/reports from your physician's office. PCP__________________Phone___________H&P Appt. Date________             13 If you  have a Living Will and Durable Power of  for Healthcare, please bring in a copy. 15. Notify your Surgeon if you develop any illness between now and surgery  time, cough, cold, fever, sore throat, nausea, vomiting, etc.  Please notify your surgeon if you experience dizziness, shortness of breath or blurred vision between now & the time of your surgery             15. DO NOT shave your operative site 96 hours prior to surgery. For face & neck surgery, men may use an electric razor 48 hours prior to surgery. 16. Shower the night before surgery with _x__Antibacterial soap ___Hibiclens             17. To provide excellent care visitors will be limited to one in the room at any given time. 18.  Please bring picture ID and insurance card. 19.  Visit our web site for additional information:  [email or web address]              20.During flu season no children under the age of 15 are permitted in the hospital for the safety of all patients.                               21. If you take a long acting insulin in the evening only  take half of your usual  dose the night  before your procedure              22. If you use a c-pap please bring DOS if staying overnight,             23.For your convenience [facility] has a pharmacy on site to fill your prescriptions. 24. If you use oxygen and have a portable tank please bring it  with you the DOS             25. Bring a complete list of all your medications with name and dose include any supplements. 26. Other__________________________________________   *Please call pre admission testing if you any further questions   Karissa Farley     [id number]  [id number]   [address] [place] [address]       All above information reviewed with patient in person or by phone. Patient verbalizes understanding. All questions and concerns addressed.                                                                                                  Patient/Rep_patient___________________                                                                                                                                    PRE OP INSTRUCTIONS

## 2019-05-06 ENCOUNTER — TELEPHONE (OUTPATIENT)
Dept: FAMILY MEDICINE CLINIC | Age: 64
End: 2019-05-06

## 2019-05-06 NOTE — TELEPHONE ENCOUNTER
Patient scheduled for surgery tomorrow. I did labs recently and H/H are down from November, she is having a mastectomy done, are you ok for me to clear her for surgery? Couldn't really see a reason for those to be down.

## 2019-05-06 NOTE — TELEPHONE ENCOUNTER
Deirdre Metz from pre-admission at [facility] states patient has surgery tomorrow and her preop paperwork states patient will be clear for surgery after receiving labs work back, she wants to know if patient is okay for surgery since it is scheduled for tomorrow.

## 2019-05-07 ENCOUNTER — HOSPITAL ENCOUNTER (OUTPATIENT)
Age: 64
Discharge: HOME | End: 2019-05-08
Attending: SURGERY | Admitting: SURGERY
Payer: COMMERCIAL

## 2019-05-07 ENCOUNTER — ANESTHESIA (OUTPATIENT)
Dept: OPERATING ROOM | Age: 64
End: 2019-05-07
Payer: COMMERCIAL

## 2019-05-07 ENCOUNTER — APPOINTMENT (OUTPATIENT)
Dept: WOMENS IMAGING | Age: 64
End: 2019-05-07
Attending: SURGERY
Payer: COMMERCIAL

## 2019-05-07 ENCOUNTER — ANESTHESIA EVENT (OUTPATIENT)
Dept: OPERATING ROOM | Age: 64
End: 2019-05-07
Payer: COMMERCIAL

## 2019-05-07 ENCOUNTER — HOSPITAL ENCOUNTER (OUTPATIENT)
Dept: ULTRASOUND IMAGING | Age: 64
Discharge: HOME | End: 2019-05-07
Payer: COMMERCIAL

## 2019-05-07 VITALS
SYSTOLIC BLOOD PRESSURE: 115 MMHG | OXYGEN SATURATION: 99 % | DIASTOLIC BLOOD PRESSURE: 68 MMHG | TEMPERATURE: 98.6 F | RESPIRATION RATE: 24 BRPM

## 2019-05-07 DIAGNOSIS — C50.911 MALIGNANT NEOPLASM OF RIGHT FEMALE BREAST, UNSPECIFIED ESTROGEN RECEPTOR STATUS, UNSPECIFIED SITE OF BREAST (HCC): ICD-10-CM

## 2019-05-07 DIAGNOSIS — G89.18 POSTOPERATIVE PAIN: Primary | ICD-10-CM

## 2019-05-07 DIAGNOSIS — R92.8 ABNORMAL MAMMOGRAM: ICD-10-CM

## 2019-05-07 LAB
ABO/RH: NORMAL
ANTIBODY SCREEN: NORMAL
GLUCOSE BLD-MCNC: 132 MG/DL (ref 70–99)
GLUCOSE BLD-MCNC: 176 MG/DL (ref 70–99)
GLUCOSE BLD-MCNC: 178 MG/DL (ref 70–99)
GLUCOSE BLD-MCNC: 192 MG/DL (ref 70–99)
GLUCOSE BLD-MCNC: 199 MG/DL (ref 70–99)
PERFORMED ON: ABNORMAL

## 2019-05-07 PROCEDURE — 19302 P-MASTECTOMY W/LN REMOVAL: CPT | Performed by: SURGERY

## 2019-05-07 PROCEDURE — 6370000000 HC RX 637 (ALT 250 FOR IP): Performed by: SURGERY

## 2019-05-07 PROCEDURE — 3700000000 HC ANESTHESIA ATTENDED CARE: Performed by: SURGERY

## 2019-05-07 PROCEDURE — 2580000003 HC RX 258: Performed by: ANESTHESIOLOGY

## 2019-05-07 PROCEDURE — 6360000002 HC RX W HCPCS: Performed by: NURSE ANESTHETIST, CERTIFIED REGISTERED

## 2019-05-07 PROCEDURE — 86901 BLOOD TYPING SEROLOGIC RH(D): CPT

## 2019-05-07 PROCEDURE — 2580000003 HC RX 258: Performed by: NURSE ANESTHETIST, CERTIFIED REGISTERED

## 2019-05-07 PROCEDURE — 2500000003 HC RX 250 WO HCPCS: Performed by: SURGERY

## 2019-05-07 PROCEDURE — 3600000012 HC SURGERY LEVEL 2 ADDTL 15MIN: Performed by: SURGERY

## 2019-05-07 PROCEDURE — 6360000002 HC RX W HCPCS: Performed by: SURGERY

## 2019-05-07 PROCEDURE — 7100000001 HC PACU RECOVERY - ADDTL 15 MIN: Performed by: SURGERY

## 2019-05-07 PROCEDURE — 86850 RBC ANTIBODY SCREEN: CPT

## 2019-05-07 PROCEDURE — 88307 TISSUE EXAM BY PATHOLOGIST: CPT

## 2019-05-07 PROCEDURE — 7100000000 HC PACU RECOVERY - FIRST 15 MIN: Performed by: SURGERY

## 2019-05-07 PROCEDURE — 2709999900 HC NON-CHARGEABLE SUPPLY: Performed by: SURGERY

## 2019-05-07 PROCEDURE — 6360000002 HC RX W HCPCS: Performed by: ANESTHESIOLOGY

## 2019-05-07 PROCEDURE — 76098 X-RAY EXAM SURGICAL SPECIMEN: CPT

## 2019-05-07 PROCEDURE — 2580000003 HC RX 258: Performed by: SURGERY

## 2019-05-07 PROCEDURE — A4648 IMPLANTABLE TISSUE MARKER: HCPCS | Performed by: SURGERY

## 2019-05-07 PROCEDURE — 2500000003 HC RX 250 WO HCPCS: Performed by: NURSE ANESTHETIST, CERTIFIED REGISTERED

## 2019-05-07 PROCEDURE — 19294 PREPJ TUM CAV IORT PRTL MAST: CPT | Performed by: SURGERY

## 2019-05-07 PROCEDURE — 3600000014 HC SURGERY LEVEL 4 ADDTL 15MIN: Performed by: SURGERY

## 2019-05-07 PROCEDURE — 3600000004 HC SURGERY LEVEL 4 BASE: Performed by: SURGERY

## 2019-05-07 PROCEDURE — 3600000002 HC SURGERY LEVEL 2 BASE: Performed by: SURGERY

## 2019-05-07 PROCEDURE — 88305 TISSUE EXAM BY PATHOLOGIST: CPT

## 2019-05-07 PROCEDURE — 19286 PERQ DEV BREAST ADD US IMAG: CPT

## 2019-05-07 PROCEDURE — 19285 PERQ DEV BREAST 1ST US IMAG: CPT

## 2019-05-07 PROCEDURE — 86900 BLOOD TYPING SEROLOGIC ABO: CPT

## 2019-05-07 PROCEDURE — 3700000001 HC ADD 15 MINUTES (ANESTHESIA): Performed by: SURGERY

## 2019-05-07 PROCEDURE — 77065 DX MAMMO INCL CAD UNI: CPT

## 2019-05-07 PROCEDURE — 14301 TIS TRNFR ANY 30.1-60 SQ CM: CPT | Performed by: SURGERY

## 2019-05-07 DEVICE — THE MARKER IS A RADIOGRAPHIC IMPLANTABLE MARKER USED TO MARK SOFT TISSUE.IT IS COMPRISED OF A BIOABSORBABLE SPACER THAT HOLDS RADIOPAQUE MARKER CLIPS.
Type: IMPLANTABLE DEVICE | Site: BREAST | Status: FUNCTIONAL
Brand: BIOZORB MARKER

## 2019-05-07 RX ORDER — NICOTINE POLACRILEX 4 MG
15 LOZENGE BUCCAL PRN
Status: DISCONTINUED | OUTPATIENT
Start: 2019-05-07 | End: 2019-05-08 | Stop reason: HOSPADM

## 2019-05-07 RX ORDER — DEXAMETHASONE SODIUM PHOSPHATE 4 MG/ML
INJECTION, SOLUTION INTRA-ARTICULAR; INTRALESIONAL; INTRAMUSCULAR; INTRAVENOUS; SOFT TISSUE PRN
Status: DISCONTINUED | OUTPATIENT
Start: 2019-05-07 | End: 2019-05-07 | Stop reason: SDUPTHER

## 2019-05-07 RX ORDER — HYDROMORPHONE HCL 110MG/55ML
0.25 PATIENT CONTROLLED ANALGESIA SYRINGE INTRAVENOUS EVERY 5 MIN PRN
Status: DISCONTINUED | OUTPATIENT
Start: 2019-05-07 | End: 2019-05-07

## 2019-05-07 RX ORDER — HYDROCODONE BITARTRATE AND ACETAMINOPHEN 5; 325 MG/1; MG/1
1 TABLET ORAL
Status: DISCONTINUED | OUTPATIENT
Start: 2019-05-07 | End: 2019-05-07

## 2019-05-07 RX ORDER — DEXTROSE MONOHYDRATE 50 MG/ML
100 INJECTION, SOLUTION INTRAVENOUS PRN
Status: DISCONTINUED | OUTPATIENT
Start: 2019-05-07 | End: 2019-05-08 | Stop reason: HOSPADM

## 2019-05-07 RX ORDER — SODIUM CHLORIDE, SODIUM LACTATE, POTASSIUM CHLORIDE, CALCIUM CHLORIDE 600; 310; 30; 20 MG/100ML; MG/100ML; MG/100ML; MG/100ML
INJECTION, SOLUTION INTRAVENOUS CONTINUOUS PRN
Status: DISCONTINUED | OUTPATIENT
Start: 2019-05-07 | End: 2019-05-07 | Stop reason: SDUPTHER

## 2019-05-07 RX ORDER — CITALOPRAM 20 MG/1
10 TABLET ORAL DAILY
Status: DISCONTINUED | OUTPATIENT
Start: 2019-05-07 | End: 2019-05-08 | Stop reason: HOSPADM

## 2019-05-07 RX ORDER — SODIUM CHLORIDE 9 MG/ML
INJECTION, SOLUTION INTRAVENOUS CONTINUOUS
Status: DISCONTINUED | OUTPATIENT
Start: 2019-05-07 | End: 2019-05-08 | Stop reason: HOSPADM

## 2019-05-07 RX ORDER — LOSARTAN POTASSIUM 25 MG/1
50 TABLET ORAL DAILY
Status: DISCONTINUED | OUTPATIENT
Start: 2019-05-07 | End: 2019-05-08 | Stop reason: HOSPADM

## 2019-05-07 RX ORDER — DEXTROSE MONOHYDRATE 25 G/50ML
12.5 INJECTION, SOLUTION INTRAVENOUS PRN
Status: DISCONTINUED | OUTPATIENT
Start: 2019-05-07 | End: 2019-05-08 | Stop reason: HOSPADM

## 2019-05-07 RX ORDER — ONDANSETRON 2 MG/ML
4 INJECTION INTRAMUSCULAR; INTRAVENOUS
Status: DISCONTINUED | OUTPATIENT
Start: 2019-05-07 | End: 2019-05-07

## 2019-05-07 RX ORDER — OXYBUTYNIN CHLORIDE 5 MG/1
5 TABLET ORAL 2 TIMES DAILY
Status: DISCONTINUED | OUTPATIENT
Start: 2019-05-07 | End: 2019-05-08 | Stop reason: HOSPADM

## 2019-05-07 RX ORDER — SUCCINYLCHOLINE CHLORIDE 20 MG/ML
INJECTION INTRAMUSCULAR; INTRAVENOUS PRN
Status: DISCONTINUED | OUTPATIENT
Start: 2019-05-07 | End: 2019-05-07 | Stop reason: SDUPTHER

## 2019-05-07 RX ORDER — LIDOCAINE HYDROCHLORIDE 10 MG/ML
1 INJECTION, SOLUTION EPIDURAL; INFILTRATION; INTRACAUDAL; PERINEURAL
Status: DISCONTINUED | OUTPATIENT
Start: 2019-05-07 | End: 2019-05-07

## 2019-05-07 RX ORDER — ONDANSETRON 2 MG/ML
INJECTION INTRAMUSCULAR; INTRAVENOUS PRN
Status: DISCONTINUED | OUTPATIENT
Start: 2019-05-07 | End: 2019-05-07 | Stop reason: SDUPTHER

## 2019-05-07 RX ORDER — HYDROCODONE BITARTRATE AND ACETAMINOPHEN 5; 325 MG/1; MG/1
1 TABLET ORAL EVERY 4 HOURS PRN
Status: DISCONTINUED | OUTPATIENT
Start: 2019-05-07 | End: 2019-05-08 | Stop reason: HOSPADM

## 2019-05-07 RX ORDER — SODIUM CHLORIDE, SODIUM LACTATE, POTASSIUM CHLORIDE, CALCIUM CHLORIDE 600; 310; 30; 20 MG/100ML; MG/100ML; MG/100ML; MG/100ML
INJECTION, SOLUTION INTRAVENOUS CONTINUOUS
Status: DISCONTINUED | OUTPATIENT
Start: 2019-05-07 | End: 2019-05-07

## 2019-05-07 RX ORDER — CEFAZOLIN SODIUM 2 G/100ML
2 INJECTION, SOLUTION INTRAVENOUS EVERY 8 HOURS
Status: COMPLETED | OUTPATIENT
Start: 2019-05-07 | End: 2019-05-08

## 2019-05-07 RX ORDER — HYDROCODONE BITARTRATE AND ACETAMINOPHEN 5; 325 MG/1; MG/1
2 TABLET ORAL EVERY 4 HOURS PRN
Status: DISCONTINUED | OUTPATIENT
Start: 2019-05-07 | End: 2019-05-08 | Stop reason: HOSPADM

## 2019-05-07 RX ORDER — ROCURONIUM BROMIDE 10 MG/ML
INJECTION, SOLUTION INTRAVENOUS PRN
Status: DISCONTINUED | OUTPATIENT
Start: 2019-05-07 | End: 2019-05-07 | Stop reason: SDUPTHER

## 2019-05-07 RX ORDER — LIDOCAINE HYDROCHLORIDE 10 MG/ML
0.1 INJECTION, SOLUTION EPIDURAL; INFILTRATION; INTRACAUDAL; PERINEURAL
Status: ACTIVE | OUTPATIENT
Start: 2019-05-07 | End: 2019-05-07

## 2019-05-07 RX ORDER — PROPOFOL 10 MG/ML
INJECTION, EMULSION INTRAVENOUS PRN
Status: DISCONTINUED | OUTPATIENT
Start: 2019-05-07 | End: 2019-05-07 | Stop reason: SDUPTHER

## 2019-05-07 RX ORDER — SODIUM CHLORIDE 0.9 % (FLUSH) 0.9 %
10 SYRINGE (ML) INJECTION PRN
Status: DISCONTINUED | OUTPATIENT
Start: 2019-05-07 | End: 2019-05-08 | Stop reason: HOSPADM

## 2019-05-07 RX ORDER — ONDANSETRON 2 MG/ML
4 INJECTION INTRAMUSCULAR; INTRAVENOUS EVERY 6 HOURS PRN
Status: DISCONTINUED | OUTPATIENT
Start: 2019-05-07 | End: 2019-05-08 | Stop reason: HOSPADM

## 2019-05-07 RX ORDER — SODIUM CHLORIDE 0.9 % (FLUSH) 0.9 %
10 SYRINGE (ML) INJECTION EVERY 12 HOURS SCHEDULED
Status: DISCONTINUED | OUTPATIENT
Start: 2019-05-07 | End: 2019-05-07

## 2019-05-07 RX ORDER — MAGNESIUM HYDROXIDE 1200 MG/15ML
LIQUID ORAL CONTINUOUS PRN
Status: COMPLETED | OUTPATIENT
Start: 2019-05-07 | End: 2019-05-07

## 2019-05-07 RX ORDER — SODIUM CHLORIDE 0.9 % (FLUSH) 0.9 %
10 SYRINGE (ML) INJECTION EVERY 12 HOURS SCHEDULED
Status: DISCONTINUED | OUTPATIENT
Start: 2019-05-07 | End: 2019-05-08 | Stop reason: HOSPADM

## 2019-05-07 RX ORDER — HYDROCODONE BITARTRATE AND ACETAMINOPHEN 5; 325 MG/1; MG/1
1 TABLET ORAL EVERY 4 HOURS PRN
Qty: 42 TABLET | Refills: 0 | Status: SHIPPED | OUTPATIENT
Start: 2019-05-07 | End: 2019-05-14

## 2019-05-07 RX ORDER — SODIUM CHLORIDE 0.9 % (FLUSH) 0.9 %
10 SYRINGE (ML) INJECTION PRN
Status: DISCONTINUED | OUTPATIENT
Start: 2019-05-07 | End: 2019-05-07

## 2019-05-07 RX ORDER — MORPHINE SULFATE 2 MG/ML
2 INJECTION, SOLUTION INTRAMUSCULAR; INTRAVENOUS
Status: DISCONTINUED | OUTPATIENT
Start: 2019-05-07 | End: 2019-05-08 | Stop reason: HOSPADM

## 2019-05-07 RX ORDER — FENTANYL CITRATE 50 UG/ML
INJECTION, SOLUTION INTRAMUSCULAR; INTRAVENOUS PRN
Status: DISCONTINUED | OUTPATIENT
Start: 2019-05-07 | End: 2019-05-07 | Stop reason: SDUPTHER

## 2019-05-07 RX ORDER — PRAVASTATIN SODIUM 20 MG
20 TABLET ORAL DAILY
Status: DISCONTINUED | OUTPATIENT
Start: 2019-05-07 | End: 2019-05-08 | Stop reason: HOSPADM

## 2019-05-07 RX ORDER — CEFAZOLIN SODIUM 2 G/100ML
2 INJECTION, SOLUTION INTRAVENOUS
Status: COMPLETED | OUTPATIENT
Start: 2019-05-07 | End: 2019-05-07

## 2019-05-07 RX ORDER — HYDROMORPHONE HCL 110MG/55ML
0.5 PATIENT CONTROLLED ANALGESIA SYRINGE INTRAVENOUS EVERY 5 MIN PRN
Status: COMPLETED | OUTPATIENT
Start: 2019-05-07 | End: 2019-05-07

## 2019-05-07 RX ORDER — LIDOCAINE HYDROCHLORIDE 20 MG/ML
INJECTION, SOLUTION EPIDURAL; INFILTRATION; INTRACAUDAL; PERINEURAL PRN
Status: DISCONTINUED | OUTPATIENT
Start: 2019-05-07 | End: 2019-05-07 | Stop reason: SDUPTHER

## 2019-05-07 RX ORDER — LIDOCAINE HYDROCHLORIDE 10 MG/ML
10 INJECTION, SOLUTION EPIDURAL; INFILTRATION; INTRACAUDAL; PERINEURAL ONCE
Status: COMPLETED | OUTPATIENT
Start: 2019-05-07 | End: 2019-05-07

## 2019-05-07 RX ORDER — CLINDAMYCIN PHOSPHATE 900 MG/50ML
900 INJECTION INTRAVENOUS
Status: DISCONTINUED | OUTPATIENT
Start: 2019-05-07 | End: 2019-05-07

## 2019-05-07 RX ORDER — CETIRIZINE HYDROCHLORIDE 10 MG/1
10 TABLET ORAL DAILY
Status: DISCONTINUED | OUTPATIENT
Start: 2019-05-07 | End: 2019-05-08 | Stop reason: HOSPADM

## 2019-05-07 RX ORDER — BUPIVACAINE HYDROCHLORIDE AND EPINEPHRINE 2.5; 5 MG/ML; UG/ML
INJECTION, SOLUTION EPIDURAL; INFILTRATION; INTRACAUDAL; PERINEURAL
Status: COMPLETED | OUTPATIENT
Start: 2019-05-07 | End: 2019-05-07

## 2019-05-07 RX ORDER — SODIUM CHLORIDE 9 MG/ML
INJECTION, SOLUTION INTRAVENOUS CONTINUOUS
Status: DISCONTINUED | OUTPATIENT
Start: 2019-05-07 | End: 2019-05-07

## 2019-05-07 RX ORDER — MORPHINE SULFATE 4 MG/ML
4 INJECTION, SOLUTION INTRAMUSCULAR; INTRAVENOUS
Status: DISCONTINUED | OUTPATIENT
Start: 2019-05-07 | End: 2019-05-08 | Stop reason: HOSPADM

## 2019-05-07 RX ADMIN — HYDROCODONE BITARTRATE AND ACETAMINOPHEN 1 TABLET: 5; 325 TABLET ORAL at 21:42

## 2019-05-07 RX ADMIN — ONDANSETRON 4 MG: 2 INJECTION INTRAMUSCULAR; INTRAVENOUS at 12:53

## 2019-05-07 RX ADMIN — CITALOPRAM HYDROBROMIDE 10 MG: 20 TABLET ORAL at 17:23

## 2019-05-07 RX ADMIN — PHENYLEPHRINE HYDROCHLORIDE 100 MCG: 10 INJECTION INTRAVENOUS at 10:28

## 2019-05-07 RX ADMIN — HYDROMORPHONE HYDROCHLORIDE 0.5 MG: 2 INJECTION, SOLUTION INTRAMUSCULAR; INTRAVENOUS; SUBCUTANEOUS at 14:53

## 2019-05-07 RX ADMIN — SODIUM CHLORIDE, POTASSIUM CHLORIDE, SODIUM LACTATE AND CALCIUM CHLORIDE: 600; 310; 30; 20 INJECTION, SOLUTION INTRAVENOUS at 11:19

## 2019-05-07 RX ADMIN — FENTANYL CITRATE 50 MCG: 50 INJECTION, SOLUTION INTRAMUSCULAR; INTRAVENOUS at 11:58

## 2019-05-07 RX ADMIN — PROPOFOL 20 MG: 10 INJECTION, EMULSION INTRAVENOUS at 10:38

## 2019-05-07 RX ADMIN — PHENYLEPHRINE HYDROCHLORIDE 100 MCG: 10 INJECTION INTRAVENOUS at 10:50

## 2019-05-07 RX ADMIN — PROPOFOL 160 MG: 10 INJECTION, EMULSION INTRAVENOUS at 09:59

## 2019-05-07 RX ADMIN — FENTANYL CITRATE 25 MCG: 50 INJECTION, SOLUTION INTRAMUSCULAR; INTRAVENOUS at 13:45

## 2019-05-07 RX ADMIN — PROPOFOL 20 MG: 10 INJECTION, EMULSION INTRAVENOUS at 10:10

## 2019-05-07 RX ADMIN — Medication 10 ML: at 22:01

## 2019-05-07 RX ADMIN — OXYBUTYNIN CHLORIDE 5 MG: 5 TABLET ORAL at 21:42

## 2019-05-07 RX ADMIN — FENTANYL CITRATE 100 MCG: 50 INJECTION, SOLUTION INTRAMUSCULAR; INTRAVENOUS at 09:57

## 2019-05-07 RX ADMIN — ROCURONIUM BROMIDE 5 MG: 10 INJECTION, SOLUTION INTRAVENOUS at 09:59

## 2019-05-07 RX ADMIN — SUCCINYLCHOLINE CHLORIDE 120 MG: 20 INJECTION, SOLUTION INTRAMUSCULAR; INTRAVENOUS at 09:59

## 2019-05-07 RX ADMIN — CEFAZOLIN SODIUM 2 G: 2 INJECTION, SOLUTION INTRAVENOUS at 17:25

## 2019-05-07 RX ADMIN — PHENYLEPHRINE HYDROCHLORIDE 100 MCG: 10 INJECTION INTRAVENOUS at 10:23

## 2019-05-07 RX ADMIN — PHENYLEPHRINE HYDROCHLORIDE 100 MCG: 10 INJECTION INTRAVENOUS at 11:01

## 2019-05-07 RX ADMIN — INSULIN LISPRO 1 UNITS: 100 INJECTION, SOLUTION INTRAVENOUS; SUBCUTANEOUS at 21:46

## 2019-05-07 RX ADMIN — HYDROMORPHONE HYDROCHLORIDE 0.5 MG: 2 INJECTION, SOLUTION INTRAMUSCULAR; INTRAVENOUS; SUBCUTANEOUS at 14:20

## 2019-05-07 RX ADMIN — HYDROMORPHONE HYDROCHLORIDE 0.5 MG: 2 INJECTION, SOLUTION INTRAMUSCULAR; INTRAVENOUS; SUBCUTANEOUS at 14:43

## 2019-05-07 RX ADMIN — PRAVASTATIN SODIUM 20 MG: 20 TABLET ORAL at 17:23

## 2019-05-07 RX ADMIN — CETIRIZINE HYDROCHLORIDE 10 MG: 10 TABLET, FILM COATED ORAL at 17:22

## 2019-05-07 RX ADMIN — PHENYLEPHRINE HYDROCHLORIDE 100 MCG: 10 INJECTION INTRAVENOUS at 10:15

## 2019-05-07 RX ADMIN — DEXAMETHASONE SODIUM PHOSPHATE 4 MG: 4 INJECTION, SOLUTION INTRAMUSCULAR; INTRAVENOUS at 10:13

## 2019-05-07 RX ADMIN — PHENYLEPHRINE HYDROCHLORIDE 100 MCG: 10 INJECTION INTRAVENOUS at 12:02

## 2019-05-07 RX ADMIN — LIDOCAINE HYDROCHLORIDE 60 MG: 20 INJECTION, SOLUTION EPIDURAL; INFILTRATION; INTRACAUDAL; PERINEURAL at 09:59

## 2019-05-07 RX ADMIN — PHENYLEPHRINE HYDROCHLORIDE 100 MCG: 10 INJECTION INTRAVENOUS at 11:12

## 2019-05-07 RX ADMIN — CEFAZOLIN SODIUM 2 G: 2 INJECTION, SOLUTION INTRAVENOUS at 09:52

## 2019-05-07 RX ADMIN — SODIUM CHLORIDE: 9 INJECTION, SOLUTION INTRAVENOUS at 07:20

## 2019-05-07 RX ADMIN — FENTANYL CITRATE 50 MCG: 50 INJECTION, SOLUTION INTRAMUSCULAR; INTRAVENOUS at 10:40

## 2019-05-07 RX ADMIN — LIDOCAINE HYDROCHLORIDE 10 ML: 10 INJECTION, SOLUTION EPIDURAL; INFILTRATION; INTRACAUDAL; PERINEURAL at 08:20

## 2019-05-07 RX ADMIN — FENTANYL CITRATE 25 MCG: 50 INJECTION, SOLUTION INTRAMUSCULAR; INTRAVENOUS at 13:42

## 2019-05-07 RX ADMIN — PHENYLEPHRINE HYDROCHLORIDE 100 MCG: 10 INJECTION INTRAVENOUS at 10:32

## 2019-05-07 RX ADMIN — SODIUM CHLORIDE: 9 INJECTION, SOLUTION INTRAVENOUS at 14:24

## 2019-05-07 RX ADMIN — MORPHINE SULFATE 2 MG: 2 INJECTION, SOLUTION INTRAMUSCULAR; INTRAVENOUS at 17:23

## 2019-05-07 RX ADMIN — HYDROMORPHONE HYDROCHLORIDE 0.5 MG: 2 INJECTION, SOLUTION INTRAMUSCULAR; INTRAVENOUS; SUBCUTANEOUS at 15:03

## 2019-05-07 ASSESSMENT — PULMONARY FUNCTION TESTS
PIF_VALUE: 23
PIF_VALUE: 32
PIF_VALUE: 20
PIF_VALUE: 23
PIF_VALUE: 30
PIF_VALUE: 23
PIF_VALUE: 20
PIF_VALUE: 23
PIF_VALUE: 27
PIF_VALUE: 23
PIF_VALUE: 31
PIF_VALUE: 26
PIF_VALUE: 21
PIF_VALUE: 23
PIF_VALUE: 26
PIF_VALUE: 28
PIF_VALUE: 27
PIF_VALUE: 23
PIF_VALUE: 30
PIF_VALUE: 24
PIF_VALUE: 23
PIF_VALUE: 23
PIF_VALUE: 19
PIF_VALUE: 20
PIF_VALUE: 0
PIF_VALUE: 31
PIF_VALUE: 26
PIF_VALUE: 31
PIF_VALUE: 19
PIF_VALUE: 32
PIF_VALUE: 20
PIF_VALUE: 1
PIF_VALUE: 21
PIF_VALUE: 23
PIF_VALUE: 31
PIF_VALUE: 32
PIF_VALUE: 30
PIF_VALUE: 27
PIF_VALUE: 23
PIF_VALUE: 30
PIF_VALUE: 23
PIF_VALUE: 27
PIF_VALUE: 1
PIF_VALUE: 23
PIF_VALUE: 23
PIF_VALUE: 30
PIF_VALUE: 28
PIF_VALUE: 30
PIF_VALUE: 23
PIF_VALUE: 23
PIF_VALUE: 33
PIF_VALUE: 23
PIF_VALUE: 23
PIF_VALUE: 33
PIF_VALUE: 30
PIF_VALUE: 26
PIF_VALUE: 23
PIF_VALUE: 30
PIF_VALUE: 31
PIF_VALUE: 23
PIF_VALUE: 30
PIF_VALUE: 21
PIF_VALUE: 23
PIF_VALUE: 21
PIF_VALUE: 27
PIF_VALUE: 23
PIF_VALUE: 27
PIF_VALUE: 21
PIF_VALUE: 23
PIF_VALUE: 23
PIF_VALUE: 26
PIF_VALUE: 27
PIF_VALUE: 24
PIF_VALUE: 23
PIF_VALUE: 21
PIF_VALUE: 37
PIF_VALUE: 27
PIF_VALUE: 23
PIF_VALUE: 32
PIF_VALUE: 30
PIF_VALUE: 28
PIF_VALUE: 23
PIF_VALUE: 29
PIF_VALUE: 23
PIF_VALUE: 1
PIF_VALUE: 23
PIF_VALUE: 23
PIF_VALUE: 32
PIF_VALUE: 21
PIF_VALUE: 23
PIF_VALUE: 21
PIF_VALUE: 23
PIF_VALUE: 29
PIF_VALUE: 23
PIF_VALUE: 35
PIF_VALUE: 28
PIF_VALUE: 26
PIF_VALUE: 23
PIF_VALUE: 28
PIF_VALUE: 30
PIF_VALUE: 23
PIF_VALUE: 26
PIF_VALUE: 30
PIF_VALUE: 27
PIF_VALUE: 31
PIF_VALUE: 30
PIF_VALUE: 23
PIF_VALUE: 23
PIF_VALUE: 27
PIF_VALUE: 23
PIF_VALUE: 27
PIF_VALUE: 23
PIF_VALUE: 26
PIF_VALUE: 23
PIF_VALUE: 26
PIF_VALUE: 23
PIF_VALUE: 30
PIF_VALUE: 25
PIF_VALUE: 24
PIF_VALUE: 23
PIF_VALUE: 27
PIF_VALUE: 1
PIF_VALUE: 19
PIF_VALUE: 23
PIF_VALUE: 1
PIF_VALUE: 27
PIF_VALUE: 26
PIF_VALUE: 23
PIF_VALUE: 26
PIF_VALUE: 23
PIF_VALUE: 35
PIF_VALUE: 27
PIF_VALUE: 23
PIF_VALUE: 30
PIF_VALUE: 23
PIF_VALUE: 23
PIF_VALUE: 21
PIF_VALUE: 23
PIF_VALUE: 26
PIF_VALUE: 19
PIF_VALUE: 0
PIF_VALUE: 23
PIF_VALUE: 27
PIF_VALUE: 27
PIF_VALUE: 23
PIF_VALUE: 23
PIF_VALUE: 0
PIF_VALUE: 27
PIF_VALUE: 23
PIF_VALUE: 27
PIF_VALUE: 23
PIF_VALUE: 21
PIF_VALUE: 21
PIF_VALUE: 30
PIF_VALUE: 23
PIF_VALUE: 30
PIF_VALUE: 32
PIF_VALUE: 23
PIF_VALUE: 23
PIF_VALUE: 27
PIF_VALUE: 23
PIF_VALUE: 26
PIF_VALUE: 26
PIF_VALUE: 31
PIF_VALUE: 31
PIF_VALUE: 23
PIF_VALUE: 27
PIF_VALUE: 0
PIF_VALUE: 23
PIF_VALUE: 33
PIF_VALUE: 1
PIF_VALUE: 23
PIF_VALUE: 26
PIF_VALUE: 30
PIF_VALUE: 27
PIF_VALUE: 23
PIF_VALUE: 27
PIF_VALUE: 23
PIF_VALUE: 30
PIF_VALUE: 23
PIF_VALUE: 31
PIF_VALUE: 25
PIF_VALUE: 42
PIF_VALUE: 31
PIF_VALUE: 23
PIF_VALUE: 23
PIF_VALUE: 31
PIF_VALUE: 23
PIF_VALUE: 30
PIF_VALUE: 23
PIF_VALUE: 29
PIF_VALUE: 11
PIF_VALUE: 19
PIF_VALUE: 31
PIF_VALUE: 26
PIF_VALUE: 31
PIF_VALUE: 30
PIF_VALUE: 23
PIF_VALUE: 23
PIF_VALUE: 30
PIF_VALUE: 23
PIF_VALUE: 29
PIF_VALUE: 20
PIF_VALUE: 23
PIF_VALUE: 1
PIF_VALUE: 23
PIF_VALUE: 23
PIF_VALUE: 30
PIF_VALUE: 23
PIF_VALUE: 23
PIF_VALUE: 0
PIF_VALUE: 15

## 2019-05-07 ASSESSMENT — PAIN DESCRIPTION - LOCATION: LOCATION: BREAST

## 2019-05-07 ASSESSMENT — PAIN DESCRIPTION - ONSET: ONSET: ON-GOING

## 2019-05-07 ASSESSMENT — PAIN DESCRIPTION - PROGRESSION
CLINICAL_PROGRESSION: GRADUALLY IMPROVING
CLINICAL_PROGRESSION: GRADUALLY IMPROVING

## 2019-05-07 ASSESSMENT — PAIN SCALES - GENERAL
PAINLEVEL_OUTOF10: 4
PAINLEVEL_OUTOF10: 10
PAINLEVEL_OUTOF10: 7
PAINLEVEL_OUTOF10: 4
PAINLEVEL_OUTOF10: 10
PAINLEVEL_OUTOF10: 4
PAINLEVEL_OUTOF10: 10
PAINLEVEL_OUTOF10: 4

## 2019-05-07 ASSESSMENT — PAIN DESCRIPTION - DESCRIPTORS: DESCRIPTORS: SORE

## 2019-05-07 ASSESSMENT — PAIN DESCRIPTION - PAIN TYPE: TYPE: SURGICAL PAIN

## 2019-05-07 ASSESSMENT — PAIN DESCRIPTION - ORIENTATION: ORIENTATION: RIGHT

## 2019-05-07 ASSESSMENT — PAIN DESCRIPTION - FREQUENCY: FREQUENCY: CONTINUOUS

## 2019-05-07 ASSESSMENT — PAIN - FUNCTIONAL ASSESSMENT: PAIN_FUNCTIONAL_ASSESSMENT: 0-10

## 2019-05-07 NOTE — PROGRESS NOTES
Pt reports pain gradually improving post administration of IV dilaudid. Tolerating ice chips. Family at bedside. Pt has met criteria to be discharged from PACU phase 1, will prepare for transfer to floor at this time.      Electronically signed by TRUE King, RN, CAPTRAM on 5/7/19 at 3:10 PM

## 2019-05-07 NOTE — PROGRESS NOTES
Arrived to unit in stable condition. Vitals stable. Son at bedside. Bed alarm engaged. No further needs expressed. Call light within reach. Will continue to monitor.

## 2019-05-07 NOTE — PROGRESS NOTES
SENTIMAG UNIT USED INTRA-OP TO OPSITE PER MD WITH STERILE DRAPED PROBE. SERIAL NUMBER FOR UNIT [id number]. VENDOR IS KESHA BLANK PRESENT FOR CASE.

## 2019-05-07 NOTE — ANESTHESIA PRE PROCEDURE
Department of Anesthesiology  Preprocedure Note       Name:  Marielena Vazquez   Age:  66 y.o.  :  1955                                          MRN:  [id number]         Date:  2019      Surgeon: Brittany Hodge):  Phuc Galindo MD    Procedure: RIGHT LOCALIZED PARTIAL BREAST MASTECTOMY,  RIGHT AXILLARY LYMPH NODE DISSECTION, BIOZORB (Right Breast)    Medications prior to admission:   Prior to Admission medications    Medication Sig Start Date End Date Taking? Authorizing Provider   traMADol (ULTRAM) 50 MG tablet Take 1 tablet by mouth every 8 hours as needed for Pain for up to 30 days.  19 Yes Alessandra Hung MD   citalopram (CELEXA) 10 MG tablet TAKE ONE TABLET BY MOUTH DAILY 3/6/19  Yes Alessandra Hung MD   diclofenac 1.5 % SOLN APPLY 2 SQUIRTS TO THE AFFECTED AREA TWO TIMES A DAY 19  Yes Alessandra Hung MD   loratadine (CLARITIN) 10 MG tablet Take 10 mg by mouth daily   Yes Historical Provider, MD   metFORMIN (GLUCOPHAGE-XR) 500 MG extended release tablet TAKE ONE TABLET BY MOUTH THREE TIMES A DAY WITH MEALS 18  Yes Louann Burton MD   omeprazole (PRILOSEC) 40 MG delayed release capsule One daily 10/17/18  Yes Louann Burton MD   oxybutynin (DITROPAN) 5 MG tablet Take 1 tablet by mouth 2 times daily 10/17/18  Yes Louann Burton MD   pravastatin (PRAVACHOL) 20 MG tablet TAKE 1 TABLET DAILY 10/17/18  Yes Louann Burton MD   Calcium Carbonate-Vit D-Min (CALCIUM 1200 PO) Take by mouth   Yes Historical Provider, MD   Cholecalciferol (VITAMIN D3) 2000 units CAPS Take by mouth   Yes Historical Provider, MD   vitamin C (ASCORBIC ACID) 500 MG tablet Take 500 mg by mouth daily   Yes Historical Provider, MD   losartan (COZAAR) 50 MG tablet Take 1 tablet by mouth daily 6/15/18  Yes Louann Burton MD   triamcinolone (KENALOG) 0.1 % ointment Apply topically 2 times daily 6/15/18  Yes Louann Burton MD   lidocaine-prilocaine (EMLA) 2.5-2.5 % cream  11/9/18   Historical Provider, MD   Glucose Blood ([employer] TEST VI) 1 Device by In Vitro route. Historical Provider, MD   glucose blood VI test strips ([employer] TEST STRIPS) strip 1 each by In Vitro route daily. As needed. Historical Provider, MD       Current medications:    Current Facility-Administered Medications   Medication Dose Route Frequency Provider Last Rate Last Dose    ceFAZolin (ANCEF) 2 g in dextrose 4 % 100 mL IVPB (premix)  2 g Intravenous On Call to [address] [place] [address] Street, MD        clindamycin (CLEOCIN) 900 mg in dextrose 5 % 50 mL IVPB  900 mg Intravenous On Call to [address] [place] [address], MD        lactated ringers infusion   Intravenous Continuous Nilda Harvey MD        sodium chloride flush 0.9 % injection 10 mL  10 mL Intravenous 2 times per day Nilda Harvey MD        sodium chloride flush 0.9 % injection 10 mL  10 mL Intravenous PRN Nilda Harvey MD        lidocaine PF 1 % injection 0.1 mL  0.1 mL Intradermal Once PRN Nilda Harvey MD        0.9 % sodium chloride infusion   Intravenous Continuous Mj Mendez  mL/hr at 05/07/19 0720      lidocaine PF 1 % injection 1 mL  1 mL Intradermal Once PRN Mj Mendez MD        HYDROcodone-acetaminophen (NORCO) 5-325 MG per tablet 1 tablet  1 tablet Oral Once PRN Mj Mendez MD        ondansetron [facility]) injection 4 mg  4 mg Intravenous Once PRN Mj Mendez MD        HYDROmorphone (DILAUDID) injection 0.25 mg  0.25 mg Intravenous Q5 Min PRN Mj Mendez MD        HYDROmorphone (DILAUDID) injection 0.5 mg  0.5 mg Intravenous Q5 Min PRN Mj Mendez MD           Allergies:     Allergies   Allergen Reactions    Advil Pm [Ibuprofen-Diphenhydramine Cit] Shortness Of Breath     All NSAID    Aspirin Shortness Of Breath    Ibuprofen Shortness Of Breath    Zithromax [Azithromycin Dihydrate] Nausea And Vomiting and Other (See Comments)     Stomach

## 2019-05-07 NOTE — H&P
H&P Update    The patient's most recent H&P, office notes, breast imaging, and pathology were reviewed. Patient examined and laterality marked. There has been no changes. We will plan to proceed with a right partial mastectomy and axillary lymph node dissection.     Nicole Valerio

## 2019-05-07 NOTE — OP NOTE
Postoperative Note    Savannah Winston  YOB: 1955  [id number]    Pre-operative Diagnosis: T 2 N1 right breast cancer    Post-operative Diagnosis: Same    Procedure: right magseed directed partial mastectomy,   right tissue rearrangement procedure for area of 35 sq. cm. right axillary lymph node dissection Immediate insertion of a breast prosthesis: BioZorb    Anesthesia: General    Surgeons/Assistants: Kan Schwartz  Assistant: John Lovell    Estimated Blood Loss: less than 50     Drains:  1 x 15F lorelei in axilla    Complications: None apparant    Specimens: right breast tissue right axillary contents (see below for details)    Findings: specimen radiograph shows capture of lesion in question    Post-Op Condition: Stable    Disposition: to recovery room    Description of Procedure:   Ms. Hannah Soto is a 63 y.o. woman with a clinical T2 N1 right breast cancer. She has underwent chemotherapy. Her anxiety lymph node is a bit smaller, but unfortunately still abnormal appearing. This was discussed personally with the radiologist on site. She has elected to proceed with right breast conservation therapy and right axillary lymph node dissection. The indications for the planned procedure, along with the potential benefits and risks which include but are not limited to the risk of anesthesia, bleeding, infection, possible failed operation, possible need for additional surgery pending final pathologic assessment, lymphedema, sensation changes, nerve injury and unappealing cosmetics were reviewed. All questions were answered and she agrees to proceed. Ms. Hannah Soto was met by me in the preoperative area. The surgical site was identified. Consent was obtained. She underwent an image guided localization procedure preoperatively which was performed by the on site radiologist. The right breast lesion and abnormal axillary lymph node was again noted with the biopsy clips.  A Magseed was placed in both. She was brought to the operating room and placed supine with her arms extended on boards. She was appropriately positioned and padded. Compression stockings were placed. Appropriate antibiotics were administered within 60 minutes of the incision. After induction of general anesthesia, the appropriate World Health Organization timeout procedure was performed. The right breast and axillary region, arm, and chest wall were prepped and draped in the normal sterile fashion. There were 2 seeds placed by the mammographer marking the right breast cancer and node. The skin and soft tissues over the proposed incision were infiltrated with local. An axillary incision was made as it was determined the breast lesion could be accessed from the dissection incision. Flaps were raised and dissection was carried out in a rectangular fashion around the seed. No skin was removed. Dissection was carried to the chest wall. Dissection was carried out carefully to try and maintain the localization seed centrally within the specimen. The specimen was excised and gross examination revealed a close deep margin initially so an additional margin including tissue which extended to the chest wall was obtained. A stitch was placed in the new margin. At this point all gross margins appeared adequate. The primary specimen was oriented with a margin map. It was submitted for specimen radiography which revealed the lesion in question with radiographic margins appearing close on the deep margin. The remaining margins appeared adequate. As noted above, a deep margin was taken and dissection was carried to the chest wall. No additional margins were obtained The wound was irrigated and hemostasis was obtained. The cavity was marked. In order to obtain the best cosmesis while closing the surgical cavity, I performed a tissue rearrangement. A separate tissue incision was made superiorly and inferiorly.   Flaps were elevated and advanced for a total area of 35 square cm. Once tissue mobilization was complete, the dimensions of the cavity were assessed. A three-dimensional BioZorb implant was then placed in the prior tumor bed to facilitate potential radiation treatment and future follow-up surveillance. The implant was then seated and sutured into place at multiple points of contact with a 3-0 Vicryl stitch. The previously mobilized tissue was then reapproximated to enclose the implant and re-create the natural breast cosmesis. Hemostasis was reassessed. Attention was turned then to the right axilla. The skin and soft tissues over the proposed axillary incision was infiltrated with local. An incision was made just beneath the axillary hairline. Dissection was carried through the subcutaneous tissues and hemostasis obtained. A flap was created medially and carried out to the lateral border of pectoralis major and minor. A flap was created superiorly and carried out the level of the axillary vein. The axillary vein was identified. A peanut dissector was used to suite tissue inferiorly from the axillary vein. The long thoracic nerve was identified along the chest wall lying medially to the thoracodorsal neurovascular bundle. Both long thoracic and thoracodorsal nerve were preserved. The intercostal brachial nerves were sacrificed. A lateral flap was raised and dissected to the level of latissimus dorsi. Axillary tissue was continued to be cleared inferiorly to the level of the sixth intercostal space. There was no significant abnormal lymphadenopathy outside of the originally biopsied lymph node. The surgical specimen was oriented with a stitch marking the apex of the axilla. The specimen was labeled levels I/II axillary contents and submitted to pathology. The axilla was then assessed for hemostasis. Cautery, surgical clips, and hemostatic agents were utilized as necessary.  Small lymphatics and vascular structures were identified and

## 2019-05-07 NOTE — PROGRESS NOTES
Patient assisted to bsc and back to bed. Morphine given for pain. Scheduled medications administered. Pt refused morphine and losartan at this time. Patient given popsicle, jello, and sprite per request. Explained diet to patient. No nausea at this time. VAISHNAVI drain milked, very little output. Surgical incision is c,d,i. No signs of hematoma formation. Pain well controlled per patient. Bed alarm engaged. No further needs expressed. Call light within reach. Will continue to monitor.

## 2019-05-08 VITALS
DIASTOLIC BLOOD PRESSURE: 72 MMHG | RESPIRATION RATE: 16 BRPM | SYSTOLIC BLOOD PRESSURE: 104 MMHG | HEART RATE: 88 BPM | TEMPERATURE: 98.6 F | BODY MASS INDEX: 34.93 KG/M2 | HEIGHT: 61 IN | OXYGEN SATURATION: 94 % | WEIGHT: 185 LBS

## 2019-05-08 LAB
GLUCOSE BLD-MCNC: 159 MG/DL (ref 70–99)
GLUCOSE BLD-MCNC: 184 MG/DL (ref 70–99)
PERFORMED ON: ABNORMAL
PERFORMED ON: ABNORMAL

## 2019-05-08 PROCEDURE — 6360000002 HC RX W HCPCS: Performed by: SURGERY

## 2019-05-08 PROCEDURE — 6370000000 HC RX 637 (ALT 250 FOR IP): Performed by: SURGERY

## 2019-05-08 PROCEDURE — 97165 OT EVAL LOW COMPLEX 30 MIN: CPT

## 2019-05-08 PROCEDURE — 97161 PT EVAL LOW COMPLEX 20 MIN: CPT

## 2019-05-08 PROCEDURE — 97535 SELF CARE MNGMENT TRAINING: CPT

## 2019-05-08 PROCEDURE — 2580000003 HC RX 258: Performed by: SURGERY

## 2019-05-08 RX ADMIN — INSULIN LISPRO 2 UNITS: 100 INJECTION, SOLUTION INTRAVENOUS; SUBCUTANEOUS at 08:39

## 2019-05-08 RX ADMIN — HYDROCODONE BITARTRATE AND ACETAMINOPHEN 1 TABLET: 5; 325 TABLET ORAL at 10:23

## 2019-05-08 RX ADMIN — CETIRIZINE HYDROCHLORIDE 10 MG: 10 TABLET, FILM COATED ORAL at 08:38

## 2019-05-08 RX ADMIN — CEFAZOLIN SODIUM 2 G: 2 INJECTION, SOLUTION INTRAVENOUS at 03:01

## 2019-05-08 RX ADMIN — CITALOPRAM HYDROBROMIDE 10 MG: 20 TABLET ORAL at 08:38

## 2019-05-08 RX ADMIN — HYDROCODONE BITARTRATE AND ACETAMINOPHEN 1 TABLET: 5; 325 TABLET ORAL at 06:20

## 2019-05-08 RX ADMIN — INSULIN LISPRO 2 UNITS: 100 INJECTION, SOLUTION INTRAVENOUS; SUBCUTANEOUS at 12:09

## 2019-05-08 RX ADMIN — PRAVASTATIN SODIUM 20 MG: 20 TABLET ORAL at 08:38

## 2019-05-08 RX ADMIN — OXYBUTYNIN CHLORIDE 5 MG: 5 TABLET ORAL at 08:38

## 2019-05-08 RX ADMIN — Medication 10 ML: at 08:39

## 2019-05-08 RX ADMIN — LOSARTAN POTASSIUM 50 MG: 25 TABLET ORAL at 08:38

## 2019-05-08 RX ADMIN — SODIUM CHLORIDE: 9 INJECTION, SOLUTION INTRAVENOUS at 02:56

## 2019-05-08 ASSESSMENT — PAIN SCALES - GENERAL
PAINLEVEL_OUTOF10: 3
PAINLEVEL_OUTOF10: 4
PAINLEVEL_OUTOF10: 4
PAINLEVEL_OUTOF10: 7
PAINLEVEL_OUTOF10: 4
PAINLEVEL_OUTOF10: 3

## 2019-05-08 ASSESSMENT — PAIN DESCRIPTION - LOCATION
LOCATION: BREAST

## 2019-05-08 ASSESSMENT — PAIN DESCRIPTION - PROGRESSION
CLINICAL_PROGRESSION: GRADUALLY IMPROVING

## 2019-05-08 ASSESSMENT — PAIN DESCRIPTION - ORIENTATION
ORIENTATION: RIGHT

## 2019-05-08 ASSESSMENT — PAIN DESCRIPTION - FREQUENCY
FREQUENCY: CONTINUOUS
FREQUENCY: CONTINUOUS

## 2019-05-08 ASSESSMENT — PAIN DESCRIPTION - PAIN TYPE
TYPE: SURGICAL PAIN

## 2019-05-08 ASSESSMENT — PAIN DESCRIPTION - ONSET
ONSET: ON-GOING
ONSET: ON-GOING

## 2019-05-08 ASSESSMENT — PAIN DESCRIPTION - DESCRIPTORS
DESCRIPTORS: SORE
DESCRIPTORS: SORE

## 2019-05-08 NOTE — PROGRESS NOTES
Occupational Therapy   Occupational Therapy Initial Assessment  Date: 2019   Patient Name: Shagufta Parker  MRN: [id number]     : 1955    Date of Service: 2019    Discharge Recommendations:  Shagufta Parker scored a 18/24 on the AM-PAC ADL Inpatient form. Current research shows that an AM-PAC score of 18 or greater is typically associated with a discharge to the patient's home setting. Based on the patients AM-PAC score and their current ADL deficits, it is recommended that the patient have 2-3 sessions per week of Occupational Therapy at d/c to increase the patients independence. HOME HEALTH CARE: LEVEL 1 STANDARD    - Initial home health evaluation to occur within 24-48 hours, in patient home   - Therapy to evaluate with goal of regaining prior level of functioning   - Therapy to evaluate if patient has [address] needs for personal care         OT Equipment Recommendations  Equipment Needed: No  Other: discussed possible use of shower chair with pt    Assessment   Performance deficits / Impairments: Decreased functional mobility ; Decreased ADL status; Decreased high-level IADLs;Decreased balance  Assessment: pt not at baseline and would benefit from skilled OT services at this time  Treatment Diagnosis: pt presents with decreased functional mobility, ADL/IADL status, and balance sescondary to RIGHT LOCALIZED PARTIAL BREAST MASTECTOMY, RIGHT AXILLARY LYMPH NODE DISSECTION  Prognosis: Good  Decision Making: Low Complexity  History: pt lives at home with son but son works during the day. pt will have family with her upon d/c. pt is independent with ADL/IADL tasks at baseline  Assistance / Modification: SBA to supervision functional mobility, supervision bed mobility, ADL tasks with supervision/SBA  Patient Education: OT eval, POC, discharge, lymphedema education, precautions, neck exercises, gentle ROM, extensive education provided to pt this date. pt agreeable to education.  all pt's questions answered   REQUIRES OT FOLLOW UP: Yes  Activity Tolerance  Activity Tolerance: Patient Tolerated treatment well  Activity Tolerance: limited by surgical pain  Safety Devices  Safety Devices in place: Yes  Type of devices: All fall risk precautions in place; Patient at risk for falls;Call light within reach;Nurse notified; Chair alarm in place; Left in chair;Gait belt           Patient Diagnosis(es): The primary encounter diagnosis was Postoperative pain. Diagnoses of Abnormal mammogram and Malignant neoplasm of right female breast, unspecified estrogen receptor status, unspecified site of breast [facility]) were also pertinent to this visit. has a past medical history of Anxiety and depression, Arthritis, Cancer ([facility] [address] Diabetes mellitus ([facility] [address] GERD (gastroesophageal reflux disease), Hyperlipidemia, Hypertension, Overactive bladder, Sleep apnea, Type II or unspecified type diabetes mellitus without mention of complication, not stated as uncontrolled, and Wears glasses. has a past surgical history that includes  section; Hysterectomy (); Breast surgery (Right, ); Abdomen surgery; Finger trigger release (Right, 2017); Ulnar tunnel release (Right, 2017); Carpal tunnel release (Right, 2017); Carpal tunnel release (Left, 2017); Colonoscopy; Upper gastrointestinal endoscopy; Tonsillectomy; Knee arthroscopy (Right, 2017); and Mastectomy, modified radical (Right, 2019).     Treatment Diagnosis: pt presents with decreased functional mobility, ADL/IADL status, and balance sescondary to RIGHT LOCALIZED PARTIAL BREAST MASTECTOMY, RIGHT AXILLARY LYMPH NODE DISSECTION      Restrictions  Restrictions/Precautions  Restrictions/Precautions: Fall Risk(high fall risk)  Position Activity Restriction  Other position/activity restrictions: RIGHT LOCALIZED PARTIAL BREAST MASTECTOMY,  RIGHT AXILLARY LYMPH NODE DISSECTION    Subjective   General  Chart Reviewed: Yes  Patient assessed for rehabilitation services?: Yes  Family / Caregiver Present: Yes(son present)  Diagnosis: RIGHT LOCALIZED PARTIAL BREAST MASTECTOMY, RIGHT AXILLARY LYMPH NODE DISSECTION  Subjective  Subjective: pt seated EOB upon arrival and agreeable to OT eval   Pain Assessment  Pain Assessment: 0-10  Pain Level: 4  Pain Type: Surgical pain  Pain Location: Breast  Pain Orientation: Right (given ice, due for pain meds at 10:20am)  Oxygen Therapy  SpO2: 97 %  O2 Device: Nasal cannula  O2 Flow Rate (L/min): 2 L/min  Social/Functional History  Social/Functional History  Lives With: (brother stays with pt. son is planning to be available when discharged. son works during the day)  Type of Home: House(pt lives upstairs)  Home Layout: One level, Laundry in basement  Home Access: Stairs to enter with rails(12 [address] front 13 in back )  Entrance Stairs - Number of Steps: back has railing on both, R side in front. pt usually uses back steps  Entrance Stairs - Rails: Right  Bathroom Shower/Tub: Tub/Shower unit(nonslip mat)  Bathroom Toilet: Standard  Bathroom Equipment: Grab bars in shower  Home Equipment: Cane, Standard walker  ADL Assistance: Independent  Homemaking Assistance: Independent  Ambulation Assistance: Independent  Transfer Assistance: Independent  Active : Yes  Leisure & Hobbies: tv, reading Bible  Additional Comments: pt reports one fall in the last 6 months.  pt reports she tripped over her R foot and fell She thought she broke her nose and glasses but did not       Objective   Vision: Impaired  Vision Exceptions: Wears glasses at all times  Hearing: Within functional limits    Orientation  Overall Orientation Status: Within Normal Limits  Observation/Palpation  Posture: Fair(rounded shoulders, guarding)  Balance  Sitting Balance: Supervision  Standing Balance: Stand by assistance(CGA to SBA)  Standing Balance  Time: ~12-15 minutes  Activity: functional mobility to/from bathroom with RW, stance at sink for grooming/bathing  Comment: pt on RA, saturations above 90% throughout session. pt left on RA at 96%, RN aware  Functional Mobility  Functional - Mobility Device: No device  Activity: To/from bathroom; Other  Assist Level: Stand by assistance  Toilet Transfers  Toilet - Technique: Ambulating  Equipment Used: (grab bar used)  Toilet Transfer: Supervision  Toilet Transfers Comments: grab bar used  ADL  Grooming: Supervision(in stance at sink, oral care)  UE Bathing: Supervision(in stance at sink, pt did not remove surgical bra for bathing, educated on showering once VAISHNAVI drain is removed)  LE Bathing: Supervision(seated on toliet)  UE Dressing: Supervision;Setup(donning gown)  LE Dressing: Supervision(underwear)  Toileting: Supervision(pt urinated and performed alethea care seated)  Additional Comments: pt walked to/from bathroom with SBA. pt slow and small DORENE. pt reaching for door and sink as she walked. pt stood at sink with unilateral support for bathing.  pt complaining of surgical pain   Tone RUE  RUE Tone: Normotonic  Tone LUE  LUE Tone: Normotonic  Coordination  Movements Are Fluid And Coordinated: Yes     Bed mobility  Supine to Sit: Supervision  Scooting: Supervision  Comment: HOB raised  Transfers  Sit to stand: Stand by assistance  Stand to sit: Supervision  Transfer Comments: EOB>toliet>recliner     Cognition  Overall Cognitive Status: WNL  Perception  Overall Perceptual Status: WFL     Sensation  Overall Sensation Status: WNL        LUE AROM (degrees)  LUE AROM : (did not test secondary to sx)  LUE Strength  Gross LUE Strength: (did not assess secondary to sx)                   Plan   Plan  Times per week: 1-2  Current Treatment Recommendations: Safety Education & Training, Balance Training, Patient/Caregiver Education & Training, Self-Care / ADL, Functional Mobility Training, Home Management Training    G-Code     OutComes Score                                                  AM-PAC Score        AM-PAC Inpatient Daily Activity Raw Score: 18  AM-PAC Inpatient ADL T-Scale Score : 38.66  ADL Inpatient CMS 0-100% Score: 46.65  ADL Inpatient CMS G-Code Modifier : CK    Goals  Short term goals  Time Frame for Short term goals: discharge  Short term goal 1: bed mobility mod I  Short term goal 2: functional ADL transfer mod I  Short term goal 3: functional mobility with RW mod I  Short term goal 4: UB/LB ADLs mod I  Short term goal 5: simple IADL task mod I  Patient Goals   Patient goals : return home, get well       Therapy Time   Individual Concurrent Group Co-treatment   Time In 0903         Time Out 0941         Minutes 38              Timed Code Treatment Minutes:   23    Total Treatment Minutes:  Barbara [address] OTR/L [place] OT

## 2019-05-08 NOTE — PROGRESS NOTES
VSS; respirations even, easy, and unlabored. Shift assessment complete, pt A&Ox4. Lungs CTA. Surgical incision to R breast is c,d,i with well approximated edges. Surgical bra and fluff padding in place. Pt reports that her pain is managed with current pain medications. VAISHNAVI drain in place. Milked tubing and drain is to bulb suction. Patient declined sitting in chair, but is sitting on the edge of the bed and was set up for breakfast. Son at bedside. All morning medications administered per orders. No further needs expressed. Call light within reach. Will continue to monitor. Bed alarm engaged.

## 2019-05-08 NOTE — PROGRESS NOTES
Bedside report completed with Franklin Rosen RN. Call light within reach, bed alarm engaged. Son asleep at bedside.

## 2019-05-08 NOTE — PROGRESS NOTES
Patient has been discharged per MD orders. Patient verbalizes understanding of all instructions, medications, and follow up. IV has been removed, catheter intact, patent tolerated well. All belongings have been gathered and packed. Family in route to transport patient from hospital. Transport has been notified of discharge. Patient given supplies to care for VAISHNAVI drain at home until her post op appointment. Prescription for Norco given to patient with discharge paperwork. All questions answered. Patient and son deny any further questions. Patient leaving in stable condition.

## 2019-05-08 NOTE — PROGRESS NOTES
Pt ambulated around the room with stand by assist. Pt ambulated to bathroom and around bed. Pt tolerated ambulation well with the exception of some pain at the surgical site. PRN Norco given for pain. Milked VAISHNAVI drain, drained 40 ml of dark brown Fluid.

## 2019-05-08 NOTE — PROGRESS NOTES
Scheduled humalog administered per orders. Patient back in bed. VAISHNAVI drain emptied, 20 mL emptied. Drain milked. Noted VAISHNAVI draining into bulb, line is patent. Pain controlled. Bed alarm engaged. No further needs expressed. Call light within reach. Will continue to monitor.

## 2019-05-08 NOTE — PROGRESS NOTES
Physical Therapy    Facility/Department: [address] ONCOLOGY  Initial Assessment    NAME: Dagoberto Rajput  : 1955  MRN: [id number]    Date of Service: 2019    Discharge Recommendations: Dagoberto Rajput scored a 22/24 on the AM-PAC short mobility form. Current research shows that an AM-PAC score of 18 or greater is typically associated with a discharge to the patient's home setting. Based on the patients AM-PAC score and their current functional mobility deficits, it is recommended that the patient have 2-3 sessions per week of Physical Therapy at d/c to increase the patients independence. HOME HEALTH CARE: LEVEL 1 STANDARD    - Initial home health evaluation to occur within 24-48 hours, in patient home   - Therapy to evaluate with goal of regaining prior level of functioning   - Therapy to evaluate if patient has [address] needs for personal care      PT Equipment Recommendations  Equipment Needed: No    Assessment   Body structures, Functions, Activity limitations: Decreased balance;Decreased endurance;Decreased functional mobility   Assessment: pt. presents with above deficits, and will benefit from skilled PT to improve overall function and safely return to OF. Treatment Diagnosis: decreased endurance   Prognosis: Good  Decision Making: Low Complexity  Exam: functional mobility, balance, AMPAC   Clinical Presentation: stable   Patient Education: reason for PT eval, discharge planning  Barriers to Learning: none  REQUIRES PT FOLLOW UP: Yes  Activity Tolerance  Activity Tolerance: Patient Tolerated treatment well;Patient limited by fatigue  Activity Tolerance: pt. felt fatigued from ambulation, rested and continued        Patient Diagnosis(es): The primary encounter diagnosis was Postoperative pain.  Diagnoses of Abnormal mammogram and Malignant neoplasm of right female breast, unspecified estrogen receptor status, unspecified site of breast [facility]) were also pertinent to this visit.     has a past medical history of Anxiety and depression, Arthritis, Cancer ([facility] [address] Diabetes mellitus ([facility] [address] GERD (gastroesophageal reflux disease), Hyperlipidemia, Hypertension, Overactive bladder, Sleep apnea, Type II or unspecified type diabetes mellitus without mention of complication, not stated as uncontrolled, and Wears glasses. has a past surgical history that includes  section; Hysterectomy (); Breast surgery (Right, ); Abdomen surgery; Finger trigger release (Right, 2017); Ulnar tunnel release (Right, 2017); Carpal tunnel release (Right, 2017); Carpal tunnel release (Left, 2017); Colonoscopy; Upper gastrointestinal endoscopy; Tonsillectomy; Knee arthroscopy (Right, 2017); and Mastectomy, modified radical (Right, 2019).     Restrictions  Restrictions/Precautions  Restrictions/Precautions: Fall Risk(high)  Position Activity Restriction  Other position/activity restrictions: RIGHT LOCALIZED PARTIAL BREAST MASTECTOMY,  RIGHT AXILLARY LYMPH NODE DISSECTION  Vision/Hearing        Subjective  General  Chart Reviewed: Yes  Patient assessed for rehabilitation services?: Yes  Response To Previous Treatment: Not applicable  Family / Caregiver Present: Yes(son)  General Comment  Comments: pt. was in recliner upon arrival   Subjective  Subjective: pt. was agreeable to PT on this date   Pain Screening  Patient Currently in Pain: No(pain has started to subside )  Pain Assessment  Pain Assessment: 0-10  Pain Type: Surgical pain  Pain Location: Breast  Pain Orientation: Right  Pain Descriptors: Sore  Pain Frequency: Continuous  Pain Onset: On-going  Clinical Progression: Gradually improving  Vital Signs  Patient Currently in Pain: No(pain has started to subside )       Orientation  Orientation  Overall Orientation Status: Within Normal Limits  Social/Functional History  Social/Functional History  Lives With: (brother stays with pt. son is planning to be available when discharged. son works during the day)  Type of Home: House(pt lives upstairs)  Home Layout: One level, Laundry in basement  Home Access: Stairs to enter with rails(12 [address] front 13 in back )  Entrance Stairs - Number of Steps: back has railing on both, R side in front. pt usually uses back steps  Entrance Stairs - Rails: Right  Bathroom Shower/Tub: Tub/Shower unit(nonslip mat)  Bathroom Toilet: Standard  Bathroom Equipment: Grab bars in shower  Home Equipment: Cane, Standard walker  ADL Assistance: Independent  Homemaking Assistance: Independent  Ambulation Assistance: Independent  Transfer Assistance: Independent  Active : Yes  Leisure & Hobbies: tv, reading Bible  Additional Comments: pt reports one fall in the last 6 months. pt reports she tripped over her R foot and fell She thought she broke her nose and glasses but did not       Objective     Observation/Palpation  Posture: Fair(forward flexed posture )    AROM RLE (degrees)  RLE AROM: WFL  AROM LLE (degrees)  LLE AROM : WFL  Strength RLE  Strength RLE: WFL  Comment: pt. had no trouble with transfers or ambulation   Strength LLE  Strength LLE: WFL  Comment: pt. had no trouble with transfers or ambulation       Sensation  Overall Sensation Status: WNL  Bed mobility  Comment: bed mobility not observed, pt. was in recliner at beginning of session and returned to recliner after   Transfers  Sit to Stand: Stand by assistance  Stand to sit: Stand by assistance  Ambulation  Ambulation?: Yes  WB Status: full WB   More Ambulation?: No  Ambulation 1  Surface: level tile  Device: No Device  Assistance: Contact guard assistance;Stand by assistance(CGA progressing to SBA )  Quality of Gait: forward flexed posture, slower magnus below baseline, decreased step length   Distance: 150ft   Comments: pt. reports feeling fatigued half-way through, took a rest break and continued.  pt. states it feels good to be up and moving   Stairs/Curb  Stairs?: Yes  Stairs  # Steps :

## 2019-05-08 NOTE — PLAN OF CARE
Problem: Falls - Risk of:  Goal: Will remain free from falls  Description  Will remain free from falls  Outcome: Ongoing  Patient will remain free of falls during this shift. Bed alarm engaged. Bed will remain in lowest, locked position. Patient wearing non-skid footwear. Fall bracelet on patient and fall blanket tied on end of bed. Patient encouraged to call nurse when needing to ambulate. Problem: Pain:  Goal: Pain level will decrease  Description  Pain level will decrease  Goal: Control of acute pain  Description  Control of acute pain  5/8/2019 1204 by Betsey Dumont RN  Outcome: Ongoing  Patient's pain will decrease this shift. Patient verbalizes understanding on how to notify nursing staff when pain arises. Patient also verbalizes understanding on the use of pharmacologic and non-pharmacologic pain interventions. Problem: Serum Glucose Level - Abnormal:  Goal: Ability to maintain appropriate glucose levels will improve  Description  Ability to maintain appropriate glucose levels will improve  Outcome: Ongoing  RN will monitor blood glucose levels during this shift and assess for signs and symptoms of hyper/hypoglycemia. RN will manage insulin administration this shift as needed.

## 2019-05-08 NOTE — PROGRESS NOTES
Patient up in chair. Rating pain at a 4/10. Prn [place] administered. Son at bedside. VAISHNAVI drain to bulb suction, no kinks noted. Chair alarm engaged. No further needs expressed. Call light within reach. Will continue to monitor.

## 2019-05-10 NOTE — PROGRESS NOTES
Physical Therapy Discharge Summary    Name: Cindy Landers  : 1955    The pt was evaluated by PT on 19 and seen for no treatment sessions prior to DC to home on that day per MD order. The pt's acute therapy goals were:  Short term goals  Time Frame for Short term goals: by discharge   Short term goal 1: pt. will perform bed mobility with mod I   Short term goal 2: pt. will perform transfers independently   Short term goal 3: pt. will ambulate 300 ft w/o device with SBA   Short term goal 4: pt. will negotiate 1 flight of stairs ascending and descending with CGA   Long term goals  Time Frame for Long term goals : STG=LTG    Patient met 0 goals during stay. Number of Refusals:0  Number of Holds: 0  During this hospitalization, the patient was educated on:  Patient Education: reason for PT eval, discharge planning    DC pt from PT caseload at this time. Thank you!     Evelyn Dasilva, [address] [facility] [address]

## 2019-05-13 ENCOUNTER — NURSE ONLY (OUTPATIENT)
Dept: SURGERY | Age: 64
End: 2019-05-13

## 2019-05-13 VITALS — TEMPERATURE: 98.6 F | HEART RATE: 82 BPM | DIASTOLIC BLOOD PRESSURE: 79 MMHG | SYSTOLIC BLOOD PRESSURE: 124 MMHG

## 2019-05-13 DIAGNOSIS — Z48.89 ENCOUNTER FOR POST SURGICAL WOUND CHECK: ICD-10-CM

## 2019-05-13 PROCEDURE — 99024 POSTOP FOLLOW-UP VISIT: CPT | Performed by: SURGERY

## 2019-05-13 NOTE — PROGRESS NOTES
PCP:  Medical Oncology: long  Radiation:  Other:      [id number]  STAGE:  IIB right breast cancer      Ms. Alli Beck is a 66y.o.-year-old woman who is now s/p right partial mastectomy with axillary lymph node dissection for right breast cancer. She underwent this procedure on 2019 and tolerated it well. She is doing quite well postoperatively and her pain is continuing to improve. She did notice some drainage around her drain site however after a small plug of tissue came to the drain this was followed by 90 mL of serous fluid and now she is no longer noticing any significant leakage around her VAISHNAVI drain. INTERVAL HISTORY:  On 2019 she underwent a right partial mastectomy with axillary lymph node dissection. Pathology identified 1.6 cm of grade 3 invasive ductal carcinoma. ER negative CA negative HER-2 negative. Margins were negative. Disease approach the posterior margin but additional deep margin was taken with dissection was carried to the chest wall. There was  lymph nodes involved carcinoma.  [id number]. Pathology:    Department of Pathology  FINAL SURGICAL PATHOLOGY REPORT  Patient Name: Altaf Botello         Accession No:  [id number]   Age Sex:   1955    63 Y / F       Location:      DIS 01  Account No:   [de-identified]                  Collected:     2019  Med Rec No:    [id number]                 Received:      2019  Attend Phys: Sherry OLIVIA             Completed:     2019  Perform Phys: Sherry OLIVIA            FINAL DIAGNOSIS:       A. Breast, right, lumpectomy:       - Invasive ductal carcinoma, grade 3, excised (ypT1c, ypN1a).  See         comment.       - Prior biopsy site identified.     B. Breast, right, deep margin, reexcision:       - Benign breast tissue, negative for malignancy.     C. Axillary contents, right, dissection:       - 1 lymph node positive for metastatic carcinoma ().     COMMENT: INVASIVE CARCINOMA OF THE BREAST:  Procedure: Excision (less than total mastectomy)  Specimen Laterality: Right  Tumor Site: Invasive Carcinoma: Position: 10:30 o'clock  Tumor Size: Size of Largest Invasive Carcinoma: 1.6 cm  Histologic Type of Invasive Carcinoma: Invasive carcinoma of no special  type (ductal, not otherwise specified)  Histologic Grade: Jud Histologic Score:  Glandular (Acinar)/Tubular Differentiation       - Score 3: <10% of tumor area forming glandular/tubular structures  Nuclear Pleomorphism:       - Score 3: (vesicular nuclei, often with prominent nucleoli,  exhibiting marked variation in size and  shape, occasionally with very large and bizarre forms)  Mitotic Rate:       - Score 3 (?8 mitoses per mm2)  Overall Grade:       - Grade 3 (scores of 8 or 9)  Tumor Focality: Single focus of invasive carcinoma  Ductal Carcinoma In Situ (DCIS): Not identified  Lobular Carcinoma In Situ (LCIS): No LCIS in specimen  Margins:  Invasive Carcinoma Margins:        Uninvolved by invasive carcinoma                 Distance from closest margin: 0.5 mm                 Specify closest margin (required only if <10 mm):  posterior  Regional Lymph Nodes:    Involved by tumor cells       Number of lymph nodes with macrometastases (>2 mm): 1       Size of largest metastatic deposit: 6 mm       Extranodal Extension: Not identified         Number of lymph nodes examined: 11  Treatment Effect     Treatment Effect In the Breast:           Probable or definite response to presurgical therapy in the  invasive carcinoma     Treatment In the Lymph Nodes:           No definite response to presurgical therapy in metastatic  carcinoma  Lymph-Vascular Invasion: Not identified  Pathologic Staging (pTNM)  TNM Descriptors: y (post-treatment)  Primary Tumor (Invasive Carcinoma) (pT): pT1c: Tumor >10 mm but ?20 mm in  greatest dimension  Regional Lymph Nodes (pN):  Category (pN): pN1a: Metastases in 1 to 3 axillary lymph nodes, at least  1 metastasis

## 2019-05-13 NOTE — PROGRESS NOTES
Seen as a nurse visit in clinic today for a small \" blood Blister\"   Patient said it \" opened up last night and drained on her bra. \" small area noted with dark red tinged fluid. Not leaking at this time. Triple antibiotic ointment applied and Band-Aid to keep from rubbing on her clothes. Dressing to VAISHNAVI Drain incision changed, new Biopatch applied with 2x2 and small tegaderm. Insertion site noted to be clean without redness or drainage at opening. Patient will return for post operative appointment 5-20-19. Knows to call the office for any changes in blister.

## 2019-05-20 ENCOUNTER — OFFICE VISIT (OUTPATIENT)
Dept: SURGERY | Age: 64
End: 2019-05-20

## 2019-05-20 VITALS
HEIGHT: 61 IN | HEART RATE: 84 BPM | SYSTOLIC BLOOD PRESSURE: 109 MMHG | BODY MASS INDEX: 35.5 KG/M2 | DIASTOLIC BLOOD PRESSURE: 65 MMHG | WEIGHT: 188 LBS

## 2019-05-20 DIAGNOSIS — C50.911 PRIMARY BREAST MALIGNANCY, RIGHT (HCC): Primary | ICD-10-CM

## 2019-05-20 DIAGNOSIS — Z09 POSTOP CHECK: ICD-10-CM

## 2019-05-20 PROCEDURE — 99024 POSTOP FOLLOW-UP VISIT: CPT | Performed by: SURGERY

## 2019-05-29 ENCOUNTER — OFFICE VISIT (OUTPATIENT)
Dept: FAMILY MEDICINE CLINIC | Age: 64
End: 2019-05-29
Payer: COMMERCIAL

## 2019-05-29 VITALS
OXYGEN SATURATION: 97 % | DIASTOLIC BLOOD PRESSURE: 74 MMHG | SYSTOLIC BLOOD PRESSURE: 126 MMHG | HEART RATE: 93 BPM | BODY MASS INDEX: 35.52 KG/M2 | WEIGHT: 188 LBS

## 2019-05-29 DIAGNOSIS — E78.49 OTHER HYPERLIPIDEMIA: Primary | ICD-10-CM

## 2019-05-29 DIAGNOSIS — Z79.891 CHRONIC PRESCRIPTION OPIATE USE: ICD-10-CM

## 2019-05-29 DIAGNOSIS — E11.9 TYPE 2 DIABETES MELLITUS WITHOUT COMPLICATION, WITHOUT LONG-TERM CURRENT USE OF INSULIN (HCC): ICD-10-CM

## 2019-05-29 DIAGNOSIS — C50.911 PRIMARY BREAST MALIGNANCY, RIGHT (HCC): ICD-10-CM

## 2019-05-29 PROCEDURE — 99214 OFFICE O/P EST MOD 30 MIN: CPT | Performed by: INTERNAL MEDICINE

## 2019-05-29 RX ORDER — PRAVASTATIN SODIUM 20 MG
TABLET ORAL
Qty: 90 TABLET | Refills: 3 | Status: SHIPPED | OUTPATIENT
Start: 2019-05-29 | End: 2019-11-08 | Stop reason: SDUPTHER

## 2019-05-29 RX ORDER — METFORMIN HYDROCHLORIDE 500 MG/1
TABLET, EXTENDED RELEASE ORAL
Qty: 270 TABLET | Refills: 2 | Status: SHIPPED | OUTPATIENT
Start: 2019-05-29 | End: 2020-03-31 | Stop reason: SDUPTHER

## 2019-05-29 ASSESSMENT — ENCOUNTER SYMPTOMS
COUGH: 0
DIARRHEA: 0
CHEST TIGHTNESS: 0
SHORTNESS OF BREATH: 0
SINUS PRESSURE: 0
CONSTIPATION: 0
ABDOMINAL PAIN: 0
VOMITING: 0
NAUSEA: 0

## 2019-05-29 NOTE — PROGRESS NOTES
Kristin Padilla  : 1955  Encounter date: 2019    This jeromy 66 y.o. female who presents with  Chief Complaint   Patient presents with    Diabetes       History of present illness:    HPI Patient presents today for follow up on diabetes. She reports she is doing well overall. She still has 1 drain in from her breast surgery, and is putting out about 25 mL of serous fluid a day. She has not yet started radiation, but will likely have a more definitive timeline within the next week. As her blood sugars are better controlled over the last few weeks since completing chemotherapy. She reports her appetite is good and she is feeling well enough to be more active. She reports otherwise she is doing well without any complaints. Allergies   Allergen Reactions    Advil Pm [Ibuprofen-Diphenhydramine Cit] Shortness Of Breath     All NSAID    Aspirin Shortness Of Breath    Ibuprofen Shortness Of Breath    Zithromax [Azithromycin Dihydrate] Nausea And Vomiting and Other (See Comments)     Stomach cramping    Lisinopril Other (See Comments)     Angioedema, 2018    Xanax [Alprazolam] Other (See Comments)     Slurred speech  Patient felt \"out of control\"     Current Outpatient Medications   Medication Sig Dispense Refill    Multiple Vitamins-Minerals (MULTIVITAMIN ADULT PO) Take by mouth      triamcinolone (KENALOG) 0.1 % ointment Apply topically 2 times daily 80 g 2    pravastatin (PRAVACHOL) 20 MG tablet TAKE 1 TABLET DAILY 90 tablet 3    metFORMIN (GLUCOPHAGE-XR) 500 MG extended release tablet TAKE ONE TABLET BY MOUTH THREE TIMES A DAY WITH MEALS 270 tablet 2    traMADol (ULTRAM) 50 MG tablet Take 1 tablet by mouth every 8 hours as needed for Pain for up to 30 days.  100 tablet 0    citalopram (CELEXA) 10 MG tablet TAKE ONE TABLET BY MOUTH DAILY 90 tablet 1    diclofenac 1.5 % SOLN APPLY 2 SQUIRTS TO THE AFFECTED AREA TWO TIMES A  mL 1    loratadine (CLARITIN) 10 MG tablet Take 10 mg by mouth daily      omeprazole (PRILOSEC) 40 MG delayed release capsule One daily 90 capsule 3    oxybutynin (DITROPAN) 5 MG tablet Take 1 tablet by mouth 2 times daily 180 tablet 3    Calcium Carbonate-Vit D-Min (CALCIUM 1200 PO) Take by mouth      Cholecalciferol (VITAMIN D3) 2000 units CAPS Take by mouth      vitamin C (ASCORBIC ACID) 500 MG tablet Take 500 mg by mouth daily      losartan (COZAAR) 50 MG tablet Take 1 tablet by mouth daily 90 tablet 3    Glucose Blood ([employer] TEST VI) 1 Device by In Vitro route.  glucose blood VI test strips ([employer] TEST STRIPS) strip 1 each by In Vitro route daily. As needed. Current Facility-Administered Medications   Medication Dose Route Frequency Provider Last Rate Last Dose    betamethasone acetate-betamethasone sodium phosphate (CELESTONE) injection 12 mg  12 mg Intra-articular Once Pedro Early MD            Review of Systems   Constitutional: Negative for activity change, appetite change, chills, fatigue and fever. HENT: Negative for congestion and sinus pressure. Respiratory: Negative for cough, chest tightness and shortness of breath. Cardiovascular: Negative for chest pain and palpitations. Gastrointestinal: Negative for abdominal pain, constipation, diarrhea, nausea and vomiting. Genitourinary: Negative for difficulty urinating. Musculoskeletal: Negative for arthralgias. Skin: Negative for rash. Neurological: Negative for headaches. Past medical, surgical, family and social history were reviewed and updated with the patient.     Objective:    /74 (Site: Left Upper Arm, Position: Sitting, Cuff Size: Large Adult)   Pulse 93   Wt 188 lb (85.3 kg)   SpO2 97%   BMI 35.52 kg/m²   Weight: 188 lb (85.3 kg)     BP Readings from Last 3 Encounters:   05/29/19 126/74   05/20/19 109/65   05/13/19 124/79     Wt Readings from Last 3 Encounters:   05/29/19 188 lb (85.3 kg)   05/20/19 188 lb (85.3 kg)   05/07/19 185 lb (83.9

## 2019-05-31 ENCOUNTER — NURSE ONLY (OUTPATIENT)
Dept: SURGERY | Age: 64
End: 2019-05-31

## 2019-05-31 VITALS — TEMPERATURE: 98.4 F | HEART RATE: 84 BPM | DIASTOLIC BLOOD PRESSURE: 77 MMHG | SYSTOLIC BLOOD PRESSURE: 118 MMHG

## 2019-05-31 DIAGNOSIS — Z48.03 ENCOUNTER FOR CHANGE OR REMOVAL OF DRAINS: Primary | ICD-10-CM

## 2019-05-31 NOTE — PROGRESS NOTES
Seen in clinic today to have 22587 Lahser removed. Drainage has been less than 20 ml's for the past 2 days. Drain insertion site noted to have small red irritation. No pus noted at site. Area cleansed with normal saline, suture cut and VAISHNAVI Drain removed without difficulty. White tip intact. Small pressure dressing using 2x2 and band-aid applied. Instructed to call office if she started to have any problems with site. Patient verbalized understanding.

## 2019-06-10 ENCOUNTER — HOSPITAL ENCOUNTER (OUTPATIENT)
Age: 64
Discharge: HOME | End: 2019-06-10
Payer: COMMERCIAL

## 2019-06-10 DIAGNOSIS — E78.49 OTHER HYPERLIPIDEMIA: ICD-10-CM

## 2019-06-10 DIAGNOSIS — E11.9 TYPE 2 DIABETES MELLITUS WITHOUT COMPLICATION, WITHOUT LONG-TERM CURRENT USE OF INSULIN (HCC): ICD-10-CM

## 2019-06-10 LAB
CHOLESTEROL, TOTAL: 154 MG/DL (ref 0–199)
HDLC SERPL-MCNC: 47 MG/DL (ref 40–60)
LDL CHOLESTEROL CALCULATED: 48 MG/DL
TRIGL SERPL-MCNC: 296 MG/DL (ref 0–150)
VLDLC SERPL CALC-MCNC: 59 MG/DL

## 2019-06-10 PROCEDURE — 36415 COLL VENOUS BLD VENIPUNCTURE: CPT

## 2019-06-10 PROCEDURE — 83036 HEMOGLOBIN GLYCOSYLATED A1C: CPT

## 2019-06-10 PROCEDURE — 80061 LIPID PANEL: CPT

## 2019-06-11 LAB
ESTIMATED AVERAGE GLUCOSE: 131.2 MG/DL
HBA1C MFR BLD: 6.2 %

## 2019-06-24 RX ORDER — LOSARTAN POTASSIUM 50 MG/1
50 TABLET ORAL DAILY
Qty: 90 TABLET | Refills: 3 | Status: SHIPPED | OUTPATIENT
Start: 2019-06-24 | End: 2020-06-30 | Stop reason: SDUPTHER

## 2019-07-09 DIAGNOSIS — G89.29 CHRONIC MIDLINE LOW BACK PAIN WITHOUT SCIATICA: ICD-10-CM

## 2019-07-09 DIAGNOSIS — M17.0 PRIMARY OSTEOARTHRITIS OF BOTH KNEES: ICD-10-CM

## 2019-07-09 DIAGNOSIS — M54.50 CHRONIC MIDLINE LOW BACK PAIN WITHOUT SCIATICA: ICD-10-CM

## 2019-07-10 RX ORDER — TRAMADOL HYDROCHLORIDE 50 MG/1
TABLET ORAL
Qty: 90 TABLET | Refills: 0 | Status: SHIPPED | OUTPATIENT
Start: 2019-07-10 | End: 2019-08-13 | Stop reason: SDUPTHER

## 2019-08-05 NOTE — PROGRESS NOTES
PCP:  Medical Oncology: long  Radiation: grass  Other:        [id number]  STAGE:  IIB right breast cancer      Ms. Martha Encinas is a 66y.o.-year-old woman who initially presented to me with  right breast cancer. Since her postoperative visit Ms. Martha Encinas has been doing well. Her adjuvant treatment has included radiation which she just completed. Unfortunately over the past few days she has started to notice blistering, worse so in the axilla. INTERVAL HISTORY:     On 11/16/2018 she initiated neoadjuvant chemotherapy with ddAC ->T    She completed neoadjuvant chemotherapy in April 2019. On 5/7/2019 she underwent a right partial mastectomy with axillary lymph node dissection. Pathology identified 1.6 cm of grade 3 invasive ductal carcinoma. ER negative [place] negative HER-2 negative. Margins were negative. Disease approach the posterior margin but additional deep margin was taken with dissection was carried to the chest wall. There was 1/11 lymph nodes involved carcinoma.  [id number]. Exam:  Physical exam has been reviewed and updated  General: no acute distress  Breast:  The patient was examined in the upright and supine position. There is a well healed scar on the right breast. There is a similarly well healed ipsilateral axillary scar. There are expected  post surgical and radiation related changes. This includes blistering in the right axilla and on her back. She has good range of motion with her arm. Her contralateral breast shows no new masses or changes in breast contour. There were no skin changes of the breast or nipple areolar complex. There was no nipple inversion or discharge.    Respiratory: respirations are non-labored and there is no audible distress  Cardiovascular: regular rate, extremities appear well perfused  Neurologic: alert, oriented      Assessment/Plan:  [id number]  STAGE:  IIB right breast cancer  ER/[place]/HER2 negative  S/p neoadjuvant chemotherapy  S/p PM with SLNB  S/p XRT    There are no current signs of recurrence. We discussed that blistering can be common after radiation. She plans to stop by the radiation office to discuss topical treatments. I reviewed aquafore as well. Signs/symptoms of recurrence were reviewed. She verbalizes understanding that she should notify our office if she identifies any abnormalities on self evaluation as it may require further workup. I encouraged her to continue self breast evaluation. Follow up surveillance was discussed. Our plan at this time is to follow up with surgical breast oncology office in ~6 months for a clinical breast exam. We will plan for bilateral breast imaging in December 2019. All of the patient's questions were answered at this time however, she was encouraged to call the office with any further inquiries. Approximately 15 minutes of time were spent in this visit of which 50% or more of the time was related to coordination of care.

## 2019-08-12 ENCOUNTER — OFFICE VISIT (OUTPATIENT)
Dept: SURGERY | Age: 64
End: 2019-08-12
Payer: COMMERCIAL

## 2019-08-12 VITALS
DIASTOLIC BLOOD PRESSURE: 63 MMHG | HEART RATE: 94 BPM | WEIGHT: 192 LBS | TEMPERATURE: 98.6 F | OXYGEN SATURATION: 99 % | HEIGHT: 61 IN | BODY MASS INDEX: 36.25 KG/M2 | SYSTOLIC BLOOD PRESSURE: 120 MMHG

## 2019-08-12 DIAGNOSIS — Z08 ENCOUNTER FOR FOLLOW-UP SURVEILLANCE OF BREAST CANCER: Primary | ICD-10-CM

## 2019-08-12 DIAGNOSIS — Z85.3 PERSONAL HISTORY OF BREAST CANCER: ICD-10-CM

## 2019-08-12 DIAGNOSIS — Z85.3 ENCOUNTER FOR FOLLOW-UP SURVEILLANCE OF BREAST CANCER: Primary | ICD-10-CM

## 2019-08-12 PROCEDURE — 99213 OFFICE O/P EST LOW 20 MIN: CPT | Performed by: SURGERY

## 2019-08-13 ENCOUNTER — OFFICE VISIT (OUTPATIENT)
Dept: FAMILY MEDICINE CLINIC | Age: 64
End: 2019-08-13
Payer: COMMERCIAL

## 2019-08-13 ENCOUNTER — HOSPITAL ENCOUNTER (OUTPATIENT)
Age: 64
End: 2019-08-13
Payer: COMMERCIAL

## 2019-08-13 ENCOUNTER — HOSPITAL ENCOUNTER (OUTPATIENT)
Dept: GENERAL RADIOLOGY | Age: 64
Discharge: HOME | End: 2019-08-13
Payer: COMMERCIAL

## 2019-08-13 ENCOUNTER — HOSPITAL ENCOUNTER (OUTPATIENT)
Age: 64
Discharge: HOME | End: 2019-08-13
Payer: COMMERCIAL

## 2019-08-13 VITALS
HEART RATE: 88 BPM | BODY MASS INDEX: 35.56 KG/M2 | SYSTOLIC BLOOD PRESSURE: 126 MMHG | OXYGEN SATURATION: 97 % | DIASTOLIC BLOOD PRESSURE: 72 MMHG | WEIGHT: 188.2 LBS

## 2019-08-13 DIAGNOSIS — M17.0 PRIMARY OSTEOARTHRITIS OF BOTH KNEES: ICD-10-CM

## 2019-08-13 DIAGNOSIS — M54.50 CHRONIC MIDLINE LOW BACK PAIN WITHOUT SCIATICA: ICD-10-CM

## 2019-08-13 DIAGNOSIS — G89.29 CHRONIC MIDLINE LOW BACK PAIN WITHOUT SCIATICA: ICD-10-CM

## 2019-08-13 DIAGNOSIS — E11.9 DIABETES MELLITUS WITHOUT COMPLICATION (HCC): Primary | ICD-10-CM

## 2019-08-13 DIAGNOSIS — E11.9 DIABETES MELLITUS WITHOUT COMPLICATION (HCC): ICD-10-CM

## 2019-08-13 DIAGNOSIS — C50.911 PRIMARY BREAST MALIGNANCY, RIGHT (HCC): ICD-10-CM

## 2019-08-13 LAB
A/G RATIO: 1.4 (ref 1.1–2.2)
ALBUMIN SERPL-MCNC: 4.1 G/DL (ref 3.4–5)
ALP BLD-CCNC: 85 U/L (ref 40–129)
ALT SERPL-CCNC: 19 U/L (ref 10–40)
ANION GAP SERPL CALCULATED.3IONS-SCNC: 17 MMOL/L (ref 3–16)
AST SERPL-CCNC: 20 U/L (ref 15–37)
BILIRUB SERPL-MCNC: 0.3 MG/DL (ref 0–1)
BUN BLDV-MCNC: 20 MG/DL (ref 7–20)
CALCIUM SERPL-MCNC: 10.3 MG/DL (ref 8.3–10.6)
CHLORIDE BLD-SCNC: 104 MMOL/L (ref 99–110)
CHOLESTEROL, TOTAL: 130 MG/DL (ref 0–199)
CO2: 24 MMOL/L (ref 21–32)
CREAT SERPL-MCNC: 0.9 MG/DL (ref 0.6–1.2)
ESTIMATED AVERAGE GLUCOSE: 137 MG/DL
GFR AFRICAN AMERICAN: >60
GFR NON-AFRICAN AMERICAN: >60
GLOBULIN: 3 G/DL
GLUCOSE BLD-MCNC: 188 MG/DL (ref 70–99)
HBA1C MFR BLD: 6.4 %
HDLC SERPL-MCNC: 42 MG/DL (ref 40–60)
LDL CHOLESTEROL CALCULATED: 38 MG/DL
POTASSIUM SERPL-SCNC: 4.9 MMOL/L (ref 3.5–5.1)
SODIUM BLD-SCNC: 145 MMOL/L (ref 136–145)
TOTAL PROTEIN: 7.1 G/DL (ref 6.4–8.2)
TRIGL SERPL-MCNC: 248 MG/DL (ref 0–150)
VLDLC SERPL CALC-MCNC: 50 MG/DL

## 2019-08-13 PROCEDURE — 73562 X-RAY EXAM OF KNEE 3: CPT

## 2019-08-13 PROCEDURE — 80053 COMPREHEN METABOLIC PANEL: CPT

## 2019-08-13 PROCEDURE — 83036 HEMOGLOBIN GLYCOSYLATED A1C: CPT

## 2019-08-13 PROCEDURE — 99213 OFFICE O/P EST LOW 20 MIN: CPT | Performed by: INTERNAL MEDICINE

## 2019-08-13 PROCEDURE — 80061 LIPID PANEL: CPT

## 2019-08-13 PROCEDURE — 36415 COLL VENOUS BLD VENIPUNCTURE: CPT

## 2019-08-13 RX ORDER — PRENATAL VIT 91/IRON/FOLIC/DHA 28-975-200
COMBINATION PACKAGE (EA) ORAL
Qty: 36 G | Refills: 1 | Status: SHIPPED | OUTPATIENT
Start: 2019-08-13 | End: 2020-02-13 | Stop reason: ALTCHOICE

## 2019-08-13 RX ORDER — TRAMADOL HYDROCHLORIDE 50 MG/1
50 TABLET ORAL EVERY 8 HOURS PRN
Qty: 90 TABLET | Refills: 0 | Status: SHIPPED | OUTPATIENT
Start: 2019-08-13 | End: 2019-10-08 | Stop reason: SDUPTHER

## 2019-08-13 ASSESSMENT — ENCOUNTER SYMPTOMS
SHORTNESS OF BREATH: 0
BACK PAIN: 1
VOMITING: 0
COUGH: 0
NAUSEA: 0

## 2019-08-13 NOTE — PROGRESS NOTES
Denise Marie  : 1955  Encounter date: 2019    This jeromy 66 y.o. female who presents with  Chief Complaint   Patient presents with    Diabetes       History of present illness:    HPI Patient presents today for 3 month follow up on diabetes and controlled substance review. She reports she is doing well overall. She reports her right knee, which had a prior meniscectomy, is acting up as she gets back to work. She reports a constant ache with sharp pain radiating up into her right VMO. Pain is worse with going up and down ladders. Tramadol and aspercream help with pain. She also reports a rash on her right foot. It is usually itchy and has not responded to topical TAC. She reports her AM blood sugar is running 140-150, which is higher than usual for her. She is tolerating her medications well w/o side effects or problems. Allergies   Allergen Reactions    Advil Pm [Ibuprofen-Diphenhydramine Cit] Shortness Of Breath     All NSAID    Aspirin Shortness Of Breath    Ibuprofen Shortness Of Breath    Zithromax [Azithromycin Dihydrate] Nausea And Vomiting and Other (See Comments)     Stomach cramping    Lisinopril Other (See Comments)     Angioedema, 2018    Xanax [Alprazolam] Other (See Comments)     Slurred speech  Patient felt \"out of control\"     Current Outpatient Medications   Medication Sig Dispense Refill    traMADol (ULTRAM) 50 MG tablet Take 1 tablet by mouth every 8 hours as needed for Pain for up to 34 days. 90 tablet 0    terbinafine (ATHLETES FOOT) 1 % cream Apply topically 2 times daily.  36 g 1    losartan (COZAAR) 50 MG tablet Take 1 tablet by mouth daily 90 tablet 3    Multiple Vitamins-Minerals (MULTIVITAMIN ADULT PO) Take by mouth      triamcinolone (KENALOG) 0.1 % ointment Apply topically 2 times daily 80 g 2    pravastatin (PRAVACHOL) 20 MG tablet TAKE 1 TABLET DAILY 90 tablet 3    metFORMIN (GLUCOPHAGE-XR) 500 MG extended release tablet TAKE ONE TABLET BY MOUTH lb 3.2 oz (85.4 kg)   08/12/19 192 lb (87.1 kg)   05/29/19 188 lb (85.3 kg)       Physical Exam   Constitutional: She is oriented to person, place, and time. She appears well-developed and well-nourished. No distress. HENT:   Head: Normocephalic and atraumatic. Right Ear: External ear normal.   Left Ear: External ear normal.   Mouth/Throat: Oropharynx is clear and moist.   Eyes: Pupils are equal, round, and reactive to light. Conjunctivae and EOM are normal. Right eye exhibits no discharge. Left eye exhibits no discharge. Neck: Normal range of motion. Neck supple. No thyromegaly present. Cardiovascular: Normal rate, regular rhythm, normal heart sounds and intact distal pulses. Exam reveals no gallop and no friction rub. No murmur heard. Pulmonary/Chest: Effort normal and breath sounds normal. No respiratory distress. She has no wheezes. She has no rales. She exhibits no tenderness. Musculoskeletal: She exhibits no edema or deformity. Very limited patellar range of motion. Mild tenderness palpation lateral joint line. Positive medial and lateral Brandy's. No effusion or skin changes. Lymphadenopathy:     She has no cervical adenopathy. Neurological: She is alert and oriented to person, place, and time. Skin: Skin is warm and dry. She is not diaphoretic. Psychiatric: She has a normal mood and affect. Her behavior is normal. Judgment and thought content normal.   Vitals reviewed. Visual inspection:  Deformity/amputation: absent  Skin lesions/pre-ulcerative calluses: desquamating rash over sole of right foot only  Edema: right- negative, left- negative    Sensory exam:  Monofilament sensation: normal  (minimum of 5 random plantar locations tested, avoiding callused areas - > 1 area with absence of sensation is + for neuropathy)    Plus at least one of the following:  Pulses: normal,     Assessment/Plan    1.  Chronic midline low back pain without sciatica  - traMADol (ULTRAM) 50 MG tablet;

## 2019-08-27 ENCOUNTER — OFFICE VISIT (OUTPATIENT)
Dept: FAMILY MEDICINE CLINIC | Age: 64
End: 2019-08-27
Payer: COMMERCIAL

## 2019-08-27 VITALS
BODY MASS INDEX: 34.99 KG/M2 | OXYGEN SATURATION: 98 % | SYSTOLIC BLOOD PRESSURE: 118 MMHG | DIASTOLIC BLOOD PRESSURE: 70 MMHG | HEART RATE: 81 BPM | WEIGHT: 185.2 LBS

## 2019-08-27 DIAGNOSIS — M17.0 PRIMARY OSTEOARTHRITIS OF BOTH KNEES: ICD-10-CM

## 2019-08-27 DIAGNOSIS — M25.561 RIGHT KNEE PAIN, UNSPECIFIED CHRONICITY: Primary | ICD-10-CM

## 2019-08-27 PROCEDURE — 99212 OFFICE O/P EST SF 10 MIN: CPT | Performed by: INTERNAL MEDICINE

## 2019-08-27 PROCEDURE — 20610 DRAIN/INJ JOINT/BURSA W/O US: CPT | Performed by: INTERNAL MEDICINE

## 2019-08-27 RX ORDER — BETAMETHASONE SODIUM PHOSPHATE AND BETAMETHASONE ACETATE 3; 3 MG/ML; MG/ML
6 INJECTION, SUSPENSION INTRA-ARTICULAR; INTRALESIONAL; INTRAMUSCULAR; SOFT TISSUE ONCE
Status: COMPLETED | OUTPATIENT
Start: 2019-08-27 | End: 2019-08-27

## 2019-08-27 RX ORDER — HYALURONATE SODIUM 10 MG/ML
20 SYRINGE (ML) INTRAARTICULAR WEEKLY
Qty: 6 ML | Refills: 0 | Status: SHIPPED | OUTPATIENT
Start: 2019-08-27 | End: 2019-09-10

## 2019-08-27 RX ADMIN — BETAMETHASONE SODIUM PHOSPHATE AND BETAMETHASONE ACETATE 6 MG: 3; 3 INJECTION, SUSPENSION INTRA-ARTICULAR; INTRALESIONAL; INTRAMUSCULAR; SOFT TISSUE at 10:28

## 2019-08-27 ASSESSMENT — ENCOUNTER SYMPTOMS
COUGH: 0
SHORTNESS OF BREATH: 0

## 2019-08-27 NOTE — PROGRESS NOTES
Lucy iRvera  : 1955  Encounter date: 2019    This jeromy 60 y.o. female who presents with  Chief Complaint   Patient presents with    Knee Pain       History of present illness:    HPI Patient presents today for an injection of the right knee for knee pain. Patient reports she has had to  extra shifts at work, which is made her knee more irritated. She reports she had a hyaluronic acid injection in her knee about 3 years ago, which did fantastic until the last few months. She reports otherwise she is doing okay. She is split her metformin dose which seems to be controlling her blood sugar a little bit better. The initial dose of chemotherapy was a little too much for her so she had to decrease the dose and is following up with oncology later on today. Allergies   Allergen Reactions    Advil Pm [Ibuprofen-Diphenhydramine Cit] Shortness Of Breath     All NSAID    Aspirin Shortness Of Breath    Ibuprofen Shortness Of Breath    Zithromax [Azithromycin Dihydrate] Nausea And Vomiting and Other (See Comments)     Stomach cramping    Lisinopril Other (See Comments)     Angioedema, 2018    Xanax [Alprazolam] Other (See Comments)     Slurred speech  Patient felt \"out of control\"     Current Outpatient Medications   Medication Sig Dispense Refill    traMADol (ULTRAM) 50 MG tablet Take 1 tablet by mouth every 8 hours as needed for Pain for up to 34 days. 90 tablet 0    terbinafine (ATHLETES FOOT) 1 % cream Apply topically 2 times daily.  36 g 1    losartan (COZAAR) 50 MG tablet Take 1 tablet by mouth daily 90 tablet 3    Multiple Vitamins-Minerals (MULTIVITAMIN ADULT PO) Take by mouth      triamcinolone (KENALOG) 0.1 % ointment Apply topically 2 times daily 80 g 2    pravastatin (PRAVACHOL) 20 MG tablet TAKE 1 TABLET DAILY 90 tablet 3    metFORMIN (GLUCOPHAGE-XR) 500 MG extended release tablet TAKE ONE TABLET BY MOUTH THREE TIMES A DAY WITH MEALS 270 tablet 2    citalopram (CELEXA)

## 2019-08-29 ENCOUNTER — TELEPHONE (OUTPATIENT)
Dept: RHEUMATOLOGY | Age: 64
End: 2019-08-29

## 2019-08-29 NOTE — TELEPHONE ENCOUNTER
PA submitted for Sodium Hyaluronate 20MG/2ML syringes on CM  Key: [id number] - PA Case ID: [id number] - Rx #: [id number]     Pending review.

## 2019-09-05 ENCOUNTER — TELEPHONE (OUTPATIENT)
Dept: FAMILY MEDICINE CLINIC | Age: 64
End: 2019-09-05

## 2019-09-09 RX ORDER — CITALOPRAM 10 MG/1
TABLET ORAL
Qty: 90 TABLET | Refills: 3 | Status: SHIPPED | OUTPATIENT
Start: 2019-09-09 | End: 2020-08-18 | Stop reason: SDUPTHER

## 2019-09-10 ENCOUNTER — OFFICE VISIT (OUTPATIENT)
Dept: FAMILY MEDICINE CLINIC | Age: 64
End: 2019-09-10
Payer: COMMERCIAL

## 2019-09-10 VITALS
WEIGHT: 184 LBS | DIASTOLIC BLOOD PRESSURE: 64 MMHG | BODY MASS INDEX: 34.77 KG/M2 | OXYGEN SATURATION: 97 % | HEART RATE: 79 BPM | SYSTOLIC BLOOD PRESSURE: 110 MMHG

## 2019-09-10 DIAGNOSIS — M17.0 PRIMARY OSTEOARTHRITIS OF BOTH KNEES: Primary | ICD-10-CM

## 2019-09-10 PROCEDURE — 20610 DRAIN/INJ JOINT/BURSA W/O US: CPT | Performed by: INTERNAL MEDICINE

## 2019-09-10 RX ORDER — HYALURONATE SODIUM 10 MG/ML
20 SYRINGE (ML) INTRAARTICULAR ONCE
Status: COMPLETED | OUTPATIENT
Start: 2019-09-10 | End: 2019-09-10

## 2019-09-10 RX ADMIN — Medication 20 MG: at 08:53

## 2019-09-10 ASSESSMENT — ENCOUNTER SYMPTOMS
COUGH: 0
SHORTNESS OF BREATH: 0

## 2019-09-17 ENCOUNTER — OFFICE VISIT (OUTPATIENT)
Dept: FAMILY MEDICINE CLINIC | Age: 64
End: 2019-09-17
Payer: COMMERCIAL

## 2019-09-17 VITALS
HEART RATE: 79 BPM | DIASTOLIC BLOOD PRESSURE: 76 MMHG | OXYGEN SATURATION: 98 % | BODY MASS INDEX: 34.5 KG/M2 | WEIGHT: 182.6 LBS | SYSTOLIC BLOOD PRESSURE: 124 MMHG

## 2019-09-17 DIAGNOSIS — M25.561 RIGHT KNEE PAIN, UNSPECIFIED CHRONICITY: Primary | ICD-10-CM

## 2019-09-17 PROCEDURE — 99212 OFFICE O/P EST SF 10 MIN: CPT | Performed by: INTERNAL MEDICINE

## 2019-09-17 PROCEDURE — 20610 DRAIN/INJ JOINT/BURSA W/O US: CPT | Performed by: INTERNAL MEDICINE

## 2019-09-17 RX ORDER — HYALURONATE SODIUM 10 MG/ML
20 SYRINGE (ML) INTRAARTICULAR ONCE
Status: COMPLETED | OUTPATIENT
Start: 2019-09-17 | End: 2019-09-17

## 2019-09-17 RX ADMIN — Medication 20 MG: at 08:38

## 2019-09-17 ASSESSMENT — ENCOUNTER SYMPTOMS
COUGH: 0
SHORTNESS OF BREATH: 0

## 2019-09-19 NOTE — TELEPHONE ENCOUNTER
Per CMM>>Approved on August 29   CaseId:[id number];Status:Approved; Review Type:Prior Auth; Coverage Start Date:07/30/2019; Coverage End Date:09/29/2019;

## 2019-09-19 NOTE — PROGRESS NOTES
4 Eyes Skin Assessment     The patient is being assess for  Post-Op Surgical, Admission     I agree that 2 RN's have performed a thorough Head to Toe Skin Assessment on the patient. ALL assessment sites listed below have been assessed. Areas assessed by both nurses: head to toe  [x]   Head, Face, and Ears   [x]   Shoulders, Back, and Chest  [x]   Arms, Elbows, and Hands   [x]   Coccyx, Sacrum, and IschIum  [x]   Legs, Feet, and Heels        Does the Patient have Skin Breakdown?   No         Cornel Prevention initiated:  NA   Wound Care Orders initiated:  NA      [facility] nurse consulted for Pressure Injury (Stage 3,4, Unstageable, DTI, NWPT, and Complex wounds), New and Established Ostomies:  No      Nurse 1 eSignature: Electronically signed by Sarwat Marin RN on 5/7/19 at 5:01 PM    **SHARE this note so that the co-signing nurse is able to place an eSignature**    Nurse 2 eSignature: Electronically signed by Pepe Lamar RN on 5/7/19 at 5:03 PM OP HEMATOLOGY/ONCOLOGY PROGRESS NOTE      Pt Name: Janeth Hernandez  YOB: 1956  MRN: [id number]  Referring provider: No ref. provider found   PCP: Betsey iSmmons MD      Date of evaluation: 9/20/2019  History Obtained From:  patient, electronic medical record    CHIEF COMPLAINT:    Chief Complaint   Patient presents with    Other     Light chain disease, kappa type ([address]     Subjective:   Janeth Hernandez is a 60-year-old  gentleman referred to the clinic on 8/20/2019 by Dr. 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 He has had extensive evaluation by PCP, neurology, neurosurgery, and rheumatology as noted in the HPI. He is here today to review serology completed 8/20/2019 for follow-up regarding the abnormal kappa light chain. Symptoms are essentially unchanged. Izabela Rivas continues gabapentin 300 mg po TID and amitriptyline 25 mg at bedtime. He does feel the amitriptyline helps him rest at nighttime. He denies new development. Nerve conduction studies were completed 9/9/2019 in [place]. Izabela Rivas is awaiting results. Labs 8/20/2019:  · SPEP: Normal  · Immunofixation: Not indicated  · QIs: Normal.  IgA 164, IgG 1020, IgM 60  · Kappa light chain 1.75  · Lambda light chain 1.38  · Kappa/lambda ratio 1.38  · B2M: 2.2  · CMP: Creatinine 0.8, GFR >60, Ca+ 10.3, TP 7.9  · LDH: 154  · Cryoglobulin: Negative  · Cold agglutinin titer: <1:32, negative    HEMATOLOGIC HISTORY:  Janeth Hernandez was referred to the clinic on 8/20/2019 by Dr. Janelle Philip regarding an abnormal kappa light chain. He has been under evaluation for a 1 year history of pain to the bottom of his feet. Initially, Izabela Rivas felt this was related to his shoes, however did not improve after several new pairs of shoes. Symptoms progressed more than sore feet. He began having \"shooting nerve pain\"and burning throughout his body.   Izabela Rivas reports jay/purplish thrombocytopenia  - Platelets 621,389  - check copper, zinc, lead, folate  - consider US spleen          Ravin COLT Christianson  1:02 PM  9/20/2019

## 2019-09-24 ENCOUNTER — OFFICE VISIT (OUTPATIENT)
Dept: FAMILY MEDICINE CLINIC | Age: 64
End: 2019-09-24
Payer: COMMERCIAL

## 2019-09-24 VITALS
DIASTOLIC BLOOD PRESSURE: 78 MMHG | BODY MASS INDEX: 34.54 KG/M2 | SYSTOLIC BLOOD PRESSURE: 118 MMHG | OXYGEN SATURATION: 94 % | WEIGHT: 182.8 LBS | HEART RATE: 73 BPM

## 2019-09-24 DIAGNOSIS — M17.0 PRIMARY OSTEOARTHRITIS OF BOTH KNEES: Primary | ICD-10-CM

## 2019-09-24 PROCEDURE — 99212 OFFICE O/P EST SF 10 MIN: CPT | Performed by: INTERNAL MEDICINE

## 2019-09-24 PROCEDURE — 20610 DRAIN/INJ JOINT/BURSA W/O US: CPT | Performed by: INTERNAL MEDICINE

## 2019-09-24 RX ORDER — BETAMETHASONE SODIUM PHOSPHATE AND BETAMETHASONE ACETATE 3; 3 MG/ML; MG/ML
6 INJECTION, SUSPENSION INTRA-ARTICULAR; INTRALESIONAL; INTRAMUSCULAR; SOFT TISSUE ONCE
Status: COMPLETED | OUTPATIENT
Start: 2019-09-24 | End: 2019-09-24

## 2019-09-24 RX ORDER — CEPHALEXIN 500 MG/1
CAPSULE ORAL
COMMUNITY
Start: 2019-09-17 | End: 2020-02-13 | Stop reason: ALTCHOICE

## 2019-09-24 RX ADMIN — BETAMETHASONE SODIUM PHOSPHATE AND BETAMETHASONE ACETATE 6 MG: 3; 3 INJECTION, SUSPENSION INTRA-ARTICULAR; INTRALESIONAL; INTRAMUSCULAR; SOFT TISSUE at 08:45

## 2019-09-24 NOTE — PROGRESS NOTES
Amor Beatty  : 1955  Encounter date: 2019    This jeromy 60 y.o. female who presents with  Chief Complaint   Patient presents with    Injections       History of present illness:    HPI Patient presents today for 3rd Euflexxa injection. She reports she is doing better from a knee OA perspective. She reports she is already feeling the benefit from the 1st 2 injections. She had no adverse reactions from the last injection    Allergies   Allergen Reactions    Advil Pm [Ibuprofen-Diphenhydramine Cit] Shortness Of Breath     All NSAID    Aspirin Shortness Of Breath    Ibuprofen Shortness Of Breath    Zithromax [Azithromycin Dihydrate] Nausea And Vomiting and Other (See Comments)     Stomach cramping    Lisinopril Other (See Comments)     Angioedema, 2018    Xanax [Alprazolam] Other (See Comments)     Slurred speech  Patient felt \"out of control\"     Current Outpatient Medications   Medication Sig Dispense Refill    cephALEXin (KEFLEX) 500 MG capsule       citalopram (CELEXA) 10 MG tablet TAKE ONE TABLET BY MOUTH DAILY 90 tablet 3    traMADol (ULTRAM) 50 MG tablet Take 1 tablet by mouth every 8 hours as needed for Pain for up to 34 days. 90 tablet 0    terbinafine (ATHLETES FOOT) 1 % cream Apply topically 2 times daily.  36 g 1    losartan (COZAAR) 50 MG tablet Take 1 tablet by mouth daily 90 tablet 3    Multiple Vitamins-Minerals (MULTIVITAMIN ADULT PO) Take by mouth      triamcinolone (KENALOG) 0.1 % ointment Apply topically 2 times daily 80 g 2    pravastatin (PRAVACHOL) 20 MG tablet TAKE 1 TABLET DAILY 90 tablet 3    metFORMIN (GLUCOPHAGE-XR) 500 MG extended release tablet TAKE ONE TABLET BY MOUTH THREE TIMES A DAY WITH MEALS 270 tablet 2    diclofenac 1.5 % SOLN APPLY 2 SQUIRTS TO THE AFFECTED AREA TWO TIMES A  mL 1    loratadine (CLARITIN) 10 MG tablet Take 10 mg by mouth daily      omeprazole (PRILOSEC) 40 MG delayed release capsule One daily 90 capsule 3    oxybutynin (DITROPAN) 5 MG tablet Take 1 tablet by mouth 2 times daily 180 tablet 3    Calcium Carbonate-Vit D-Min (CALCIUM 1200 PO) Take by mouth      Cholecalciferol (VITAMIN D3) 2000 units CAPS Take by mouth      vitamin C (ASCORBIC ACID) 500 MG tablet Take 500 mg by mouth daily      Glucose Blood ([employer] TEST VI) 1 Device by In Vitro route.  glucose blood VI test strips ([employer] TEST STRIPS) strip 1 each by In Vitro route daily. As needed. Current Facility-Administered Medications   Medication Dose Route Frequency Provider Last Rate Last Dose    betamethasone acetate-betamethasone sodium phosphate (CELESTONE) injection 12 mg  12 mg Intra-articular Once Narciso Treviño MD            Review of Systems  Constitutional: Negative for activity change, appetite change, chills, fatigue and fever. Respiratory: Negative for cough and shortness of breath. Cardiovascular: Negative for chest pain and palpitations. Musculoskeletal: Positive for arthralgias and gait problem. Skin: Negative for rash. Neurological: Negative for headaches. Past medical, surgical, family and social history were reviewed and updated with the patient. Objective:    /78 (Site: Left Upper Arm, Position: Sitting, Cuff Size: Large Adult)   Pulse 73   Wt 182 lb 12.8 oz (82.9 kg)   SpO2 94%   BMI 34.54 kg/m²   Weight: 182 lb 12.8 oz (82.9 kg)     BP Readings from Last 3 Encounters:   09/24/19 118/78   09/17/19 124/76   09/10/19 110/64     Wt Readings from Last 3 Encounters:   09/24/19 182 lb 12.8 oz (82.9 kg)   09/17/19 182 lb 9.6 oz (82.8 kg)   09/10/19 184 lb (83.5 kg)       Physical Exam  Constitutional: She is oriented to person, place, and time. She appears well-developed and well-nourished. No distress. HENT:   Head: Normocephalic and atraumatic. Eyes: Pupils are equal, round, and reactive to light.  Conjunctivae and EOM are normal.   Pulmonary/Chest: Effort normal.   Musculoskeletal:   Moderate crepitus of right knee. No erythema, effusion, skin breakdown, abnormal warmth of the knee joint. Neurological: She is alert and oriented to person, place, and time. No cranial nerve deficit. Skin: Skin is warm and dry. She is not diaphoretic. Psychiatric: She has a normal mood and affect. Her behavior is normal. Judgment and thought content normal.   Assessment/Plan    1. Primary osteoarthritis of both knees  - sodium hyaluronate (viscosup) injection 20 mg  - betamethasone acetate-betamethasone sodium phosphate (CELESTONE) injection 6 mg    - Pt tolerated Euflexa injection #3 of 3 to right knee today w/o any immediate complications. - Pt also tolerated steroid injection to left knee today without any complications. Pending response to steroid, could order euflexa for left knee.      Right Knee Injection Procedure Note  Pre-operative Diagnosis: right knee OA     Post-operative Diagnosis: same     Indications: Symptom relief from osteoarthritis     Anesthesia: 0.5% Marcaine     Procedure Details      Verbal consent was obtained for the procedure. The joint was prepped with Chloraprep and a 25 gauge needle was inserted into the lateral aspect of the joint from a lateral approach. 2 ml of 1% lidocaine with epinephrine was injected subcutaneously, then 2ml of euflexxa was injected into the joint. The needle was removed and the area cleansed and dressed.     Complications:  None; patient tolerated the procedure well. Left Knee Injection Procedure Note     Pre-operative Diagnosis: left knee OA     Post-operative Diagnosis: same     Indications: Symptom relief from osteoarthritis     Anesthesia: Lidocaine 1% with epinephrine without added sodium bicarbonate     Procedure Details      Verbal consent was obtained for the procedure. The joint was prepped with Chloraprep and a 22 gauge needle was inserted into the lateral aspect of the joint from a lateral approach.  2 ml 1% lidocaine, 2 mL of marcaine and 1 ml of betamethasone (CELESTONE) was then injected into the joint. The needle was removed and the area cleansed and dressed.     Complications:  None; patient tolerated the procedure well. No follow-ups on file.

## 2019-09-30 RX ORDER — OMEPRAZOLE 40 MG/1
CAPSULE, DELAYED RELEASE ORAL
Qty: 90 CAPSULE | Refills: 3 | Status: SHIPPED | OUTPATIENT
Start: 2019-09-30 | End: 2020-10-05

## 2019-10-08 DIAGNOSIS — M17.0 PRIMARY OSTEOARTHRITIS OF BOTH KNEES: ICD-10-CM

## 2019-10-08 DIAGNOSIS — M54.50 CHRONIC MIDLINE LOW BACK PAIN WITHOUT SCIATICA: ICD-10-CM

## 2019-10-08 DIAGNOSIS — G89.29 CHRONIC MIDLINE LOW BACK PAIN WITHOUT SCIATICA: ICD-10-CM

## 2019-10-08 RX ORDER — TRAMADOL HYDROCHLORIDE 50 MG/1
50 TABLET ORAL EVERY 8 HOURS PRN
Qty: 90 TABLET | Refills: 0 | Status: SHIPPED | OUTPATIENT
Start: 2019-10-08 | End: 2019-11-08 | Stop reason: SDUPTHER

## 2019-11-08 ENCOUNTER — OFFICE VISIT (OUTPATIENT)
Dept: FAMILY MEDICINE CLINIC | Age: 64
End: 2019-11-08
Payer: COMMERCIAL

## 2019-11-08 VITALS
SYSTOLIC BLOOD PRESSURE: 110 MMHG | HEART RATE: 72 BPM | DIASTOLIC BLOOD PRESSURE: 60 MMHG | WEIGHT: 174.2 LBS | OXYGEN SATURATION: 98 % | BODY MASS INDEX: 32.91 KG/M2

## 2019-11-08 DIAGNOSIS — M17.0 PRIMARY OSTEOARTHRITIS OF BOTH KNEES: ICD-10-CM

## 2019-11-08 DIAGNOSIS — M54.50 CHRONIC MIDLINE LOW BACK PAIN WITHOUT SCIATICA: Primary | ICD-10-CM

## 2019-11-08 DIAGNOSIS — G89.29 CHRONIC MIDLINE LOW BACK PAIN WITHOUT SCIATICA: Primary | ICD-10-CM

## 2019-11-08 DIAGNOSIS — E78.00 PURE HYPERCHOLESTEROLEMIA: ICD-10-CM

## 2019-11-08 DIAGNOSIS — J01.00 ACUTE NON-RECURRENT MAXILLARY SINUSITIS: ICD-10-CM

## 2019-11-08 PROCEDURE — 99214 OFFICE O/P EST MOD 30 MIN: CPT | Performed by: NURSE PRACTITIONER

## 2019-11-08 RX ORDER — CEFDINIR 300 MG/1
300 CAPSULE ORAL 2 TIMES DAILY
Qty: 14 CAPSULE | Refills: 0 | Status: SHIPPED | OUTPATIENT
Start: 2019-11-08 | End: 2019-11-15

## 2019-11-08 RX ORDER — PROCHLORPERAZINE MALEATE 10 MG
10 TABLET ORAL EVERY 6 HOURS PRN
COMMUNITY
End: 2020-05-01 | Stop reason: CLARIF

## 2019-11-08 RX ORDER — TRAMADOL HYDROCHLORIDE 50 MG/1
50 TABLET ORAL EVERY 8 HOURS PRN
Qty: 90 TABLET | Refills: 0 | Status: SHIPPED | OUTPATIENT
Start: 2019-11-08 | End: 2020-01-06

## 2019-11-08 RX ORDER — PRAVASTATIN SODIUM 20 MG
TABLET ORAL
Qty: 90 TABLET | Refills: 1 | Status: SHIPPED | OUTPATIENT
Start: 2019-11-08 | End: 2020-05-19

## 2019-11-08 RX ORDER — CAPECITABINE 500 MG/1
TABLET, FILM COATED ORAL
COMMUNITY
Start: 2019-11-07 | End: 2020-05-01 | Stop reason: CLARIF

## 2019-11-08 ASSESSMENT — ENCOUNTER SYMPTOMS
SINUS PAIN: 1
NAUSEA: 0
CONSTIPATION: 0
SORE THROAT: 1
CHEST TIGHTNESS: 0
COUGH: 1
WHEEZING: 1
SINUS PRESSURE: 1
DIARRHEA: 0
RHINORRHEA: 1
BACK PAIN: 1
SHORTNESS OF BREATH: 0

## 2019-11-08 ASSESSMENT — PATIENT HEALTH QUESTIONNAIRE - PHQ9
SUM OF ALL RESPONSES TO PHQ9 QUESTIONS 1 & 2: 4
2. FEELING DOWN, DEPRESSED OR HOPELESS: 2
1. LITTLE INTEREST OR PLEASURE IN DOING THINGS: 2
SUM OF ALL RESPONSES TO PHQ QUESTIONS 1-9: 4
SUM OF ALL RESPONSES TO PHQ QUESTIONS 1-9: 4

## 2019-11-13 RX ORDER — OXYBUTYNIN CHLORIDE 5 MG/1
5 TABLET ORAL 2 TIMES DAILY
Qty: 180 TABLET | Refills: 3 | Status: SHIPPED | OUTPATIENT
Start: 2019-11-13

## 2019-12-04 ENCOUNTER — HOSPITAL ENCOUNTER (OUTPATIENT)
Dept: WOMENS IMAGING | Age: 64
Discharge: HOME | End: 2019-12-04
Payer: COMMERCIAL

## 2019-12-04 DIAGNOSIS — Z85.3 PERSONAL HISTORY OF BREAST CANCER: ICD-10-CM

## 2019-12-04 DIAGNOSIS — Z85.3 ENCOUNTER FOR FOLLOW-UP SURVEILLANCE OF BREAST CANCER: ICD-10-CM

## 2019-12-04 DIAGNOSIS — Z08 ENCOUNTER FOR FOLLOW-UP SURVEILLANCE OF BREAST CANCER: ICD-10-CM

## 2019-12-04 PROCEDURE — G0279 TOMOSYNTHESIS, MAMMO: HCPCS

## 2020-01-06 RX ORDER — TRAMADOL HYDROCHLORIDE 50 MG/1
TABLET ORAL
Qty: 90 TABLET | Refills: 0 | Status: SHIPPED | OUTPATIENT
Start: 2020-01-06 | End: 2020-02-07 | Stop reason: SDUPTHER

## 2020-01-07 ENCOUNTER — TELEPHONE (OUTPATIENT)
Dept: RHEUMATOLOGY | Age: 65
End: 2020-01-07

## 2020-01-07 NOTE — TELEPHONE ENCOUNTER
PA submitted for OMEPRAZOLE 40 MG DR CAPSULES on CM. Leonila CABRERA Case ID: [id number] - Rx #: [id number]    Approved today   YRYSGR:[id number];YISSWL:[id number]; Review Type:Prior Auth; Coverage Start Date:12/08/2019; Coverage End Date:01/06/2021      Please advise patient of approval. Thanks,

## 2020-01-10 ENCOUNTER — HOSPITAL ENCOUNTER (OUTPATIENT)
Dept: PHYSICAL THERAPY | Age: 65
Setting detail: THERAPIES SERIES
Discharge: HOME | End: 2020-01-10
Payer: COMMERCIAL

## 2020-01-10 PROCEDURE — 97162 PT EVAL MOD COMPLEX 30 MIN: CPT

## 2020-01-10 PROCEDURE — 97535 SELF CARE MNGMENT TRAINING: CPT

## 2020-01-10 NOTE — FLOWSHEET NOTE
[address] [facility] Physical Therapy  Phone: [phone]   Fax: [phone]    Physical Therapy Daily Treatment Note  Date:  1/10/2020    Patient Name:  Wilfredo Coats    :  1955  MRN: [id number]  Medical/Treatment Diagnosis Information:  ·   R breast Cancer  · Treatment Diagnosis: R breast edema/fibrosis,  R shoulder decrease AROM  Insurance/Certification information:  PT Insurance Information: [employer] [place] PPO  Physician Information:  Referring Practitioner: Maria Esther Chance MD  Plan of care signed (Y/N): Faxed 1/10/2010    Date of Patient follow up with Physician:     Functional scale[de-identified] LLIS  Score 17  40% disability    Progress Note: [x]  Yes  []  No  Next due by: Visit #10       Latex Allergy:  [x]NO      []YES  Preferred Language for Healthcare:   [x]English       []other:    Visit # Insurance Allowable Date Range (if applicable)          Pain level:  3/10     SUBJECTIVE:  See eval    OBJECTIVE: See eval      RESTRICTIONS/PRECAUTIONS:  Breast ca    Exercises/Interventions:     Therapeutic Exercises (09882) Resistance / level Sets/sec Reps Notes                                                                  Therapeutic Activities (06870)                                          Neuromuscular Re-ed (55645)                                                 Manual Intervention ([id number])                                                     Pt. Education: 1/10  Pt educated on  Home management of symptoms including  anatomy,  Etiology,  Progression and potential complications.   educatged on s/s of cellulitis and need to contact MD>  Educated on beginning self MLD with fibrotic and fluid mobility across R breast to ipsil axilla x 30 min     -patient educated on diagnosis, prognosis and expectations for rehab  -all patient questions were answered    HEP instruction:  -patient provided with written and illustrated instructions for HEP (see scanned image in media manager)    Therapeutic Exercise and NMR EXR  [] (39621) Provided verbal/tactile cueing for activities related to strengthening, flexibility, endurance, ROM for improvements in  [] LE / Lumbar: LE, proximal hip, and core control with self care, mobility, lifting, ambulation. [] UE / Cervical: cervical, postural, scapular, scapulothoracic and UE control with self care, reaching, carrying, lifting, house/yardwork, driving, computer work.  [] (15329) Provided verbal/tactile cueing for activities related to improving balance, coordination, kinesthetic sense, posture, motor skill, proprioception to assist with   [] LE / lumbar: LE, proximal hip, and core control in self care, mobility, lifting, ambulation and eccentric single leg control. [] UE / cervical: cervical, scapular, scapulothoracic and UE control with self care, reaching, carrying, lifting, house/yardwork, driving, computer work.   [] (38304) Therapist is in constant attendance of 2 or more patients providing skilled therapy interventions, but not providing any significant amount of measurable one-on-one time to either patient, for improvements in  [] LE / lumbar: LE, proximal hip, and core control in self care, mobility, lifting, ambulation and eccentric single leg control. [] UE / cervical: cervical, scapular, scapulothoracic and UE control with self care, reaching, carrying, lifting, house/yardwork, driving, computer work.      NMR and Therapeutic Activities:    [] (42980 or 92979) Provided verbal/tactile cueing for activities related to improving balance, coordination, kinesthetic sense, posture, motor skill, proprioception and motor activation to allow for proper function of   [] LE: / Lumbar core, proximal hip and LE with self care and ADLs  [] UE / Cervical: cervical, postural, scapular, scapulothoracic and UE control with self care, carrying, lifting, driving, computer work.   [] (22202) Gait Re-education- Provided training and instruction to the Limitations/Impairments:  [x]Sleeping []Sitting               []Standing []Transfers        []Walking []Kneeling               []Stairs []Squatting / bending   []ADLs [x]Reaching  []Lifting  [x]Housework  []Driving [x]Job related tasks  []Sports/Recreation []Other:        ASSESSMENT:  See eval  Treatment/Activity Tolerance:  [x] Patient able to complete tx [] Patient limited by fatigue  [] Patient limited by pain  [] Patient limited by other medical complications  [] Other:     Prognosis: [x] Good [] Fair  [] Poor    Patient Requires Follow-up: [x] Yes  [] No    Plan for next treatment session:    PLAN: See eval. PT 2x / week for 4 weeks. [] Continue per plan of care [] Alter current plan (see comments)  [x] Plan of care initiated [] Hold pending MD visit [] Discharge    Electronically signed by: Jerrica Cummings PT, DPT    Note: If patient does not return for scheduled/ recommended follow up visits, his note will serve as a discharge from care along with most recent update on progress.

## 2020-01-10 NOTE — FLOWSHEET NOTE
ambulation. [] UE / Cervical: cervical, postural, scapular, scapulothoracic and UE control with self care, reaching, carrying, lifting, house/yardwork, driving, computer work.  [] (23485) Provided verbal/tactile cueing for activities related to improving balance, coordination, kinesthetic sense, posture, motor skill, proprioception to assist with   [] LE / lumbar: LE, proximal hip, and core control in self care, mobility, lifting, ambulation and eccentric single leg control. [] UE / cervical: cervical, scapular, scapulothoracic and UE control with self care, reaching, carrying, lifting, house/yardwork, driving, computer work.   [] (96975) Therapist is in constant attendance of 2 or more patients providing skilled therapy interventions, but not providing any significant amount of measurable one-on-one time to either patient, for improvements in  [] LE / lumbar: LE, proximal hip, and core control in self care, mobility, lifting, ambulation and eccentric single leg control. [] UE / cervical: cervical, scapular, scapulothoracic and UE control with self care, reaching, carrying, lifting, house/yardwork, driving, computer work.      NMR and Therapeutic Activities:    [] (06545 or 21066) Provided verbal/tactile cueing for activities related to improving balance, coordination, kinesthetic sense, posture, motor skill, proprioception and motor activation to allow for proper function of   [] LE: / Lumbar core, proximal hip and LE with self care and ADLs  [] UE / Cervical: cervical, postural, scapular, scapulothoracic and UE control with self care, carrying, lifting, driving, computer work.   [] (90591) Gait Re-education- Provided training and instruction to the patient for proper LE, core and proximal hip recruitment and positioning and eccentric body weight control with ambulation re-education including up and down stairs     Home Management Training / Self Care:  [] (71447) Provided self-care/home management training related to activities of daily living and compensatory training, and/or use of adaptive equipment for improvement with: ADLs and compensatory training, meal preparation, safety procedures and instruction in use of adaptive equipment, including bathing, grooming, dressing, personal hygiene, basic household cleaning and chores. Home Exercise Program:    [x] (44012) Reviewed/Progressed HEP activities related to strengthening, flexibility, endurance, ROM of   [] LE / Lumbar: core, proximal hip and LE for functional self-care, mobility, lifting and ambulation/stair navigation   [] UE / Cervical: cervical, postural, scapular, scapulothoracic and UE control with self care, reaching, carrying, lifting, house/yardwork, driving, computer work  [] (82046)Reviewed/Progressed HEP activities related to improving balance, coordination, kinesthetic sense, posture, motor skill, proprioception of   [] LE: core, proximal hip and LE for self care, mobility, lifting, and ambulation/stair navigation    [] UE / Cervical: cervical, postural,  scapular, scapulothoracic and UE control with self care, reaching, carrying, lifting, house/yardwork, driving, computer work    Manual Treatments:  PROM / STM / Oscillations-Mobs:  G-I, II, III, IV (PA's, Inf., Post.)  [] (49632) Provided manual therapy to mobilize LE, proximal hip and/or LS spine soft tissue/joints for the purpose of modulating pain, promoting relaxation,  increasing ROM, reducing/eliminating soft tissue swelling/inflammation/restriction, improving soft tissue extensibility and allowing for proper ROM for normal function with   [] LE / lumbar: self care, mobility, lifting and ambulation. [] UE / Cervical: self care, reaching, carrying, lifting, house/yardwork, driving, computer work.      Modalities:  [] (57856) Vasopneumatic compression: Utilized vasopneumatic compression to decrease edema / swelling for the purpose of improving mobility and quad tone / recruitment which will Other:     Prognosis: [x] Good [] Fair  [] Poor    Patient Requires Follow-up: [x] Yes  [] No    Plan for next treatment session:  Pt education re home MLD<  Manual therapy R shoulder for ROM,  HEP for strengtheing,   PLAN: See eval. PT 2x / week for 4 weeks. [] Continue per plan of care [] Alter current plan (see comments)  [x] Plan of care initiated [] Hold pending MD visit [] Discharge    Electronically signed by: Leticia Yousif PT, DPT    Note: If patient does not return for scheduled/ recommended follow up visits, his note will serve as a discharge from care along with most recent update on progress.

## 2020-01-10 NOTE — PROGRESS NOTES
Physical Therapy  Initial Assessment  Date: 1/10/2020  Patient Name: Suzanna Odell    Physical Therapy Re-Certification Plan of Care    Dear Referring Practitioner: Leonard Fiore MD,    We had the pleasure of evaluating  the following patient for physical therapy services at [address] [place] [address]. A summary of our findings can be found in the assessment below. Please do not not hesitate to contact me at the office phone number checked above. Thank you for the referral.     Physician Signature:________________________________Date:__________________  By signing above (or electronic signature), therapists plan is approved by physician      Functional  Assessment;   LLIS  Score 17, 40% disability    MRN: [id number]  : 1955     Treatment Diagnosis: R breast edema/fibrosis,  R shoulder decrease AROM    Restrictions    Br Ca,  Port placement in upper R chest,  Very superficial       Subjective   General  Chart Reviewed: Yes  Additional Pertinent Hx: Lumpectomy R breast 2019,   B elbow CTS and Ulnar Nerve sx in , ATS R  knee   Referring Practitioner: Leonard Fiore MD  Referral Date : 19  Follows Commands: Within Functional Limits  PT Visit Information  Onset Date: 19  PT Insurance Information: [employer] [place] PPO  Total # of Visits Approved: 12  Total # of Visits to Date: 1  Subjective  Subjective: Pt states R breast swelling seemed to begin in 2019, several months since her surgery,   states had mamogram  for diagnoses, of increase edema and p/surgical scarring.          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,         Objective      Upper Extremity ROM & Strength AROM Right  AROM Left Strength Right Strength Left   Date 4/3 4/3 4/3 4/3   Shoulder flex 145 148 4+/5 5/5   Shoulder  130 4+/5 5/5   Shoulder ER 90 85 5/5 5/5   Shoulder IR 70 70 5/5 5/5                               GIRTH MEASUREMENT  (Tape on skin along anterior arm)     Upper Extremity Right (cm) Left (cm) R (cm)   Date 4/3/19 4/3/19 1/463712   Axillary        55 Cm above distal end 3rd digit 39.2 39 37.7   50 Cm above distal end 3rd digit 34.5 33.0 33.8   45 Cm above distal end 3rd digit 31.8 31.5 31.5   30 Cm above distal end 3rd digit 24.5 24.8 23.7   20 Cm above distal end 3rd digit 17.8 17 17.4   Wrist - Styloids 17.0 17 17.6   Distal Rios Crease 19.9 19.5 20.0   Thumbs Proximal Phalanx 6.5 6.5 6.8                     Total Girth 191.2 188. 3 188.5            AROM RUE (degrees)  R Shoulder Flexion 0-180: 122  R Shoulder ABduction 0-180: 132  R Shoulder Int Rotation  0-70: 80  R Shoulder Ext Rotation 0-90: 70    Strength RUE  R Shoulder Flexion: 4+/5  R Shoulder ABduction: 4+/5  R Shoulder Internal Rotation: 5/5  R Shoulder External Rotation: 4+/5         Breast measurement:  Mid axillary to mid sternum  R  30 cm  L  35 cm       Pitting   [] none [] slightly [] moderate [] severe [x] brawny (does not indent)  Inferior aspect of R breast  Color    [x] dusky [] mottled [] red streaks [] other:  Skin Texture   [] rough  [] dry   [] moist  [] normal  [] hyperkaratosis [] hyperplasia  [] hyperpigmentation [] Elephantiasis  [] papillomas  [] Skin breakdown with lymphorrhea (weeping)  Skin Temperature   [] normal [] cool  [] uneven [x] warm [] hot  Edema Rebound*   [] quick [] slow [x] fibrotic tissue R inferior breast  *pressure applied x10 seconds    Signs of Constriction:  Condition of Nailbeds:   [] discolored [] red  [] white [] swollen     Skin Breakdown (indicate size, location and number) [] Yes [x] No  Comments:  Fistulas (an abnormal passageway) [] Yes  [x] No  Comments:  Tinea (fungus) [] Yes [x] No  Comments:  Papilloma (benign tumor arising from an epithelial layer)  [] Yes [x] No  Comments:   Fibrotic areas [x] Yes [] No  Comments:   Lymphorrhea (flow of lymph from a cut or ruptured lymph vessels): [] Yes [x] No  Comments:  Warts (a local growth of the outer layer of skin) [] Yes [x] No  Comments:  Ulcers  [] Yes [x] No  Comments:    SCARS:    STEMMER SIGN: [] positive [x] negative    Stage of Lymphedema   [] Latency stage/Lymphangiopathy (Stage 0 / Prestage / Subclinical stage):   · No swelling  · Reduced transport capacity (TC)  · \"Normal\" tissue consistency  [] Stage 1 (reversible stage):  · Edema is soft (pitting)  · No secondary tissue changes  · Elevation reduces swelling  [x] Stage 2 (spontaneously irreversible stage)  · Lyphostatic fibrosis  · Hardening of the tissue (no pitting)  · Stemmer sign positive   · Frequent infections   [] Stage 3 (lymphostatic elephantiasis)  · Extreme increase in volume and tissue texture with typical skin changes (papillomas, deep skinfolds, etc.)  · Stemmer sign positive    Classification for Lymphedema  [x] Mild: < 3 cm differential between affected limb and unaffected limb  [] Moderate:  3 - 5 cm differential between affected limb and unaffected limb   [] Severe:  5+ cm differential between affected limb and unaffected limb             Assessment   Conditions Requiring Skilled Therapeutic Intervention  Body structures, Functions, Activity limitations: Decreased functional mobility ; Decreased ROM; Increased pain  Assessment: Pt presents with fibrotic tissue R inferior breast with increased edema superior aspect and lateral aspect. She also demonstrates decreased AROM compared to her pre-surgical measurements. She will benefit from skilled physical therapy to address impairments and return to normal function with decrease pain.   Treatment Diagnosis: R breast edema/fibrosis,  R shoulder decrease AROM  Prognosis: Good  Decision Making: Medium Complexity  History: Pt with multiple comorbidities   and hx R br ca,    Clinical Presentation: stable edema,  increased fibrosis R breast  REQUIRES PT FOLLOW UP: Yes         Plan   Plan  Times per week: 2  Times per day: Daily  Plan weeks: 4  Specific instructions for Next Treatment: initiate MLD and educate pt on home program.     LLIS  Score 17  40% disability          Goals  LTG by 4 wks   Pt to report no pain in R breast to allow her to sleep uninterrupted  Pt Indep with home management of symptoms             Therapy Time   Individual Concurrent Group Co-treatment   Time In 1330         Time Out 1435         Minutes 65         Timed Code Treatment Minutes: [address], [address]

## 2020-01-13 ENCOUNTER — HOSPITAL ENCOUNTER (OUTPATIENT)
Dept: PHYSICAL THERAPY | Age: 65
Setting detail: THERAPIES SERIES
Discharge: HOME | End: 2020-01-13
Payer: COMMERCIAL

## 2020-01-13 PROCEDURE — 97140 MANUAL THERAPY 1/> REGIONS: CPT

## 2020-01-13 PROCEDURE — 97530 THERAPEUTIC ACTIVITIES: CPT

## 2020-01-17 ENCOUNTER — HOSPITAL ENCOUNTER (OUTPATIENT)
Dept: PHYSICAL THERAPY | Age: 65
Setting detail: THERAPIES SERIES
Discharge: HOME | End: 2020-01-17
Payer: COMMERCIAL

## 2020-01-17 PROCEDURE — 97140 MANUAL THERAPY 1/> REGIONS: CPT

## 2020-01-17 NOTE — FLOWSHEET NOTE
[facility] Physical Therapy  Phone: [phone]   Fax: [phone]    Physical Therapy Daily Treatment Note  Date:  2020    Patient Name:  Elver Burnham    :  1955  MRN: [id number]  Medical/Treatment Diagnosis Information:  ·   R breast Cancer     R breast edema/fibrosis,  R shoulder decrease AROM  Insurance/Certification information:    [employer] [place] PPO  Physician Information:    Sin Spears MD  Plan of care signed (Y/N): Faxed 1/10/2010    Date of Patient follow up with Physician:     Functional scale[de-identified] LLIS  Score 17  40% disability    Progress Note: []  Yes  [x]  No  Next due by: Visit #10       Latex Allergy:  [x]NO      []YES  Preferred Language for Healthcare:   [x]English       []other:    Visit # Insurance Allowable Date Range (if applicable)   3/8       Pain level:  2/10     SUBJECTIVE:  :  Pt with some tenderness R breast; states she had some soreness last night however is feeling some better today. OBJECTIVE: :  Pt still with fibrotic areas inferior aspect R breast however states it is much softer than prior to initiation of PT. RESTRICTIONS/PRECAUTIONS:  Breast ca    Exercises/Interventions:     Therapeutic Exercises (70213) Resistance / level Sets/sec Reps Notes                                                                  Therapeutic Activities (44184)                                          Neuromuscular Re-ed (94344)                                          Manual Intervention (98935)       MLD R breast to the ipsilateral groin; pathway clearance; node clearance  PROM R shoulder with stretch at end range for all directions  25 min                                            Pt. Education: :  Educated on the importance of compression with air travel and when driving long distances. Provided tensoshape for use until compression sleeve is purchased.     1/10  Pt educated on  Home management of symptoms including 97402) Provided verbal/tactile cueing for activities related to improving balance, coordination, kinesthetic sense, posture, motor skill, proprioception and motor activation to allow for proper function of   [] LE: / Lumbar core, proximal hip and LE with self care and ADLs  [x] UE / Cervical: cervical, postural, scapular, scapulothoracic and UE control with self care, carrying, lifting, driving, computer work.   [] (76838) Gait Re-education- Provided training and instruction to the patient for proper LE, core and proximal hip recruitment and positioning and eccentric body weight control with ambulation re-education including up and down stairs     Home Management Training / Self Care:  [x] (99080) Provided self-care/home management training related to activities of daily living and compensatory training, and/or use of adaptive equipment for improvement with: ADLs and compensatory training, meal preparation, safety procedures and instruction in use of adaptive equipment, including bathing, grooming, dressing, personal hygiene, basic household cleaning and chores.      Home Exercise Program:    [] (13954) Reviewed/Progressed HEP activities related to strengthening, flexibility, endurance, ROM of   [] LE / Lumbar: core, proximal hip and LE for functional self-care, mobility, lifting and ambulation/stair navigation   [] UE / Cervical: cervical, postural, scapular, scapulothoracic and UE control with self care, reaching, carrying, lifting, house/yardwork, driving, computer work  [] (82178)Reviewed/Progressed HEP activities related to improving balance, coordination, kinesthetic sense, posture, motor skill, proprioception of   [] LE: core, proximal hip and LE for self care, mobility, lifting, and ambulation/stair navigation    [] UE / Cervical: cervical, postural,  scapular, scapulothoracic and UE control with self care, reaching, carrying, lifting, house/yardwork, driving, computer work    Manual Treatments:  PROM / STM / Update:  [] Patient is progressing as expected towards functional goals listed. [] Progression is slowed due to complexities/Impairments listed. [] Progression has been slowed due to co-morbidities. [x] Plan just implemented, too soon to assess goals progression <30days   [] Goals require adjustment due to lack of progress  [] Patient is not progressing as expected and requires additional follow up with physician  [] Other    Persisting Functional Limitations/Impairments:  [x]Sleeping []Sitting               []Standing []Transfers        []Walking []Kneeling               []Stairs []Squatting / bending   []ADLs [x]Reaching  []Lifting  [x]Housework  []Driving [x]Job related tasks  []Sports/Recreation []Other:        ASSESSMENT:  Pt tolerating treatment well; reports decreased fibrotic areas in breast  Treatment/Activity Tolerance:  [x] Patient able to complete tx [] Patient limited by fatigue  [] Patient limited by pain  [] Patient limited by other medical complications  [] Other:     Prognosis: [x] Good [] Fair  [] Poor    Patient Requires Follow-up: [x] Yes  [] No    Plan for next treatment session: Continue with MLD as tolerated. PLAN: See eval. PT 2x / week for 4 weeks. [x] Continue per plan of care [] Alter current plan (see comments)  [] Plan of care initiated [] Hold pending MD visit [] Discharge    Electronically signed by: Shahid Lovell PT, DPT    Note: If patient does not return for scheduled/ recommended follow up visits, his note will serve as a discharge from care along with most recent update on progress.

## 2020-01-20 ENCOUNTER — HOSPITAL ENCOUNTER (OUTPATIENT)
Dept: PHYSICAL THERAPY | Age: 65
Setting detail: THERAPIES SERIES
Discharge: HOME | End: 2020-01-20
Payer: COMMERCIAL

## 2020-01-20 PROCEDURE — 97140 MANUAL THERAPY 1/> REGIONS: CPT

## 2020-01-20 NOTE — FLOWSHEET NOTE
[address] [facility] Physical Therapy  Phone: [phone]   Fax: [phone]    Physical Therapy Daily Treatment Note  Date:  2020    Patient Name:  Jorje Kothari    :  1955  MRN: [id number]  Medical/Treatment Diagnosis Information:  ·   R breast Cancer     R breast edema/fibrosis,  R shoulder decrease AROM  Insurance/Certification information:    [employer] [place] PPO  Physician Information:    Jalen Chery MD  Plan of care signed (Y/N): Faxed 1/10/2010    Date of Patient follow up with Physician:     Functional scale[de-identified] LLIS  Score 17  40% disability    Progress Note: []  Yes  [x]  No  Next due by: Visit #10       Latex Allergy:  [x]NO      []YES  Preferred Language for Healthcare:   [x]English       []other:    Visit # Insurance Allowable Date Range (if applicable)          Pain level:  2/10     SUBJECTIVE:  : says that she is noticing a lot of improvement with her breast since starting therapy; less painful and swollen  :  Pt with some tenderness R breast; states she had some soreness last night however is feeling some better today. OBJECTIVE: :  Pt still with fibrotic areas inferior aspect R breast however states it is much softer than prior to initiation of PT. RESTRICTIONS/PRECAUTIONS:  Breast ca    Exercises/Interventions:     Therapeutic Exercises (47275) Resistance / level Sets/sec Reps Notes                                                                  Therapeutic Activities (49524)                                          Neuromuscular Re-ed (12810)                                          Manual Intervention (42809)       MLD R breast to the ipsilateral groin; pathway clearance; node clearance  PROM R shoulder with stretch at end range for all directions  30  min                                            Pt. Education: :  Educated on the importance of compression with air travel and when driving long distances.   Provided tensoshape for use until compression sleeve is purchased. 1/10  Pt educated on  Home management of symptoms including  anatomy,  Etiology,  Progression and potential complications. educatged on s/s of cellulitis and need to contact MD>  Educated on beginning self MLD with fibrotic and fluid mobility across R breast to ipsil axilla x 30 min     -patient educated on diagnosis, prognosis and expectations for rehab  -all patient questions were answered    HEP instruction: 1/17:  No new additions this date  -patient provided with written and illustrated instructions for HEP (see scanned image in )    Therapeutic Exercise and NMR EXR  [] (34925) Provided verbal/tactile cueing for activities related to strengthening, flexibility, endurance, ROM for improvements in  [] LE / Lumbar: LE, proximal hip, and core control with self care, mobility, lifting, ambulation. [] UE / Cervical: cervical, postural, scapular, scapulothoracic and UE control with self care, reaching, carrying, lifting, house/yardwork, driving, computer work.  [] (80391) Provided verbal/tactile cueing for activities related to improving balance, coordination, kinesthetic sense, posture, motor skill, proprioception to assist with   [] LE / lumbar: LE, proximal hip, and core control in self care, mobility, lifting, ambulation and eccentric single leg control. [] UE / cervical: cervical, scapular, scapulothoracic and UE control with self care, reaching, carrying, lifting, house/yardwork, driving, computer work.   [] (71566) Therapist is in constant attendance of 2 or more patients providing skilled therapy interventions, but not providing any significant amount of measurable one-on-one time to either patient, for improvements in  [] LE / lumbar: LE, proximal hip, and core control in self care, mobility, lifting, ambulation and eccentric single leg control.    [] UE / cervical: cervical, scapular, scapulothoracic and UE control with self care, reaching, carrying, lifting, house/yardwork, driving, computer work. NMR and Therapeutic Activities:    [] (22272 or 11792) Provided verbal/tactile cueing for activities related to improving balance, coordination, kinesthetic sense, posture, motor skill, proprioception and motor activation to allow for proper function of   [] LE: / Lumbar core, proximal hip and LE with self care and ADLs  [] UE / Cervical: cervical, postural, scapular, scapulothoracic and UE control with self care, carrying, lifting, driving, computer work.   [] (33557) Gait Re-education- Provided training and instruction to the patient for proper LE, core and proximal hip recruitment and positioning and eccentric body weight control with ambulation re-education including up and down stairs     Home Management Training / Self Care:  [] (12556) Provided self-care/home management training related to activities of daily living and compensatory training, and/or use of adaptive equipment for improvement with: ADLs and compensatory training, meal preparation, safety procedures and instruction in use of adaptive equipment, including bathing, grooming, dressing, personal hygiene, basic household cleaning and chores.      Home Exercise Program:    [] (18090) Reviewed/Progressed HEP activities related to strengthening, flexibility, endurance, ROM of   [] LE / Lumbar: core, proximal hip and LE for functional self-care, mobility, lifting and ambulation/stair navigation   [] UE / Cervical: cervical, postural, scapular, scapulothoracic and UE control with self care, reaching, carrying, lifting, house/yardwork, driving, computer work  [] (15732)Reviewed/Progressed HEP activities related to improving balance, coordination, kinesthetic sense, posture, motor skill, proprioception of   [] LE: core, proximal hip and LE for self care, mobility, lifting, and ambulation/stair navigation    [] UE / Cervical: cervical, postural,  scapular, scapulothoracic and UE control complications and lymphedema prognosis and outcomes.       Overall Progression Towards Functional goals/ Treatment Progress Update:  [] Patient is progressing as expected towards functional goals listed. [] Progression is slowed due to complexities/Impairments listed. [] Progression has been slowed due to co-morbidities. [x] Plan just implemented, too soon to assess goals progression <30days   [] Goals require adjustment due to lack of progress  [] Patient is not progressing as expected and requires additional follow up with physician  [] Other    Persisting Functional Limitations/Impairments:  [x]Sleeping []Sitting               []Standing []Transfers        []Walking []Kneeling               []Stairs []Squatting / bending   []ADLs [x]Reaching  []Lifting  [x]Housework  []Driving [x]Job related tasks  []Sports/Recreation []Other:        ASSESSMENT:  Pt tolerating treatment well; reports decreased fibrotic areas in breast  Treatment/Activity Tolerance:  [x] Patient able to complete tx [] Patient limited by fatigue  [] Patient limited by pain  [] Patient limited by other medical complications  [] Other:     Prognosis: [x] Good [] Fair  [] Poor    Patient Requires Follow-up: [x] Yes  [] No    Plan for next treatment session: Continue with MLD as tolerated. PLAN: See eval. PT 2x / week for 4 weeks. [x] Continue per plan of care [] Alter current plan (see comments)  [] Plan of care initiated [] Hold pending MD visit [] Discharge    Electronically signed by: Isatu Ortiz PT, DPT    Note: If patient does not return for scheduled/ recommended follow up visits, his note will serve as a discharge from care along with most recent update on progress.

## 2020-01-23 ENCOUNTER — HOSPITAL ENCOUNTER (OUTPATIENT)
Dept: PHYSICAL THERAPY | Age: 65
Setting detail: THERAPIES SERIES
Discharge: HOME | End: 2020-01-23
Payer: COMMERCIAL

## 2020-01-23 PROCEDURE — 97140 MANUAL THERAPY 1/> REGIONS: CPT

## 2020-01-23 NOTE — FLOWSHEET NOTE
30  min                                            Pt. Education: 1/17:  Educated on the importance of compression with air travel and when driving long distances. Provided tensoshape for use until compression sleeve is purchased. 1/10  Pt educated on  Home management of symptoms including  anatomy,  Etiology,  Progression and potential complications. educatged on s/s of cellulitis and need to contact MD>  Educated on beginning self MLD with fibrotic and fluid mobility across R breast to ipsil axilla x 30 min     -patient educated on diagnosis, prognosis and expectations for rehab  -all patient questions were answered    HEP instruction: 1/17:  No new additions this date  -patient provided with written and illustrated instructions for HEP (see scanned image in )    Therapeutic Exercise and NMR EXR  [] (17936) Provided verbal/tactile cueing for activities related to strengthening, flexibility, endurance, ROM for improvements in  [] LE / Lumbar: LE, proximal hip, and core control with self care, mobility, lifting, ambulation. [] UE / Cervical: cervical, postural, scapular, scapulothoracic and UE control with self care, reaching, carrying, lifting, house/yardwork, driving, computer work.  [] (00685) Provided verbal/tactile cueing for activities related to improving balance, coordination, kinesthetic sense, posture, motor skill, proprioception to assist with   [] LE / lumbar: LE, proximal hip, and core control in self care, mobility, lifting, ambulation and eccentric single leg control.    [] UE / cervical: cervical, scapular, scapulothoracic and UE control with self care, reaching, carrying, lifting, house/yardwork, driving, computer work.   [] (52672) Therapist is in constant attendance of 2 or more patients providing skilled therapy interventions, but not providing any significant amount of measurable one-on-one time to either patient, for improvements in  [] LE / lumbar: LE, proximal hip, and core control in self care, mobility, lifting, ambulation and eccentric single leg control. [] UE / cervical: cervical, scapular, scapulothoracic and UE control with self care, reaching, carrying, lifting, house/yardwork, driving, computer work. NMR and Therapeutic Activities:    [] (62122 or 66547) Provided verbal/tactile cueing for activities related to improving balance, coordination, kinesthetic sense, posture, motor skill, proprioception and motor activation to allow for proper function of   [] LE: / Lumbar core, proximal hip and LE with self care and ADLs  [] UE / Cervical: cervical, postural, scapular, scapulothoracic and UE control with self care, carrying, lifting, driving, computer work.   [] (32217) Gait Re-education- Provided training and instruction to the patient for proper LE, core and proximal hip recruitment and positioning and eccentric body weight control with ambulation re-education including up and down stairs     Home Management Training / Self Care:  [] (86198) Provided self-care/home management training related to activities of daily living and compensatory training, and/or use of adaptive equipment for improvement with: ADLs and compensatory training, meal preparation, safety procedures and instruction in use of adaptive equipment, including bathing, grooming, dressing, personal hygiene, basic household cleaning and chores.      Home Exercise Program:    [] (83250) Reviewed/Progressed HEP activities related to strengthening, flexibility, endurance, ROM of   [] LE / Lumbar: core, proximal hip and LE for functional self-care, mobility, lifting and ambulation/stair navigation   [] UE / Cervical: cervical, postural, scapular, scapulothoracic and UE control with self care, reaching, carrying, lifting, house/yardwork, driving, computer work  [] (68527)Reviewed/Progressed HEP activities related to improving balance, coordination, kinesthetic sense, posture, motor skill, proprioception of   [] LE: core, proximal hip and LE for self care, mobility, lifting, and ambulation/stair navigation    [] UE / Cervical: cervical, postural,  scapular, scapulothoracic and UE control with self care, reaching, carrying, lifting, house/yardwork, driving, computer work    Manual Treatments:  PROM / STM / Oscillations-Mobs:  G-I, II, III, IV (PA's, Inf., Post.)  [x] (06783) Provided manual therapy to mobilize LE, proximal hip and/or LS spine soft tissue/joints for the purpose of modulating pain, promoting relaxation,  increasing ROM, reducing/eliminating soft tissue swelling/inflammation/restriction, improving soft tissue extensibility and allowing for proper ROM for normal function with   [] LE / lumbar: self care, mobility, lifting and ambulation. [x] UE / Cervical: self care, reaching, carrying, lifting, house/yardwork, driving, computer work. Modalities:  [] (40296) Vasopneumatic compression: Utilized vasopneumatic compression to decrease edema / swelling for the purpose of improving mobility and quad tone / recruitment which will allow for increased overall function including but not limited to self-care, transfers, ambulation, and ascending / descending stairs.        Modalities:  1/17:  None this date    Charges:  Timed Code Treatment Minutes: 30   Total Treatment Minutes: 30     [] EVAL - LOW (61334)   [] EVAL - MOD (79975)  [] EVAL - HIGH (71047)  [] RE-EVAL (34916)  [] TE (07166) x      [] Ionto  [] NMR (89206) x      [] Vaso  [x] Manual (98296) x 2      [] Ultrasound  [] TA x      [] Mech Traction (41786)  [] Gait Training x     [] ES (un) (73499):   [] Aquatic therapy x   [] Other:  Home management   [] Group:       LTG by 4 wks   Pt to report no pain in R breast to allow her to sleep uninterrupted  Pt Indep with home management of symptoms       GOALS:      Short term goals  Time Frame for Short term goals: Pt educated on Post operative procedures,  educated on s/s of lymphedema/complications,  methods to

## 2020-01-27 ENCOUNTER — HOSPITAL ENCOUNTER (OUTPATIENT)
Dept: PHYSICAL THERAPY | Age: 65
Setting detail: THERAPIES SERIES
Discharge: HOME | End: 2020-01-27
Payer: COMMERCIAL

## 2020-01-27 PROCEDURE — 97016 VASOPNEUMATIC DEVICE THERAPY: CPT

## 2020-01-27 PROCEDURE — 97140 MANUAL THERAPY 1/> REGIONS: CPT

## 2020-01-27 NOTE — FLOWSHEET NOTE
[address] [facility] Physical Therapy  Phone: [phone]   Fax: [phone]    Physical Therapy Daily Treatment Note  Date:  2020     Patient Name:  Cris Ruiz    :  1955  MRN: [id number]    Physical Therapy Re-Certification Plan of Care    Dear  , Flor Chavez,      We had the pleasure of treating the following patient for physical therapy services at [address] [place] [address]. A summary of our findings can be found in the updated assessment below. This includes our plan of care. If you have any questions or concerns regarding these findings, please do not hesitate to contact me at the office phone number checked above. Thank you for the referral.     Physician Signature:________________________________Date:__________________  By signing above (or electronic signature), therapists plan is approved by physician          Overall Response to Treatment:   [x]Patient is responding well to treatment and improvement is noted with regards  to goals   []Patient should continue to improve in reasonable time if they continue HEP   []Patient has plateaued and is no longer responding to skilled PT intervention    []Patient is getting worse and would benefit from return to referring MD   []Patient unable to adhere to initial POC   []Other:     Date range of Visits: 1/10/2020 - 20  Total Visits: 7/8      Recommendation:    []Continue PT 2x / wk for 4 weeks. []Hold PT, pending MD visit      Medical/Treatment Diagnosis Information:  ·   R breast Cancer     R breast edema/fibrosis,  R shoulder decrease AROM  Insurance/Certification information:    [employer] [place] PPO  Physician Information:    Devin Guevara MD  Plan of care signed (Y/N):   Faxed 1/10/2010    Date of Patient follow up with Physician:     Functional scale[de-identified] LLIS  Score 17  40% disability    Progress Note: []  Yes  [x]  No  Next due by: Visit #10       Latex Allergy:  [x]NO      []YES  Preferred Language coordination, kinesthetic sense, posture, motor skill, proprioception of   [] LE: core, proximal hip and LE for self care, mobility, lifting, and ambulation/stair navigation    [] UE / Cervical: cervical, postural,  scapular, scapulothoracic and UE control with self care, reaching, carrying, lifting, house/yardwork, driving, computer work    Manual Treatments:  PROM / STM / Oscillations-Mobs:  G-I, II, III, IV (PA's, Inf., Post.)  [x] (88271) Provided manual therapy to mobilize LE, proximal hip and/or LS spine soft tissue/joints for the purpose of modulating pain, promoting relaxation,  increasing ROM, reducing/eliminating soft tissue swelling/inflammation/restriction, improving soft tissue extensibility and allowing for proper ROM for normal function with   [] LE / lumbar: self care, mobility, lifting and ambulation. [x] UE / Cervical: self care, reaching, carrying, lifting, house/yardwork, driving, computer work. Modalities:  [] (84101) Vasopneumatic compression: Utilized vasopneumatic compression to decrease edema / swelling for the purpose of improving mobility and quad tone / recruitment which will allow for increased overall function including but not limited to self-care, transfers, ambulation, and ascending / descending stairs.        Modalities:  1/17:  None this date    Charges:  Timed Code Treatment Minutes: 33   Total Treatment Minutes: 33     [] EVAL - LOW (99972)   [] EVAL - MOD (21257)  [] EVAL - HIGH (85241)  [] RE-EVAL (85677)  [] TE (07076) x      [] Ionto  [] NMR (82708) x      [] Vaso  [x] Manual (87151) x 2      [] Ultrasound  [] TA x      [] Mech Traction (53096)  [] Gait Training x     [] ES (un) (91961):   [] Aquatic therapy x   [] Other:  Home management   [] Group:       LTG by 4 wks   Pt to report no pain in R breast to allow her to sleep uninterrupted-  Not met   Pt Indep with home management of symptoms   Progressing       GOALS:      Short term goals  Time Frame for Short term goals: Pt educated on Post operative procedures,  educated on s/s of lymphedema/complications,  methods to decrease risk. need for compression sleeve post operatively due to maximum lymph node removal anticipated. Patient Goals   Patient goals : pt  goal  is to understand complications and lymphedema prognosis and outcomes.       Overall Progression Towards Functional goals/ Treatment Progress Update:  [] Patient is progressing as expected towards functional goals listed. [] Progression is slowed due to complexities/Impairments listed. [] Progression has been slowed due to co-morbidities. [x] Plan just implemented, too soon to assess goals progression <30days   [] Goals require adjustment due to lack of progress  [] Patient is not progressing as expected and requires additional follow up with physician  [] Other    Persisting Functional Limitations/Impairments:  [x]Sleeping []Sitting               []Standing []Transfers        []Walking []Kneeling               []Stairs []Squatting / bending   []ADLs [x]Reaching  []Lifting  [x]Housework  []Driving [x]Job related tasks  []Sports/Recreation []Other:        ASSESSMENT:  Pt tolerating treatment well; reports decreased fibrotic areas in breast  Treatment/Activity Tolerance:  [x] Patient able to complete tx [] Patient limited by fatigue  [] Patient limited by pain  [] Patient limited by other medical complications  [] Other:     Prognosis: [x] Good [] Fair  [] Poor    Patient Requires Follow-up: [x] Yes  [] No    Plan for next treatment session: Continue with MLD as tolerated. PLAN: See junior. PT 2x / week for 4 weeks.    [x] Continue per plan of care [] Alter current plan (see comments)  [] Plan of care initiated [] Hold pending MD visit [] Discharge    Electronically signed by: Chirag Blount PT, DPT    Note: If patient does not return for scheduled/ recommended follow up visits, his note will serve as a discharge from care along with most recent update on progress.

## 2020-01-30 ENCOUNTER — HOSPITAL ENCOUNTER (OUTPATIENT)
Dept: PHYSICAL THERAPY | Age: 65
Setting detail: THERAPIES SERIES
Discharge: HOME | End: 2020-01-30
Payer: COMMERCIAL

## 2020-01-30 PROCEDURE — 97140 MANUAL THERAPY 1/> REGIONS: CPT

## 2020-01-30 NOTE — FLOWSHEET NOTE
ambulation re-education including up and down stairs     Home Management Training / Self Care:  [] (46516) Provided self-care/home management training related to activities of daily living and compensatory training, and/or use of adaptive equipment for improvement with: ADLs and compensatory training, meal preparation, safety procedures and instruction in use of adaptive equipment, including bathing, grooming, dressing, personal hygiene, basic household cleaning and chores. Home Exercise Program:    [] (36344) Reviewed/Progressed HEP activities related to strengthening, flexibility, endurance, ROM of   [] LE / Lumbar: core, proximal hip and LE for functional self-care, mobility, lifting and ambulation/stair navigation   [] UE / Cervical: cervical, postural, scapular, scapulothoracic and UE control with self care, reaching, carrying, lifting, house/yardwork, driving, computer work  [] (37024)Reviewed/Progressed HEP activities related to improving balance, coordination, kinesthetic sense, posture, motor skill, proprioception of   [] LE: core, proximal hip and LE for self care, mobility, lifting, and ambulation/stair navigation    [] UE / Cervical: cervical, postural,  scapular, scapulothoracic and UE control with self care, reaching, carrying, lifting, house/yardwork, driving, computer work    Manual Treatments:  PROM / STM / Oscillations-Mobs:  G-I, II, III, IV (PA's, Inf., Post.)  [x] (98748) Provided manual therapy to mobilize LE, proximal hip and/or LS spine soft tissue/joints for the purpose of modulating pain, promoting relaxation,  increasing ROM, reducing/eliminating soft tissue swelling/inflammation/restriction, improving soft tissue extensibility and allowing for proper ROM for normal function with   [] LE / lumbar: self care, mobility, lifting and ambulation. [x] UE / Cervical: self care, reaching, carrying, lifting, house/yardwork, driving, computer work.      Modalities:  [] (88881) Vasopneumatic compression: Utilized vasopneumatic compression to decrease edema / swelling for the purpose of improving mobility and quad tone / recruitment which will allow for increased overall function including but not limited to self-care, transfers, ambulation, and ascending / descending stairs. Modalities:  1/17:  None this date    Charges:  Timed Code Treatment Minutes: 35   Total Treatment Minutes: 35     [] EVAL - LOW (59124)   [] EVAL - MOD (49030)  [] EVAL - HIGH (80148)  [] RE-EVAL (25227)  [] TE (14711) x      [] Ionto  [] NMR (55364) x      [] Vaso  [x] Manual (72595) x 2      [] Ultrasound  [] TA x      [] Mech Traction (68427)  [] Gait Training x     [] ES (un) (01839):   [] Aquatic therapy x   [] Other:  Home management   [] Group:       LTG by 4 wks   Pt to report no pain in R breast to allow her to sleep uninterrupted-  Not met   Pt Indep with home management of symptoms   Progressing       GOALS:      Short term goals  Time Frame for Short term goals: Pt educated on Post operative procedures,  educated on s/s of lymphedema/complications,  methods to decrease risk. need for compression sleeve post operatively due to maximum lymph node removal anticipated. Patient Goals   Patient goals : pt  goal  is to understand complications and lymphedema prognosis and outcomes.       Overall Progression Towards Functional goals/ Treatment Progress Update:  [] Patient is progressing as expected towards functional goals listed. [] Progression is slowed due to complexities/Impairments listed. [] Progression has been slowed due to co-morbidities.   [x] Plan just implemented, too soon to assess goals progression <30days   [] Goals require adjustment due to lack of progress  [] Patient is not progressing as expected and requires additional follow up with physician  [] Other    Persisting Functional

## 2020-02-03 ENCOUNTER — HOSPITAL ENCOUNTER (OUTPATIENT)
Dept: PHYSICAL THERAPY | Age: 65
Setting detail: THERAPIES SERIES
Discharge: HOME | End: 2020-02-03
Payer: COMMERCIAL

## 2020-02-03 PROCEDURE — 97140 MANUAL THERAPY 1/> REGIONS: CPT

## 2020-02-03 NOTE — FLOWSHEET NOTE
[address] [facility]  Phone: [phone]   Fax: [phone]    Physical Therapy Daily Treatment Note  Date:  2/3/2020     Patient Name:  Dayday Mata    :  1955  MRN: [id number]    Physical Therapy Re-Certification Plan of Care    Medical/Treatment Diagnosis Information:  ·   R breast Cancer     R breast edema/fibrosis,  R shoulder decrease AROM  Insurance/Certification information:    [facility]  Physician Information:    Ganesh Kelly MD  Plan of care signed (Y/N): Faxed 1/10/2010    Date of Patient follow up with Physician:     Functional scale[de-identified] LLIS  Score 17  40% disability    Progress Note: []  Yes  [x]  No  Next due by: Visit #10       Latex Allergy:  [x]NO      []YES  Preferred Language for Healthcare:   [x]English       []other:    Visit # Insurance Allowable Date Range (if applicable)   ,        Pain level:  0/10 Pain is intermittent      SUBJECTIVE:  2/3,   Pt states that the denseness of the R inferior breast is improved with the use of the gray foam.   pt states that she has noticed significant improvement in edema lateral breast with use of gray foam pad,        Pt stated having general achiness in lateal R breast today    Pt states still noticing improvement,  Denseness in lower aspect of breast is better,  But  on upper breast.  States is now able to reach     : says that she is noticing a lot of improvement with her breast since starting therapy; less painful and swollen  :  Pt with some tenderness R breast; states she had some soreness last night however is feeling some better today. OBJECTIVE:    Prepared gray foam for added compression to inferior R breast for fibrotic tissue.       Prepared gray foam for added compression to R lateral breast.     GIRTH MEASUREMENT  (Tape on skin along anterior arm)     Upper Extremity Right (cm) Left (cm)  R (cm) R (cm)   Date 4/3/19 4/3/19 patient for proper LE, core and proximal hip recruitment and positioning and eccentric body weight control with ambulation re-education including up and down stairs     Home Management Training / Self Care:  [] (33656) Provided self-care/home management training related to activities of daily living and compensatory training, and/or use of adaptive equipment for improvement with: ADLs and compensatory training, meal preparation, safety procedures and instruction in use of adaptive equipment, including bathing, grooming, dressing, personal hygiene, basic household cleaning and chores. Home Exercise Program:    [] (34197) Reviewed/Progressed HEP activities related to strengthening, flexibility, endurance, ROM of   [] LE / Lumbar: core, proximal hip and LE for functional self-care, mobility, lifting and ambulation/stair navigation   [] UE / Cervical: cervical, postural, scapular, scapulothoracic and UE control with self care, reaching, carrying, lifting, house/yardwork, driving, computer work  [] (51924)Reviewed/Progressed HEP activities related to improving balance, coordination, kinesthetic sense, posture, motor skill, proprioception of   [] LE: core, proximal hip and LE for self care, mobility, lifting, and ambulation/stair navigation    [] UE / Cervical: cervical, postural,  scapular, scapulothoracic and UE control with self care, reaching, carrying, lifting, house/yardwork, driving, computer work    Manual Treatments:  PROM / STM / Oscillations-Mobs:  G-I, II, III, IV (PA's, Inf., Post.)  [x] (62804) Provided manual therapy to mobilize LE, proximal hip and/or LS spine soft tissue/joints for the purpose of modulating pain, promoting relaxation,  increasing ROM, reducing/eliminating soft tissue swelling/inflammation/restriction, improving soft tissue extensibility and allowing for proper ROM for normal function with   [] LE / lumbar: self care, mobility, lifting and ambulation.     [x] UE / Cervical: self care, reaching, carrying, lifting, house/yardwork, driving, computer work. Modalities:  [] (87277) Vasopneumatic compression: Utilized vasopneumatic compression to decrease edema / swelling for the purpose of improving mobility and quad tone / recruitment which will allow for increased overall function including but not limited to self-care, transfers, ambulation, and ascending / descending stairs. Modalities:  1/17:  None this date    Charges:  Timed Code Treatment Minutes: 35   Total Treatment Minutes: 35     [] EVAL - LOW (47694)   [] EVAL - MOD (76230)  [] EVAL - HIGH (69055)  [] RE-EVAL (48946)  [] TE (01371) x      [] Ionto  [] NMR (46722) x      [] Vaso  [x] Manual (13069) x 2      [] Ultrasound  [] TA x      [] Mech Traction (21131)  [] Gait Training x     [] ES (un) (55255):   [] Aquatic therapy x   [] Other:  Home management   [] Group:       LTG by 4 wks   Pt to report no pain in R breast to allow her to sleep uninterrupted-  Not met   Pt Indep with home management of symptoms   Progressing       GOALS:      Short term goals  Time Frame for Short term goals: Pt educated on Post operative procedures,  educated on s/s of lymphedema/complications,  methods to decrease risk. need for compression sleeve post operatively due to maximum lymph node removal anticipated. Patient Goals   Patient goals : pt  goal  is to understand complications and lymphedema prognosis and outcomes.       Overall Progression Towards Functional goals/ Treatment Progress Update:  [] Patient is progressing as expected towards functional goals listed. [] Progression is slowed due to complexities/Impairments listed. [] Progression has been slowed due to co-morbidities.   [x] Plan just implemented, too soon to assess goals progression <30days   [] Goals require adjustment due to lack of progress  [] Patient is not progressing as expected and requires additional follow up with physician  [] Other    Persisting Functional

## 2020-02-06 ENCOUNTER — HOSPITAL ENCOUNTER (OUTPATIENT)
Dept: PHYSICAL THERAPY | Age: 65
Setting detail: THERAPIES SERIES
Discharge: HOME | End: 2020-02-06
Payer: COMMERCIAL

## 2020-02-06 PROCEDURE — 97140 MANUAL THERAPY 1/> REGIONS: CPT

## 2020-02-06 NOTE — FLOWSHEET NOTE
Extremity Right (cm) Left (cm)  R (cm) R (cm)   Date 4/3/19 4/3/19  1/10/2020 1/27/20   Axillary           55 Cm above distal end 3rd digit 39.2 39  37.7 39   50 Cm above distal end 3rd digit 34.5 33.0  33.8 34.8   45 Cm above distal end 3rd digit 31.8 31.5  31.5 31.4   30 Cm above distal end 3rd digit 24.5 24.8  23.7 25.1   20 Cm above distal end 3rd digit 17.8 17  17.4 17.3   Wrist - Styloids 17.0 17  17.6 17.3   Distal Rios Crease 19.9 19.5  20.0 20   Thumbs Proximal Phalanx 6.5 6.5  6.8 6.7                           Total Girth 191.2 188. 3  188. 5 191.6                     1/23  Will do PN next visit to request additional visits. 1/17:  Pt still with fibrotic areas inferior aspect R breast however states it is much softer than prior to initiation of PT. RESTRICTIONS/PRECAUTIONS:  Breast ca    Exercises/Interventions:     Therapeutic Exercises (76507) Resistance / level Sets/sec Reps Notes                                                                  Therapeutic Activities (07188)                                          Neuromuscular Re-ed (50792)                                          Manual Intervention (81062)       MLD R breast to the ipsilateral groin; pathway clearance; node clearance    30  min                                            Pt. Education: 1/17:  Educated on the importance of compression with air travel and when driving long distances. Provided tensoshape for use until compression sleeve is purchased. 1/10  Pt educated on  Home management of symptoms including  anatomy,  Etiology,  Progression and potential complications.   educatged on s/s of cellulitis and need to contact MD>  Educated on beginning self MLD with fibrotic and fluid mobility across R breast to ipsil axilla x 30 min     -patient educated on diagnosis, prognosis and expectations for rehab  -all patient questions were answered    HEP instruction: 1/17:  No new additions this date  -patient provided with written and illustrated instructions for HEP (see scanned image in )    Therapeutic Exercise and NMR EXR  [] (84759) Provided verbal/tactile cueing for activities related to strengthening, flexibility, endurance, ROM for improvements in  [] LE / Lumbar: LE, proximal hip, and core control with self care, mobility, lifting, ambulation. [] UE / Cervical: cervical, postural, scapular, scapulothoracic and UE control with self care, reaching, carrying, lifting, house/yardwork, driving, computer work.  [] (93943) Provided verbal/tactile cueing for activities related to improving balance, coordination, kinesthetic sense, posture, motor skill, proprioception to assist with   [] LE / lumbar: LE, proximal hip, and core control in self care, mobility, lifting, ambulation and eccentric single leg control. [] UE / cervical: cervical, scapular, scapulothoracic and UE control with self care, reaching, carrying, lifting, house/yardwork, driving, computer work.   [] (23608) Therapist is in constant attendance of 2 or more patients providing skilled therapy interventions, but not providing any significant amount of measurable one-on-one time to either patient, for improvements in  [] LE / lumbar: LE, proximal hip, and core control in self care, mobility, lifting, ambulation and eccentric single leg control. [] UE / cervical: cervical, scapular, scapulothoracic and UE control with self care, reaching, carrying, lifting, house/yardwork, driving, computer work. NMR and Therapeutic Activities:    [] (80466 or 10132) Provided verbal/tactile cueing for activities related to improving balance, coordination, kinesthetic sense, posture, motor skill, proprioception and motor activation to allow for proper function of   [] LE: / Lumbar core, proximal hip and LE with self care and ADLs  [] UE / Cervical: cervical, postural, scapular, scapulothoracic and UE control with self care, carrying, lifting, driving, computer work.    []

## 2020-02-07 ENCOUNTER — OFFICE VISIT (OUTPATIENT)
Dept: FAMILY MEDICINE CLINIC | Age: 65
End: 2020-02-07
Payer: COMMERCIAL

## 2020-02-07 VITALS
WEIGHT: 176.8 LBS | BODY MASS INDEX: 33.41 KG/M2 | HEART RATE: 77 BPM | OXYGEN SATURATION: 97 % | SYSTOLIC BLOOD PRESSURE: 120 MMHG | DIASTOLIC BLOOD PRESSURE: 68 MMHG

## 2020-02-07 PROCEDURE — 99213 OFFICE O/P EST LOW 20 MIN: CPT | Performed by: NURSE PRACTITIONER

## 2020-02-07 RX ORDER — TRAMADOL HYDROCHLORIDE 50 MG/1
50 TABLET ORAL EVERY 8 HOURS PRN
Qty: 90 TABLET | Refills: 0 | Status: SHIPPED | OUTPATIENT
Start: 2020-02-07 | End: 2020-03-30

## 2020-02-07 ASSESSMENT — ENCOUNTER SYMPTOMS
CONSTIPATION: 0
DIARRHEA: 0
NAUSEA: 0
BACK PAIN: 1

## 2020-02-07 NOTE — PROGRESS NOTES
Carmel Guzman  : 1955  Encounter date: 2020    This jeromy 60 y.o. female who presents with  Chief Complaint   Patient presents with    3 Month Follow-Up     med check       History of present illness:    HPI Pt is 60year old female for medication recheck. Pt with history bilateral osteoarthritis knees and chronic back pain. Pt is in last round chemotherapy for breast cancer. Pt reports pain is well controlled with Ultram 50 mg TID. Denies current concerns. Current Outpatient Medications on File Prior to Visit   Medication Sig Dispense Refill    oxybutynin (DITROPAN) 5 MG tablet Take 1 tablet by mouth 2 times daily 180 tablet 3    capecitabine (XELODA) 500 MG chemo tablet       prochlorperazine (COMPAZINE) 10 MG tablet Take 10 mg by mouth every 6 hours as needed      pravastatin (PRAVACHOL) 20 MG tablet TAKE 1 TABLET DAILY 90 tablet 1    diclofenac 1.5 % SOLN APPLY 2 SQUIRTS TO THE AFFECTED AREA TWO TIMES A  mL 1    omeprazole (PRILOSEC) 40 MG delayed release capsule One daily 90 capsule 3    cephALEXin (KEFLEX) 500 MG capsule       citalopram (CELEXA) 10 MG tablet TAKE ONE TABLET BY MOUTH DAILY 90 tablet 3    terbinafine (ATHLETES FOOT) 1 % cream Apply topically 2 times daily. 36 g 1    losartan (COZAAR) 50 MG tablet Take 1 tablet by mouth daily 90 tablet 3    Multiple Vitamins-Minerals (MULTIVITAMIN ADULT PO) Take by mouth      triamcinolone (KENALOG) 0.1 % ointment Apply topically 2 times daily 80 g 2    metFORMIN (GLUCOPHAGE-XR) 500 MG extended release tablet TAKE ONE TABLET BY MOUTH THREE TIMES A DAY WITH MEALS 270 tablet 2    loratadine (CLARITIN) 10 MG tablet Take 10 mg by mouth daily      Calcium Carbonate-Vit D-Min (CALCIUM 1200 PO) Take by mouth      Cholecalciferol (VITAMIN D3) 2000 units CAPS Take by mouth      vitamin C (ASCORBIC ACID) 500 MG tablet Take 500 mg by mouth daily      Glucose Blood ([employer] TEST VI) 1 Device by In Vitro route.       glucose blood MASTECTOMY,  RIGHT AXILLARY LYMPH NODE DISSECTION, BIOZORB performed by Linnea Barrientos MD at [facility]. Right 2017    UPPER GASTROINTESTINAL ENDOSCOPY        Family History   Problem Relation Age of Onset    COPD Mother     Heart Disease Father     Cancer Other     Diabetes Other     Heart Disease Other     High Blood Pressure Other     Seizures Other     Diabetes Sister       Social History     Tobacco Use    Smoking status: Former Smoker     Packs/day: 0.25     Years: 10.00     Pack years: 2.50     Types: Cigarettes     Last attempt to quit: 1995     Years since quittin.8    Smokeless tobacco: Never Used   Substance Use Topics    Alcohol use: No     Alcohol/week: 0.0 standard drinks        Review of Systems   Constitutional: Negative for activity change and fatigue. Gastrointestinal: Negative for constipation, diarrhea and nausea. Musculoskeletal: Positive for arthralgias and back pain. Negative for gait problem and myalgias. Allergic/Immunologic: Positive for immunocompromised state. Neurological: Negative for headaches. Objective:    /68 (Site: Right Upper Arm, Position: Sitting, Cuff Size: Large Adult)   Pulse 77   Wt 176 lb 12.8 oz (80.2 kg)   SpO2 97%   BMI 33.41 kg/m²   Weight: 176 lb 12.8 oz (80.2 kg)     BP Readings from Last 3 Encounters:   20 120/68   19 110/60   19 118/78     Wt Readings from Last 3 Encounters:   20 176 lb 12.8 oz (80.2 kg)   19 174 lb 3.2 oz (79 kg)   19 182 lb 12.8 oz (82.9 kg)     BMI Readings from Last 3 Encounters:   20 33.41 kg/m²   19 32.91 kg/m²   19 34.54 kg/m²       Physical Exam  Vitals signs reviewed. Constitutional:       Appearance: Normal appearance. She is well-developed. Cardiovascular:      Rate and Rhythm: Normal rate and regular rhythm. Heart sounds: Normal heart sounds. No murmur.    Pulmonary: Effort: Pulmonary effort is normal.      Breath sounds: Normal breath sounds. Skin:     General: Skin is warm and dry. Neurological:      Mental Status: She is alert and oriented to person, place, and time. Assessment/Plan    1. Primary osteoarthritis of both knees  OARRS reviewed  Medication contract reviewed and signed  - traMADol (ULTRAM) 50 MG tablet; Take 1 tablet by mouth every 8 hours as needed for Pain for up to 30 days. Dispense: 90 tablet; Refill: 0    2. Chronic midline low back pain without sciatica    - traMADol (ULTRAM) 50 MG tablet; Take 1 tablet by mouth every 8 hours as needed for Pain for up to 30 days. Dispense: 90 tablet; Refill: 0      Return in about 3 months (around 5/7/2020) for medication re-check. This dictation was generated by voice recognition computer software. Although all attempts are made to edit the dictation for accuracy, there may be errors in the transcription that are not intended.

## 2020-02-10 NOTE — PROGRESS NOTES
changes. She has good range of motion with her arm. Her contralateral breast shows no new masses or changes in breast contour. There were no skin changes of the breast or nipple areolar complex. There was no nipple inversion or discharge. Respiratory: respirations are non-labored and there is no audible distress  Cardiovascular: regular rate, extremities appear well perfused  Neurologic: alert, oriented      Assessment/Plan:  [id number]  STAGE:  IIB right breast cancer  ER/MS/HER2 negative  S/p neoadjuvant chemotherapy  S/p PM with SLNB  S/p XRT    We reviewed her most recent breast imaging and physical exam findings. We reviewed her edema and erythematous changes. She does report that she notices more significant skin changes with treatments. We will continue to observe. Signs/symptoms of recurrence were reviewed. She verbalizes understanding that she should notify our office if she identifies any abnormalities on self evaluation as it may require further workup. I encouraged her to continue self breast evaluation. Follow up surveillance was discussed. Lymphedema was reviewed. She will continue to follow-up with physical therapy. Our plan at this time is to follow up with surgical breast oncology office in June/ July 2020 for interval right breast imaging. Bilateral breast imaging will be due in December 2020. All of the patient's questions were answered at this time however, she was encouraged to call the office with any further inquiries. Approximately 15 minutes of time were spent in this visit of which 50% or more of the time was related to coordination of care.

## 2020-02-11 ENCOUNTER — HOSPITAL ENCOUNTER (OUTPATIENT)
Dept: PHYSICAL THERAPY | Age: 65
Setting detail: THERAPIES SERIES
Discharge: HOME | End: 2020-02-11
Payer: COMMERCIAL

## 2020-02-11 PROCEDURE — 97140 MANUAL THERAPY 1/> REGIONS: CPT

## 2020-02-11 NOTE — FLOWSHEET NOTE
compression to inferior R breast for fibrotic tissue. 1/27  Prepared gray foam for added compression to R lateral breast.     GIRTH MEASUREMENT  (Tape on skin along anterior arm)     Upper Extremity Right (cm) Left (cm)  R (cm) R (cm)   Date 4/3/19 4/3/19  1/10/2020 1/27/20   Axillary           55 Cm above distal end 3rd digit 39.2 39  37.7 39   50 Cm above distal end 3rd digit 34.5 33.0  33.8 34.8   45 Cm above distal end 3rd digit 31.8 31.5  31.5 31.4   30 Cm above distal end 3rd digit 24.5 24.8  23.7 25.1   20 Cm above distal end 3rd digit 17.8 17  17.4 17.3   Wrist - Styloids 17.0 17  17.6 17.3   Distal Rios Crease 19.9 19.5  20.0 20   Thumbs Proximal Phalanx 6.5 6.5  6.8 6.7                           Total Girth 191.2 188. 3  188. 5 191.6                     1/23  Will do PN next visit to request additional visits. 1/17:  Pt still with fibrotic areas inferior aspect R breast however states it is much softer than prior to initiation of PT. RESTRICTIONS/PRECAUTIONS:  Breast ca    Exercises/Interventions:     Therapeutic Exercises (01002) Resistance / level Sets/sec Reps Notes                                                                  Therapeutic Activities (24166)                                          Neuromuscular Re-ed (91424)                                          Manual Intervention (35198)       MLD R breast to the ipsilateral groin; pathway clearance; node clearance    30  min                                            Pt. Education: 1/17:  Educated on the importance of compression with air travel and when driving long distances. Provided tensoshape for use until compression sleeve is purchased. 1/10  Pt educated on  Home management of symptoms including  anatomy,  Etiology,  Progression and potential complications.   educatged on s/s of cellulitis and need to contact MD>  Educated on beginning self MLD with fibrotic and fluid mobility across R breast to ipsil axilla x 30 min core, proximal hip and LE with self care and ADLs  [] UE / Cervical: cervical, postural, scapular, scapulothoracic and UE control with self care, carrying, lifting, driving, computer work.   [] (81550) Gait Re-education- Provided training and instruction to the patient for proper LE, core and proximal hip recruitment and positioning and eccentric body weight control with ambulation re-education including up and down stairs     Home Management Training / Self Care:  [] (69208) Provided self-care/home management training related to activities of daily living and compensatory training, and/or use of adaptive equipment for improvement with: ADLs and compensatory training, meal preparation, safety procedures and instruction in use of adaptive equipment, including bathing, grooming, dressing, personal hygiene, basic household cleaning and chores.      Home Exercise Program:    [] (24264) Reviewed/Progressed HEP activities related to strengthening, flexibility, endurance, ROM of   [] LE / Lumbar: core, proximal hip and LE for functional self-care, mobility, lifting and ambulation/stair navigation   [] UE / Cervical: cervical, postural, scapular, scapulothoracic and UE control with self care, reaching, carrying, lifting, house/yardwork, driving, computer work  [] (80377)Reviewed/Progressed HEP activities related to improving balance, coordination, kinesthetic sense, posture, motor skill, proprioception of   [] LE: core, proximal hip and LE for self care, mobility, lifting, and ambulation/stair navigation    [] UE / Cervical: cervical, postural,  scapular, scapulothoracic and UE control with self care, reaching, carrying, lifting, house/yardwork, driving, computer work    Manual Treatments:  PROM / STM / Oscillations-Mobs:  G-I, II, III, IV (PA's, Inf., Post.)  [x] (01321) Provided manual therapy to mobilize LE, proximal hip and/or LS spine soft tissue/joints for the purpose of modulating pain, promoting relaxation, increasing ROM, reducing/eliminating soft tissue swelling/inflammation/restriction, improving soft tissue extensibility and allowing for proper ROM for normal function with   [] LE / lumbar: self care, mobility, lifting and ambulation. [x] UE / Cervical: self care, reaching, carrying, lifting, house/yardwork, driving, computer work. Modalities:  [] (20000) Vasopneumatic compression: Utilized vasopneumatic compression to decrease edema / swelling for the purpose of improving mobility and quad tone / recruitment which will allow for increased overall function including but not limited to self-care, transfers, ambulation, and ascending / descending stairs. Modalities:  1/17:  None this date    Charges:  Timed Code Treatment Minutes: 30   Total Treatment Minutes: 30     [] EVAL - LOW (34627)   [] EVAL - MOD (15106)  [] EVAL - HIGH (07326)  [] RE-EVAL (79000)  [] TE (05667) x      [] Ionto  [] NMR (75196) x      [] Vaso  [x] Manual (39362) x 2      [] Ultrasound  [] TA x      [] Mech Traction (83590)  [] Gait Training x     [] ES (un) (58821):   [] Aquatic therapy x   [] Other:  Home management   [] Group:       LTG by 4 wks   Pt to report no pain in R breast to allow her to sleep uninterrupted-  Not met   Pt Indep with home management of symptoms   Progressing       GOALS:      Short term goals  Time Frame for Short term goals: Pt educated on Post operative procedures,  educated on s/s of lymphedema/complications,  methods to decrease risk. need for compression sleeve post operatively due to maximum lymph node removal anticipated. Patient Goals   Patient goals : pt  goal  is to understand complications and lymphedema prognosis and outcomes.       Overall Progression Towards Functional goals/ Treatment Progress Update:  [] Patient is progressing as expected towards functional goals listed. [] Progression is slowed due to complexities/Impairments listed.   [] Progression has been slowed due to co-morbidities. [x] Plan just implemented, too soon to assess goals progression <30days   [] Goals require adjustment due to lack of progress  [] Patient is not progressing as expected and requires additional follow up with physician  [] Other    Persisting Functional Limitations/Impairments:  [x]Sleeping []Sitting               []Standing []Transfers        []Walking []Kneeling               []Stairs []Squatting / bending   []ADLs [x]Reaching  []Lifting  [x]Housework  []Driving [x]Job related tasks  []Sports/Recreation []Other:        ASSESSMENT:  Pt tolerating treatment well; reports decreased fibrotic areas in breast  Treatment/Activity Tolerance:  [x] Patient able to complete tx [] Patient limited by fatigue  [] Patient limited by pain  [] Patient limited by other medical complications  [] Other:     Prognosis: [x] Good [] Fair  [] Poor    Patient Requires Follow-up: [x] Yes  [] No    Plan for next treatment session: Continue with MLD as tolerated. PLAN: See eval. PT 2x / week for 4 weeks. [x] Continue per plan of care [] Alter current plan (see comments)  [] Plan of care initiated [] Hold pending MD visit [] Discharge    Electronically signed by: Pradip Santana PT, DPT    Note: If patient does not return for scheduled/ recommended follow up visits, his note will serve as a discharge from care along with most recent update on progress.

## 2020-02-13 ENCOUNTER — OFFICE VISIT (OUTPATIENT)
Dept: SURGERY | Age: 65
End: 2020-02-13
Payer: COMMERCIAL

## 2020-02-13 ENCOUNTER — HOSPITAL ENCOUNTER (OUTPATIENT)
Dept: PHYSICAL THERAPY | Age: 65
Setting detail: THERAPIES SERIES
Discharge: HOME | End: 2020-02-13
Payer: COMMERCIAL

## 2020-02-13 VITALS
HEART RATE: 73 BPM | SYSTOLIC BLOOD PRESSURE: 105 MMHG | DIASTOLIC BLOOD PRESSURE: 63 MMHG | WEIGHT: 175.8 LBS | HEIGHT: 66 IN | BODY MASS INDEX: 28.25 KG/M2

## 2020-02-13 PROCEDURE — 97140 MANUAL THERAPY 1/> REGIONS: CPT

## 2020-02-13 PROCEDURE — 99213 OFFICE O/P EST LOW 20 MIN: CPT | Performed by: SURGERY

## 2020-02-13 NOTE — FLOWSHEET NOTE
OBJECTIVE:  2/11 improvement in fibrotic tissue R breast and decrease in edema  Continues with erythema R breast   1/30  Prepared gray foam for added compression to inferior R breast for fibrotic tissue. 1/27  Prepared gray foam for added compression to R lateral breast.     GIRTH MEASUREMENT  (Tape on skin along anterior arm)     Upper Extremity Right (cm) Left (cm)  R (cm) R (cm)   Date 4/3/19 4/3/19  1/10/2020 1/27/20   Axillary           55 Cm above distal end 3rd digit 39.2 39  37.7 39   50 Cm above distal end 3rd digit 34.5 33.0  33.8 34.8   45 Cm above distal end 3rd digit 31.8 31.5  31.5 31.4   30 Cm above distal end 3rd digit 24.5 24.8  23.7 25.1   20 Cm above distal end 3rd digit 17.8 17  17.4 17.3   Wrist - Styloids 17.0 17  17.6 17.3   Distal Rios Crease 19.9 19.5  20.0 20   Thumbs Proximal Phalanx 6.5 6.5  6.8 6.7                           Total Girth 191.2 188. 3  188. 5 191.6                     1/23  Will do PN next visit to request additional visits. 1/17:  Pt still with fibrotic areas inferior aspect R breast however states it is much softer than prior to initiation of PT. RESTRICTIONS/PRECAUTIONS:  Breast ca    Exercises/Interventions:     Therapeutic Exercises (01377) Resistance / level Sets/sec Reps Notes                                                                  Therapeutic Activities (64842)                                          Neuromuscular Re-ed (88994)                                          Manual Intervention (36578)       MLD R breast to the ipsilateral groin; pathway clearance; node clearance    28min                                            Pt. Education: 1/17:  Educated on the importance of compression with air travel and when driving long distances. Provided tensoshape for use until compression sleeve is purchased. 1/10  Pt educated on  Home management of symptoms including  anatomy,  Etiology,  Progression and potential complications.   educatged on balance, coordination, kinesthetic sense, posture, motor skill, proprioception and motor activation to allow for proper function of   [] LE: / Lumbar core, proximal hip and LE with self care and ADLs  [] UE / Cervical: cervical, postural, scapular, scapulothoracic and UE control with self care, carrying, lifting, driving, computer work.   [] (33278) Gait Re-education- Provided training and instruction to the patient for proper LE, core and proximal hip recruitment and positioning and eccentric body weight control with ambulation re-education including up and down stairs     Home Management Training / Self Care:  [] (42774) Provided self-care/home management training related to activities of daily living and compensatory training, and/or use of adaptive equipment for improvement with: ADLs and compensatory training, meal preparation, safety procedures and instruction in use of adaptive equipment, including bathing, grooming, dressing, personal hygiene, basic household cleaning and chores.      Home Exercise Program:    [] (51362) Reviewed/Progressed HEP activities related to strengthening, flexibility, endurance, ROM of   [] LE / Lumbar: core, proximal hip and LE for functional self-care, mobility, lifting and ambulation/stair navigation   [] UE / Cervical: cervical, postural, scapular, scapulothoracic and UE control with self care, reaching, carrying, lifting, house/yardwork, driving, computer work  [] (64168)Reviewed/Progressed HEP activities related to improving balance, coordination, kinesthetic sense, posture, motor skill, proprioception of   [] LE: core, proximal hip and LE for self care, mobility, lifting, and ambulation/stair navigation    [] UE / Cervical: cervical, postural,  scapular, scapulothoracic and UE control with self care, reaching, carrying, lifting, house/yardwork, driving, computer work    Manual Treatments:  PROM / STM / Oscillations-Mobs:  G-I, II, III, IV (PA's, Inf., Post.)  [x] (43180) Provided manual therapy to mobilize LE, proximal hip and/or LS spine soft tissue/joints for the purpose of modulating pain, promoting relaxation,  increasing ROM, reducing/eliminating soft tissue swelling/inflammation/restriction, improving soft tissue extensibility and allowing for proper ROM for normal function with   [] LE / lumbar: self care, mobility, lifting and ambulation. [x] UE / Cervical: self care, reaching, carrying, lifting, house/yardwork, driving, computer work. Modalities:  [] (35953) Vasopneumatic compression: Utilized vasopneumatic compression to decrease edema / swelling for the purpose of improving mobility and quad tone / recruitment which will allow for increased overall function including but not limited to self-care, transfers, ambulation, and ascending / descending stairs. Modalities:  1/17:  None this date    Charges:  Timed Code Treatment Minutes: 28/   Total Treatment Minutes: 28     [] EVAL - LOW (81010)   [] EVAL - MOD (42408)  [] EVAL - HIGH (02069)  [] RE-EVAL (68899)  [] TE (84386) x      [] Ionto  [] NMR (46069) x      [] Vaso  [x] Manual (52142) x 2      [] Ultrasound  [] TA x      [] Mech Traction (44979)  [] Gait Training x     [] ES (un) (30046):   [] Aquatic therapy x   [] Other:  Home management   [] Group:       LTG by 4 wks   Pt to report no pain in R breast to allow her to sleep uninterrupted-  Not met   Pt Indep with home management of symptoms   Progressing       GOALS:      Short term goals  Time Frame for Short term goals: Pt educated on Post operative procedures,  educated on s/s of lymphedema/complications,  methods to decrease risk. need for compression sleeve post operatively due to maximum lymph node removal anticipated.    Patient Goals   Patient goals : pt  goal  is to understand complications and lymphedema prognosis and outcomes.       Overall Progression Towards Functional goals/ Treatment Progress Update:  [] Patient is progressing as expected

## 2020-02-18 ENCOUNTER — HOSPITAL ENCOUNTER (OUTPATIENT)
Dept: PHYSICAL THERAPY | Age: 65
Setting detail: THERAPIES SERIES
Discharge: HOME | End: 2020-02-18
Payer: COMMERCIAL

## 2020-02-18 PROCEDURE — 97140 MANUAL THERAPY 1/> REGIONS: CPT

## 2020-02-18 NOTE — FLOWSHEET NOTE
feeling some better today. OBJECTIVE:  2/11 improvement in fibrotic tissue R breast and decrease in edema  Continues with erythema R breast   1/30  Prepared gray foam for added compression to inferior R breast for fibrotic tissue. 1/27  Prepared gray foam for added compression to R lateral breast.     GIRTH MEASUREMENT  (Tape on skin along anterior arm)     Upper Extremity Right (cm) Left (cm)  R (cm) R (cm)   Date 4/3/19 4/3/19  1/10/2020 1/27/20   Axillary           55 Cm above distal end 3rd digit 39.2 39  37.7 39   50 Cm above distal end 3rd digit 34.5 33.0  33.8 34.8   45 Cm above distal end 3rd digit 31.8 31.5  31.5 31.4   30 Cm above distal end 3rd digit 24.5 24.8  23.7 25.1   20 Cm above distal end 3rd digit 17.8 17  17.4 17.3   Wrist - Styloids 17.0 17  17.6 17.3   Distal Rios Crease 19.9 19.5  20.0 20   Thumbs Proximal Phalanx 6.5 6.5  6.8 6.7                           Total Girth 191.2 188. 3  188. 5 191.6                     1/23  Will do PN next visit to request additional visits. 1/17:  Pt still with fibrotic areas inferior aspect R breast however states it is much softer than prior to initiation of PT. RESTRICTIONS/PRECAUTIONS:  Breast ca    Exercises/Interventions:     Therapeutic Exercises (07651) Resistance / level Sets/sec Reps Notes                                                                  Therapeutic Activities (10164)                                          Neuromuscular Re-ed (15118)                                          Manual Intervention (86342)       MLD R breast to the ipsilateral groin; pathway clearance; node clearance    28min                                            Pt. Education: 1/17:  Educated on the importance of compression with air travel and when driving long distances. Provided tensoshape for use until compression sleeve is purchased.     1/10  Pt educated on  Home management of symptoms including  anatomy,  Etiology,  Progression and potential activities related to improving balance, coordination, kinesthetic sense, posture, motor skill, proprioception and motor activation to allow for proper function of   [] LE: / Lumbar core, proximal hip and LE with self care and ADLs  [] UE / Cervical: cervical, postural, scapular, scapulothoracic and UE control with self care, carrying, lifting, driving, computer work.   [] (93409) Gait Re-education- Provided training and instruction to the patient for proper LE, core and proximal hip recruitment and positioning and eccentric body weight control with ambulation re-education including up and down stairs     Home Management Training / Self Care:  [] (73735) Provided self-care/home management training related to activities of daily living and compensatory training, and/or use of adaptive equipment for improvement with: ADLs and compensatory training, meal preparation, safety procedures and instruction in use of adaptive equipment, including bathing, grooming, dressing, personal hygiene, basic household cleaning and chores.      Home Exercise Program:    [] (57529) Reviewed/Progressed HEP activities related to strengthening, flexibility, endurance, ROM of   [] LE / Lumbar: core, proximal hip and LE for functional self-care, mobility, lifting and ambulation/stair navigation   [] UE / Cervical: cervical, postural, scapular, scapulothoracic and UE control with self care, reaching, carrying, lifting, house/yardwork, driving, computer work  [] (08559)Reviewed/Progressed HEP activities related to improving balance, coordination, kinesthetic sense, posture, motor skill, proprioception of   [] LE: core, proximal hip and LE for self care, mobility, lifting, and ambulation/stair navigation    [] UE / Cervical: cervical, postural,  scapular, scapulothoracic and UE control with self care, reaching, carrying, lifting, house/yardwork, driving, computer work    Manual Treatments:  PROM / STM / Oscillations-Mobs:  G-I, II, III, IV (PA's, Inf., Post.)  [x] (59622) Provided manual therapy to mobilize LE, proximal hip and/or LS spine soft tissue/joints for the purpose of modulating pain, promoting relaxation,  increasing ROM, reducing/eliminating soft tissue swelling/inflammation/restriction, improving soft tissue extensibility and allowing for proper ROM for normal function with   [] LE / lumbar: self care, mobility, lifting and ambulation. [x] UE / Cervical: self care, reaching, carrying, lifting, house/yardwork, driving, computer work. Modalities:  [] (37114) Vasopneumatic compression: Utilized vasopneumatic compression to decrease edema / swelling for the purpose of improving mobility and quad tone / recruitment which will allow for increased overall function including but not limited to self-care, transfers, ambulation, and ascending / descending stairs. Modalities:  1/17:  None this date    Charges:  Timed Code Treatment Minutes: 28/   Total Treatment Minutes: 28     [] EVAL - LOW (37482)   [] EVAL - MOD (76532)  [] EVAL - HIGH (48296)  [] RE-EVAL (66052)  [] TE (13683) x      [] Ionto  [] NMR (97724) x      [] Vaso  [x] Manual (70875) x 2      [] Ultrasound  [] TA x      [] Mech Traction (70885)  [] Gait Training x     [] ES (un) (88912):   [] Aquatic therapy x   [] Other:  Home management   [] Group:       LTG by 4 wks   Pt to report no pain in R breast to allow her to sleep uninterrupted-  Not met   Pt Indep with home management of symptoms   Progressing       GOALS:      Short term goals  Time Frame for Short term goals: Pt educated on Post operative procedures,  educated on s/s of lymphedema/complications,  methods to decrease risk. need for compression sleeve post operatively due to maximum lymph node removal anticipated.    Patient Goals   Patient goals : pt  goal  is to understand complications and lymphedema prognosis and outcomes.       Overall Progression Towards Functional goals/ Treatment Progress Update:  [] Patient is

## 2020-02-21 ENCOUNTER — HOSPITAL ENCOUNTER (OUTPATIENT)
Dept: PHYSICAL THERAPY | Age: 65
Setting detail: THERAPIES SERIES
Discharge: HOME | End: 2020-02-21
Payer: COMMERCIAL

## 2020-02-21 PROCEDURE — 97140 MANUAL THERAPY 1/> REGIONS: CPT

## 2020-02-21 NOTE — FLOWSHEET NOTE
[address] [facility]  Phone: [phone]   Fax: [phone]    Physical Therapy Daily Treatment Note  Date:  2020     Patient Name:  Salas Watson    :  1955  MRN: [id number]        Medical/Treatment Diagnosis Information:  ·   R breast Cancer     R breast edema/fibrosis,  R shoulder decrease AROM  Insurance/Certification information:    [facility]  Physician Information:    Rosalva Ospina MD  Plan of care signed (Y/N): Faxed 1/10/2010    Date of Patient follow up with Physician:   Dr. Eddy Vincent on ,  Dr. Ronit Medina 3/3     Functional scale[de-identified] LLIS  Score 17  40% disability    Progress Note: []  Yes  [x]  No  Next due by: Visit #10       Latex Allergy:  [x]NO      []YES  Preferred Language for Healthcare:   [x]English       []other:    Visit # Insurance Allowable Date Range (if applicable)   ,        Pain level:  0/10  R lateral breast      SUBJECTIVE:  pt stated she hit her chest against a cart of rails. No bruising noted.   Erythema continues, but some days not as bad. L foot having more pain today. ,  Stated saw Dr. Eddy Vincent and was given good report. No concern for cellulitis due to redness present,         Pt stated fibrotic tissue is improved,  No pain,  Swelling decreasing        Pt provided Rx for additional therapy.    Started chemo pill today     2/3,   Pt states that the denseness of the R inferior breast is improved with the use of the gray foam.   pt states that she has noticed significant improvement in edema lateral breast with use of gray foam pad,        Pt stated having general achiness in lateal R breast today    Pt states still noticing improvement,  Denseness in lower aspect of breast is better,  But  on upper breast.  States is now able to reach     : says that she is noticing a lot of improvement with her breast since starting therapy; less painful and swollen  :  Pt with some tenderness R breast; states she had some soreness last night however is feeling some better today. OBJECTIVE:  2/11 improvement in fibrotic tissue R breast and decrease in edema  Continues with erythema R breast   1/30  Prepared gray foam for added compression to inferior R breast for fibrotic tissue. 1/27  Prepared gray foam for added compression to R lateral breast.     GIRTH MEASUREMENT  (Tape on skin along anterior arm)     Upper Extremity Right (cm) Left (cm)  R (cm) R (cm)   Date 4/3/19 4/3/19  1/10/2020 1/27/20   Axillary           55 Cm above distal end 3rd digit 39.2 39  37.7 39   50 Cm above distal end 3rd digit 34.5 33.0  33.8 34.8   45 Cm above distal end 3rd digit 31.8 31.5  31.5 31.4   30 Cm above distal end 3rd digit 24.5 24.8  23.7 25.1   20 Cm above distal end 3rd digit 17.8 17  17.4 17.3   Wrist - Styloids 17.0 17  17.6 17.3   Distal Rios Crease 19.9 19.5  20.0 20   Thumbs Proximal Phalanx 6.5 6.5  6.8 6.7                           Total Girth 191.2 188. 3  188. 5 191.6                     1/23  Will do PN next visit to request additional visits. 1/17:  Pt still with fibrotic areas inferior aspect R breast however states it is much softer than prior to initiation of PT. RESTRICTIONS/PRECAUTIONS:  Breast ca    Exercises/Interventions:     Therapeutic Exercises (73169) Resistance / level Sets/sec Reps Notes                                                                  Therapeutic Activities (26133)                                          Neuromuscular Re-ed (54085)                                          Manual Intervention (64875)       MLD R breast to the ipsilateral groin; pathway clearance; node clearance    28min                                            Pt. Education: 1/17:  Educated on the importance of compression with air travel and when driving long distances. Provided tensoshape for use until compression sleeve is purchased.     1/10  Pt educated on  Home Therapeutic Activities:    [] (82606 or 11263) Provided verbal/tactile cueing for activities related to improving balance, coordination, kinesthetic sense, posture, motor skill, proprioception and motor activation to allow for proper function of   [] LE: / Lumbar core, proximal hip and LE with self care and ADLs  [] UE / Cervical: cervical, postural, scapular, scapulothoracic and UE control with self care, carrying, lifting, driving, computer work.   [] (77755) Gait Re-education- Provided training and instruction to the patient for proper LE, core and proximal hip recruitment and positioning and eccentric body weight control with ambulation re-education including up and down stairs     Home Management Training / Self Care:  [] (04038) Provided self-care/home management training related to activities of daily living and compensatory training, and/or use of adaptive equipment for improvement with: ADLs and compensatory training, meal preparation, safety procedures and instruction in use of adaptive equipment, including bathing, grooming, dressing, personal hygiene, basic household cleaning and chores.      Home Exercise Program:    [] (45610) Reviewed/Progressed HEP activities related to strengthening, flexibility, endurance, ROM of   [] LE / Lumbar: core, proximal hip and LE for functional self-care, mobility, lifting and ambulation/stair navigation   [] UE / Cervical: cervical, postural, scapular, scapulothoracic and UE control with self care, reaching, carrying, lifting, house/yardwork, driving, computer work  [] (29351)Reviewed/Progressed HEP activities related to improving balance, coordination, kinesthetic sense, posture, motor skill, proprioception of   [] LE: core, proximal hip and LE for self care, mobility, lifting, and ambulation/stair navigation    [] UE / Cervical: cervical, postural,  scapular, scapulothoracic and UE control with self care, reaching, carrying, lifting, house/yardwork, driving, computer work    Manual Treatments:  PROM / STM / Oscillations-Mobs:  G-I, II, III, IV (PA's, Inf., Post.)  [x] (28503) Provided manual therapy to mobilize LE, proximal hip and/or LS spine soft tissue/joints for the purpose of modulating pain, promoting relaxation,  increasing ROM, reducing/eliminating soft tissue swelling/inflammation/restriction, improving soft tissue extensibility and allowing for proper ROM for normal function with   [] LE / lumbar: self care, mobility, lifting and ambulation. [x] UE / Cervical: self care, reaching, carrying, lifting, house/yardwork, driving, computer work. Modalities:  [] (88849) Vasopneumatic compression: Utilized vasopneumatic compression to decrease edema / swelling for the purpose of improving mobility and quad tone / recruitment which will allow for increased overall function including but not limited to self-care, transfers, ambulation, and ascending / descending stairs. Modalities:  1/17:  None this date    Charges:  Timed Code Treatment Minutes: 28/   Total Treatment Minutes: 28     [] EVAL - LOW (67088)   [] EVAL - MOD (91192)  [] EVAL - HIGH (24810)  [] RE-EVAL (26976)  [] TE (93299) x      [] Ionto  [] NMR (19750) x      [] Vaso  [x] Manual (63278) x 2      [] Ultrasound  [] TA x      [] Mech Traction (62247)  [] Gait Training x     [] ES (un) (16601):   [] Aquatic therapy x   [] Other:  Home management   [] Group:       LTG by 4 wks   Pt to report no pain in R breast to allow her to sleep uninterrupted-  Not met   Pt Indep with home management of symptoms   Progressing       GOALS:      Short term goals  Time Frame for Short term goals: Pt educated on Post operative procedures,  educated on s/s of lymphedema/complications,  methods to decrease risk. need for compression sleeve post operatively due to maximum lymph node removal anticipated. Patient Goals   Patient goals : pt  goal  is to understand complications and lymphedema prognosis and outcomes.

## 2020-02-24 ENCOUNTER — HOSPITAL ENCOUNTER (OUTPATIENT)
Dept: PHYSICAL THERAPY | Age: 65
Setting detail: THERAPIES SERIES
Discharge: HOME | End: 2020-02-24
Payer: COMMERCIAL

## 2020-02-24 PROCEDURE — 97140 MANUAL THERAPY 1/> REGIONS: CPT

## 2020-02-24 NOTE — FLOWSHEET NOTE
driving long distances. Provided tensoshape for use until compression sleeve is purchased. 1/10  Pt educated on  Home management of symptoms including  anatomy,  Etiology,  Progression and potential complications. educatged on s/s of cellulitis and need to contact MD>  Educated on beginning self MLD with fibrotic and fluid mobility across R breast to ipsil axilla x 30 min     -patient educated on diagnosis, prognosis and expectations for rehab  -all patient questions were answered    HEP instruction: 1/17:  No new additions this date  -patient provided with written and illustrated instructions for HEP (see scanned image in )    Therapeutic Exercise and NMR EXR  [] (97463) Provided verbal/tactile cueing for activities related to strengthening, flexibility, endurance, ROM for improvements in  [] LE / Lumbar: LE, proximal hip, and core control with self care, mobility, lifting, ambulation. [] UE / Cervical: cervical, postural, scapular, scapulothoracic and UE control with self care, reaching, carrying, lifting, house/yardwork, driving, computer work.  [] (08941) Provided verbal/tactile cueing for activities related to improving balance, coordination, kinesthetic sense, posture, motor skill, proprioception to assist with   [] LE / lumbar: LE, proximal hip, and core control in self care, mobility, lifting, ambulation and eccentric single leg control. [] UE / cervical: cervical, scapular, scapulothoracic and UE control with self care, reaching, carrying, lifting, house/yardwork, driving, computer work.   [] (08913) Therapist is in constant attendance of 2 or more patients providing skilled therapy interventions, but not providing any significant amount of measurable one-on-one time to either patient, for improvements in  [] LE / lumbar: LE, proximal hip, and core control in self care, mobility, lifting, ambulation and eccentric single leg control.    [] UE / cervical: cervical, scapular, scapulothoracic and UE control with self care, reaching, carrying, lifting, house/yardwork, driving, computer work. NMR and Therapeutic Activities:    [] (62269 or 51950) Provided verbal/tactile cueing for activities related to improving balance, coordination, kinesthetic sense, posture, motor skill, proprioception and motor activation to allow for proper function of   [] LE: / Lumbar core, proximal hip and LE with self care and ADLs  [] UE / Cervical: cervical, postural, scapular, scapulothoracic and UE control with self care, carrying, lifting, driving, computer work.   [] (58784) Gait Re-education- Provided training and instruction to the patient for proper LE, core and proximal hip recruitment and positioning and eccentric body weight control with ambulation re-education including up and down stairs     Home Management Training / Self Care:  [] (58310) Provided self-care/home management training related to activities of daily living and compensatory training, and/or use of adaptive equipment for improvement with: ADLs and compensatory training, meal preparation, safety procedures and instruction in use of adaptive equipment, including bathing, grooming, dressing, personal hygiene, basic household cleaning and chores.      Home Exercise Program:    [] (27263) Reviewed/Progressed HEP activities related to strengthening, flexibility, endurance, ROM of   [] LE / Lumbar: core, proximal hip and LE for functional self-care, mobility, lifting and ambulation/stair navigation   [] UE / Cervical: cervical, postural, scapular, scapulothoracic and UE control with self care, reaching, carrying, lifting, house/yardwork, driving, computer work  [] (19388)Reviewed/Progressed HEP activities related to improving balance, coordination, kinesthetic sense, posture, motor skill, proprioception of   [] LE: core, proximal hip and LE for self care, mobility, lifting, and ambulation/stair navigation    [] UE / Cervical: cervical,

## 2020-02-27 ENCOUNTER — HOSPITAL ENCOUNTER (OUTPATIENT)
Dept: PHYSICAL THERAPY | Age: 65
Setting detail: THERAPIES SERIES
Discharge: HOME | End: 2020-02-27
Payer: COMMERCIAL

## 2020-02-27 PROCEDURE — 97140 MANUAL THERAPY 1/> REGIONS: CPT

## 2020-02-27 NOTE — FLOWSHEET NOTE
[address] [facility]  Phone: [phone]   Fax: [phone]    Physical Therapy Daily Treatment/Discharge Note  Date:  2020     Patient Name:  Duc Whiteside    :  1955  MRN: [id number]      Physical Therapy Re-Certification Plan of Care    Dear  , Walter Macias       We had the pleasure of treating the following patient for physical therapy services at [address] [place] [address]. A summary of our findings can be found in the updated assessment below. This includes our plan of care. If you have any questions or concerns regarding these findings, please do not hesitate to contact me at the office phone number checked above. Thank you for the referral.     Physician Signature:________________________________Date:__________________  By signing above (or electronic signature), therapists plan is approved by physician      Functional Outcome: LLIS  Score 7  10% disability    Overall Response to Treatment:   []Patient is responding well to treatment and improvement is noted with regards  to goals   [x]Patient should continue to improve in reasonable time if they continue HEP   []Patient has plateaued and is no longer responding to skilled PT intervention    []Patient is getting worse and would benefit from return to referring MD   []Patient unable to adhere to initial POC   []Other:   Date range of Visits: 1/10/2020 - 2020  Total Visits: 15    Recommendation:  Pt ready  For discharge     []Continue PT / wk for  weeks. []Hold PT, pending MD visit        Medical/Treatment Diagnosis Information:  ·   R breast Cancer     R breast edema/fibrosis,  R shoulder decrease AROM  Insurance/Certification information:    [employer] [place] PPO  Physician Information:    Shalonda Vazquez MD  Plan of care signed (Y/N):   Faxed 1/10/2010    Date of Patient follow up with Physician:   Dr. Elise Haque on ,  Dr. Walter Macias 3/3     Functional scale[de-identified] LLIS  Score 17  40% and need to contact MD>  Educated on beginning self MLD with fibrotic and fluid mobility across R breast to ipsil axilla x 30 min     -patient educated on diagnosis, prognosis and expectations for rehab  -all patient questions were answered    HEP instruction: 1/17:  No new additions this date  -patient provided with written and illustrated instructions for HEP (see scanned image in )    Therapeutic Exercise and NMR EXR  [] (77824) Provided verbal/tactile cueing for activities related to strengthening, flexibility, endurance, ROM for improvements in  [] LE / Lumbar: LE, proximal hip, and core control with self care, mobility, lifting, ambulation. [] UE / Cervical: cervical, postural, scapular, scapulothoracic and UE control with self care, reaching, carrying, lifting, house/yardwork, driving, computer work.  [] (56475) Provided verbal/tactile cueing for activities related to improving balance, coordination, kinesthetic sense, posture, motor skill, proprioception to assist with   [] LE / lumbar: LE, proximal hip, and core control in self care, mobility, lifting, ambulation and eccentric single leg control. [] UE / cervical: cervical, scapular, scapulothoracic and UE control with self care, reaching, carrying, lifting, house/yardwork, driving, computer work.   [] (25151) Therapist is in constant attendance of 2 or more patients providing skilled therapy interventions, but not providing any significant amount of measurable one-on-one time to either patient, for improvements in  [] LE / lumbar: LE, proximal hip, and core control in self care, mobility, lifting, ambulation and eccentric single leg control. [] UE / cervical: cervical, scapular, scapulothoracic and UE control with self care, reaching, carrying, lifting, house/yardwork, driving, computer work.      NMR and Therapeutic Activities:    [] (45700 or ) Provided verbal/tactile cueing for activities related to improving balance, coordination, mobilize LE, proximal hip and/or LS spine soft tissue/joints for the purpose of modulating pain, promoting relaxation,  increasing ROM, reducing/eliminating soft tissue swelling/inflammation/restriction, improving soft tissue extensibility and allowing for proper ROM for normal function with   [] LE / lumbar: self care, mobility, lifting and ambulation. [x] UE / Cervical: self care, reaching, carrying, lifting, house/yardwork, driving, computer work. Modalities:  [] (02803) Vasopneumatic compression: Utilized vasopneumatic compression to decrease edema / swelling for the purpose of improving mobility and quad tone / recruitment which will allow for increased overall function including but not limited to self-care, transfers, ambulation, and ascending / descending stairs. Modalities:  1/17:  None this date    Charges:  Timed Code Treatment Minutes: 28   Total Treatment Minutes: 35     [] EVAL - LOW (12472)   [] EVAL - MOD (32402)  [] EVAL - HIGH (66226)  [] RE-EVAL (32232)  [] TE (50331) x      [] Ionto  [] NMR (52012) x      [] Vaso  [x] Manual (67251) x 2      [] Ultrasound  [] TA x      [] Mech Traction (23058)  [] Gait Training x     [] ES (un) (97704):   [] Aquatic therapy x   [] Other:  Home management   [] Group:       LTG by 4 wks   Pt to report no pain in R breast to allow her to sleep uninterrupted-  Not  Fully met,  --occasional sharp pains inR breast   Pt Indep with home management of symptoms  Goal met      GOALS:      Short term goals  Time Frame for Short term goals: Pt educated on Post operative procedures,  educated on s/s of lymphedema/complications,  methods to decrease risk. need for compression sleeve post operatively due to maximum lymph node removal anticipated. Patient Goals   Patient goals : pt  goal  is to understand complications and lymphedema prognosis and outcomes.     Goal met     Overall Progression Towards Functional goals/ Treatment Progress Update:  [x] Patient is progressing as expected towards functional goals listed. [] Progression is slowed due to complexities/Impairments listed. [] Progression has been slowed due to co-morbidities. [] Plan just implemented, too soon to assess goals progression <30days   [] Goals require adjustment due to lack of progress  [] Patient is not progressing as expected and requires additional follow up with physician  [] Other    Persisting Functional Limitations/Impairments:  [x]Sleeping []Sitting               []Standing []Transfers        []Walking []Kneeling               []Stairs []Squatting / bending   []ADLs [x]Reaching  []Lifting  [x]Housework  []Driving [x]Job related tasks  []Sports/Recreation []Other:        ASSESSMENT:  Pt tolerating treatment well;   Pt Indep with home management of swelling. Is compiant with compression as directed. voerall decrease of 1.ecm in R UE  And 2 cm decrease in volume in R breast    Treatment/Activity Tolerance:  [x] Patient able to complete tx [] Patient limited by fatigue  [] Patient limited by pain  [] Patient limited by other medical complications  [] Other:     Prognosis: [x] Good [] Fair  [] Poor    Patient Requires Follow-up: [x] Yes  [x] No    Plan for next treatment session:   Pt discharged. PLAN: See eval. PT 2x / week for 4 weeks. [] Continue per plan of care [] Alter current plan (see comments)  [] Plan of care initiated [] Hold pending MD visit [x] Discharge    Electronically signed by: Hany Hernandez PT, DPT    Note: If patient does not return for scheduled/ recommended follow up visits, his note will serve as a discharge from care along with most recent update on progress.

## 2020-03-30 RX ORDER — TRAMADOL HYDROCHLORIDE 50 MG/1
TABLET ORAL
Qty: 90 TABLET | Refills: 0 | Status: SHIPPED | OUTPATIENT
Start: 2020-03-30 | End: 2020-05-01 | Stop reason: CLARIF

## 2020-03-30 NOTE — TELEPHONE ENCOUNTER
Medication:   Requested Prescriptions     Pending Prescriptions Disp Refills    traMADol (ULTRAM) 50 MG tablet [Pharmacy Med Name: traMADol HCL 50MG TABLET] 90 tablet 0     Sig: TAKE ONE TABLET BY MOUTH EVERY 8 HOURS AS NEEDED FOR PAIN      Last Filled:  2/7/2020    Patient Phone Number: [phone] (home) [phone] (work)    Last appt: 2/7/2020   Next appt: 5/7/2020    Last OARRS:   RX Monitoring 2/7/2020   Attestation -   Acute Pain Prescriptions -   Periodic Controlled Substance Monitoring No signs of potential drug abuse or diversion identified.    Chronic Pain > 80 MEDD -       Preferred Pharmacy:   [facility], [address] [place] [address],[address] [id number]  Phone: [phone] Fax: [phone]

## 2020-03-31 RX ORDER — METFORMIN HYDROCHLORIDE 500 MG/1
TABLET, EXTENDED RELEASE ORAL
Qty: 270 TABLET | Refills: 0 | Status: SHIPPED | OUTPATIENT
Start: 2020-03-31 | End: 2020-06-30

## 2020-04-06 ENCOUNTER — OFFICE VISIT (OUTPATIENT)
Dept: PRIMARY CARE CLINIC | Age: 65
End: 2020-04-06
Payer: COMMERCIAL

## 2020-04-06 VITALS — HEART RATE: 83 BPM | OXYGEN SATURATION: 96 % | TEMPERATURE: 100.3 F

## 2020-04-06 LAB
INFLUENZA A ANTIGEN, POC: NEGATIVE
INFLUENZA B ANTIGEN, POC: NEGATIVE
S PYO AG THROAT QL: NORMAL

## 2020-04-06 PROCEDURE — 99213 OFFICE O/P EST LOW 20 MIN: CPT | Performed by: NURSE PRACTITIONER

## 2020-04-06 PROCEDURE — 87880 STREP A ASSAY W/OPTIC: CPT | Performed by: NURSE PRACTITIONER

## 2020-04-06 PROCEDURE — 87804 INFLUENZA ASSAY W/OPTIC: CPT | Performed by: NURSE PRACTITIONER

## 2020-04-06 NOTE — PATIENT INSTRUCTIONS
bathroom, if available. Animals: You should restrict contact with pets and other animals while you are sick with COVID-19, just like you would around other people. Although there have not been reports of pets or other animals becoming sick with COVID-19, it is still recommended that people sick with COVID-19 limit contact with animals until more information is known about the virus. When possible, have another member of your household care for your animals while you are sick. If you are sick with COVID-19, avoid contact with your pet, including petting, snuggling, being kissed or licked, and sharing food. If you must care for your pet or be around animals while you are sick, wash your hands before and after you interact with pets and wear a facemask. Call ahead before visiting your doctor  If you have a medical appointment, call the healthcare provider and tell them that you have or may have COVID-19. This will help the healthcare providers office take steps to keep other people from getting infected or exposed. Wear a facemask  You should wear a facemask when you are around other people (e.g., sharing a room or vehicle) or pets and before you enter a healthcare providers office. If you are not able to wear a facemask (for example, because it causes trouble breathing), then people who live with you should not stay in the same room with you, or they should wear a facemask if they enter your room. Cover your coughs and sneezes  Cover your mouth and nose with a tissue when you cough or sneeze. Throw used tissues in a lined trash can. Immediately wash your hands with soap and water for at least 20 seconds or, if soap and water are not available, clean your hands with an alcohol-based hand  that contains at least 60% alcohol.   Clean your hands often  Wash your hands often with soap and water for at least 20 seconds, especially after blowing your nose, coughing, or sneezing; going to the bathroom; and

## 2020-04-07 ENCOUNTER — TELEPHONE (OUTPATIENT)
Dept: FAMILY MEDICINE CLINIC | Age: 65
End: 2020-04-07

## 2020-04-07 RX ORDER — METHYLPREDNISOLONE 4 MG/1
TABLET ORAL
Qty: 1 KIT | Refills: 0 | Status: SHIPPED | OUTPATIENT
Start: 2020-04-07 | End: 2020-04-13

## 2020-04-07 RX ORDER — AMOXICILLIN 500 MG/1
1000 CAPSULE ORAL 2 TIMES DAILY
Qty: 40 CAPSULE | Refills: 0 | Status: SHIPPED | OUTPATIENT
Start: 2020-04-07 | End: 2020-04-17

## 2020-04-07 NOTE — TELEPHONE ENCOUNTER
Patient went to clinic. Tested negative for strep and flu but still has a real sore throat. Also her sinus are draining.  Please advise pt. [phone]

## 2020-04-13 ENCOUNTER — TELEPHONE (OUTPATIENT)
Dept: FAMILY MEDICINE CLINIC | Age: 65
End: 2020-04-13

## 2020-04-20 ENCOUNTER — TELEPHONE (OUTPATIENT)
Dept: FAMILY MEDICINE CLINIC | Age: 65
End: 2020-04-20

## 2020-04-20 RX ORDER — BENZONATATE 200 MG/1
200 CAPSULE ORAL 3 TIMES DAILY PRN
Qty: 30 CAPSULE | Refills: 0 | Status: SHIPPED | OUTPATIENT
Start: 2020-04-20 | End: 2020-05-01 | Stop reason: SDUPTHER

## 2020-04-20 RX ORDER — LEVOFLOXACIN 500 MG/1
500 TABLET, FILM COATED ORAL DAILY
Qty: 10 TABLET | Refills: 0 | Status: SHIPPED | OUTPATIENT
Start: 2020-04-20 | End: 2020-04-30

## 2020-04-20 NOTE — TELEPHONE ENCOUNTER
After completing the antibiotic patient states that cough is getting worse and moving into chest. Please advise

## 2020-05-01 ENCOUNTER — VIRTUAL VISIT (OUTPATIENT)
Dept: FAMILY MEDICINE CLINIC | Age: 65
End: 2020-05-01
Payer: COMMERCIAL

## 2020-05-01 VITALS — HEIGHT: 61 IN | WEIGHT: 179 LBS | BODY MASS INDEX: 33.79 KG/M2

## 2020-05-01 PROCEDURE — 99214 OFFICE O/P EST MOD 30 MIN: CPT | Performed by: NURSE PRACTITIONER

## 2020-05-01 RX ORDER — BENZONATATE 200 MG/1
200 CAPSULE ORAL 3 TIMES DAILY PRN
Qty: 30 CAPSULE | Refills: 0 | Status: SHIPPED | OUTPATIENT
Start: 2020-05-01 | End: 2020-08-18

## 2020-05-01 RX ORDER — TRAMADOL HYDROCHLORIDE 50 MG/1
50 TABLET ORAL EVERY 8 HOURS PRN
COMMUNITY
End: 2020-06-30

## 2020-05-01 RX ORDER — UBIDECARENONE 75 MG
1000 CAPSULE ORAL DAILY
COMMUNITY
End: 2020-12-15 | Stop reason: CLARIF

## 2020-05-01 RX ORDER — ALBUTEROL SULFATE 90 UG/1
2 AEROSOL, METERED RESPIRATORY (INHALATION) EVERY 6 HOURS PRN
Qty: 1 INHALER | Refills: 0 | Status: SHIPPED
Start: 2020-05-01 | End: 2020-12-15 | Stop reason: CLARIF

## 2020-05-01 ASSESSMENT — ENCOUNTER SYMPTOMS
WHEEZING: 1
RHINORRHEA: 0
NAUSEA: 0
COUGH: 1
SORE THROAT: 0
DIARRHEA: 0
VOMITING: 0
SHORTNESS OF BREATH: 0

## 2020-05-18 RX ORDER — TRAMADOL HYDROCHLORIDE 50 MG/1
TABLET ORAL
Qty: 90 TABLET | Refills: 0 | OUTPATIENT
Start: 2020-05-18

## 2020-05-18 NOTE — TELEPHONE ENCOUNTER
Medication:   Requested Prescriptions     Pending Prescriptions Disp Refills    traMADol (ULTRAM) 50 MG tablet [Pharmacy Med Name: traMADol HCL 50MG TABLET] 90 tablet 0     Sig: TAKE ONE TABLET BY MOUTH EVERY 8 HOURS AS NEEDED FOR PAIN      Last Filled:  3/30/2020    Patient Phone Number: [phone] (home) [phone] (work)    Last appt: 5/1/2020  Next appt: Visit date not found    Last OARRS:   RX Monitoring 2/7/2020   Attestation -   Acute Pain Prescriptions -   Periodic Controlled Substance Monitoring No signs of potential drug abuse or diversion identified.    Chronic Pain > 80 MEDD -     PDMP Monitoring:    Last PDMP Josse Cintron as Reviewed [facility]):  Review User Review Instant Review Result   Tim Reddy 5/1/2020 10:19 AM Reviewed PDMP [1]     Preferred Pharmacy:   [address] [place] [address]  Phone: [phone] Fax: [phone]

## 2020-05-26 RX ORDER — TRAMADOL HYDROCHLORIDE 50 MG/1
50 TABLET ORAL EVERY 8 HOURS PRN
Qty: 90 TABLET | Refills: 0 | Status: SHIPPED | OUTPATIENT
Start: 2020-05-26 | End: 2020-06-25

## 2020-06-29 NOTE — PROGRESS NOTES
PCP:  Medical Oncology: long  Radiation: grass  Other:        [id number]  STAGE:  IIB right breast cancer      Ms. Cortney Hooker is a 60y.o.-year-old woman who initially presented to me with  right breast cancer. Since her last encounter Ms. Cortney Hooker has been doing well. Her adjuvant treatment has included radiation. She has worked with physical therapy for breast lymphedema in the past.  She has noticed recently with some of her downtime that she is developing more right upper extremity swelling. She has had improved erythema which in the past was suspected to be radiation recall. It is much improved at this point. INTERVAL HISTORY:     On 11/16/2018 she initiated neoadjuvant chemotherapy with ddAC ->T    She completed neoadjuvant chemotherapy in April 2019. On 5/7/2019 she underwent a right partial mastectomy with axillary lymph node dissection. Pathology identified 1.6 cm of grade 3 invasive ductal carcinoma. ER negative [place] negative HER-2 negative. Margins were negative. Disease approach the posterior margin but additional deep margin was taken with dissection was carried to the chest wall. There was 1/11 lymph nodes involved carcinoma.  [id number]. From 6/25/2019 to 8/6/2019 she underwent right breast radiation    On 12/4/2019 she underwent bilateral breast imaging. Postsurgical changes are noted in the right breast.  There is some skin thickening suggested to relate to posttreatment change. There are no new concerning findings suggestive of malignancy in either breast.  BI-RADS 3. On 7/2/2020 she underwent interval right breast imaging. Postsurgical changes are again noted in the upper outer right breast.  These are not substantially changed since December 2019. There are no new suspicious findings concerning for malignancy. BI-RADS 2. Exam:  Physical exam has been reviewed and updated  General: no acute distress  Breast:  The patient was examined in the upright and supine position. There is a well healed scar on the right breast. There is a similarly well healed ipsilateral axillary scar. There are expected  post surgical and radiation related changes. This includes the mild erythema noted as well as some edematous changes. The edematous changes are most significant in the dependent portion of her breast.  She has good range of motion with her arm. Her right arm however is slightly larger and more swollen than the left. This is most notable in her distal upper extremity. Her contralateral breast shows no new masses or changes in breast contour. There were no skin changes of the breast or nipple areolar complex. There was no nipple inversion or discharge. Respiratory: respirations are non-labored and there is no audible distress  Cardiovascular: regular rate, extremities appear well perfused  Neurologic: alert, oriented      Assessment/Plan:  [id number]  STAGE:  IIB right breast cancer  ER/UT/HER2 negative  S/p neoadjuvant chemotherapy  S/p PM with SLNB  S/p XRT    We reviewed her most recent breast imaging and physical exam findings. Signs/symptoms of recurrence were reviewed. She verbalizes understanding that she should notify our office if she identifies any abnormalities on self evaluation as it may require further workup. I encouraged her to continue self breast evaluation. Follow up surveillance was discussed. Lymphedema was reviewed. We discussed remeasuring her upper extremity as well as a compression sleeve. I will redirect her down to physical therapy for that evaluation. We will be happy to provide any prescription based on the recommendations. Our plan at this time is to follow up with surgical breast oncology office in 6 months for clinical exam and bilateral breast imaging. All of the patient's questions were answered at this time however, she was encouraged to call the office with any further inquiries.  Approximately 15 minutes of time were spent in this visit of which 50% or more of the time was related to coordination of care.

## 2020-06-30 RX ORDER — LOSARTAN POTASSIUM 50 MG/1
50 TABLET ORAL DAILY
Qty: 90 TABLET | Refills: 0 | Status: SHIPPED | OUTPATIENT
Start: 2020-06-30 | End: 2020-10-01

## 2020-06-30 RX ORDER — TRAMADOL HYDROCHLORIDE 50 MG/1
TABLET ORAL
Qty: 90 TABLET | Refills: 0 | Status: SHIPPED | OUTPATIENT
Start: 2020-06-30 | End: 2020-08-18 | Stop reason: SDUPTHER

## 2020-06-30 RX ORDER — METFORMIN HYDROCHLORIDE 500 MG/1
TABLET, EXTENDED RELEASE ORAL
Qty: 270 TABLET | Refills: 0 | Status: SHIPPED | OUTPATIENT
Start: 2020-06-30 | End: 2020-10-01

## 2020-07-02 ENCOUNTER — HOSPITAL ENCOUNTER (OUTPATIENT)
Dept: WOMENS IMAGING | Age: 65
Discharge: HOME | End: 2020-07-02
Payer: COMMERCIAL

## 2020-07-02 ENCOUNTER — OFFICE VISIT (OUTPATIENT)
Dept: SURGERY | Age: 65
End: 2020-07-02
Payer: COMMERCIAL

## 2020-07-02 VITALS
OXYGEN SATURATION: 97 % | DIASTOLIC BLOOD PRESSURE: 70 MMHG | WEIGHT: 200 LBS | BODY MASS INDEX: 38.42 KG/M2 | HEART RATE: 96 BPM | SYSTOLIC BLOOD PRESSURE: 120 MMHG

## 2020-07-02 PROCEDURE — G0279 TOMOSYNTHESIS, MAMMO: HCPCS

## 2020-07-02 PROCEDURE — 99213 OFFICE O/P EST LOW 20 MIN: CPT | Performed by: SURGERY

## 2020-07-02 PROCEDURE — 77065 DX MAMMO INCL CAD UNI: CPT

## 2020-07-20 ENCOUNTER — TELEPHONE (OUTPATIENT)
Dept: SURGERY | Age: 65
End: 2020-07-20

## 2020-08-11 ENCOUNTER — TELEPHONE (OUTPATIENT)
Dept: FAMILY MEDICINE CLINIC | Age: 65
End: 2020-08-11

## 2020-08-11 NOTE — TELEPHONE ENCOUNTER
----- Message from Kalen Christianson sent at 8/11/2020  2:59 PM EDT -----  Subject: Message to Provider    QUESTIONS  Information for Provider? Pt called in wanting to know if she should get   blood work done for her routine check up on 08/18. Please call to advise.   ---------------------------------------------------------------------------  --------------  CALL BACK INFO  What is the best way for the office to contact you? OK to leave message on   voicemail  Preferred Call Back Phone Number? [phone]  ---------------------------------------------------------------------------  --------------  SCRIPT ANSWERS  Relationship to Patient?  Self

## 2020-08-14 ENCOUNTER — HOSPITAL ENCOUNTER (OUTPATIENT)
Age: 65
Discharge: HOME | End: 2020-08-14
Payer: COMMERCIAL

## 2020-08-14 LAB
A/G RATIO: 1.4 (ref 1.1–2.2)
ALBUMIN SERPL-MCNC: 4.2 G/DL (ref 3.4–5)
ALP BLD-CCNC: 90 U/L (ref 40–129)
ALT SERPL-CCNC: 23 U/L (ref 10–40)
ANION GAP SERPL CALCULATED.3IONS-SCNC: 11 MMOL/L (ref 3–16)
AST SERPL-CCNC: 22 U/L (ref 15–37)
BASOPHILS ABSOLUTE: 0.1 K/UL (ref 0–0.2)
BASOPHILS RELATIVE PERCENT: 1.4 %
BILIRUB SERPL-MCNC: 0.3 MG/DL (ref 0–1)
BUN BLDV-MCNC: 19 MG/DL (ref 7–20)
CALCIUM SERPL-MCNC: 10 MG/DL (ref 8.3–10.6)
CHLORIDE BLD-SCNC: 103 MMOL/L (ref 99–110)
CHOLESTEROL, FASTING: 132 MG/DL (ref 0–199)
CO2: 26 MMOL/L (ref 21–32)
CREAT SERPL-MCNC: 1 MG/DL (ref 0.6–1.2)
CREATININE URINE: 113.5 MG/DL (ref 28–259)
EOSINOPHILS ABSOLUTE: 0.1 K/UL (ref 0–0.6)
EOSINOPHILS RELATIVE PERCENT: 2.2 %
ESTIMATED AVERAGE GLUCOSE: 159.9 MG/DL
GFR AFRICAN AMERICAN: >60
GFR NON-AFRICAN AMERICAN: 56
GLOBULIN: 3.1 G/DL
GLUCOSE FASTING: 138 MG/DL (ref 70–99)
HBA1C MFR BLD: 7.2 %
HCT VFR BLD CALC: 33.8 % (ref 36–48)
HDLC SERPL-MCNC: 43 MG/DL (ref 40–60)
HEMOGLOBIN: 11.4 G/DL (ref 12–16)
LDL CHOLESTEROL CALCULATED: 55 MG/DL
LYMPHOCYTES ABSOLUTE: 1.5 K/UL (ref 1–5.1)
LYMPHOCYTES RELATIVE PERCENT: 25.1 %
MCH RBC QN AUTO: 30.6 PG (ref 26–34)
MCHC RBC AUTO-ENTMCNC: 33.7 G/DL (ref 31–36)
MCV RBC AUTO: 90.7 FL (ref 80–100)
MICROALBUMIN UR-MCNC: <1.2 MG/DL
MICROALBUMIN/CREAT UR-RTO: NORMAL MG/G (ref 0–30)
MONOCYTES ABSOLUTE: 0.4 K/UL (ref 0–1.3)
MONOCYTES RELATIVE PERCENT: 7.6 %
NEUTROPHILS ABSOLUTE: 3.8 K/UL (ref 1.7–7.7)
NEUTROPHILS RELATIVE PERCENT: 63.7 %
PDW BLD-RTO: 13.1 % (ref 12.4–15.4)
PLATELET # BLD: 233 K/UL (ref 135–450)
PMV BLD AUTO: 8.6 FL (ref 5–10.5)
POTASSIUM SERPL-SCNC: 4.6 MMOL/L (ref 3.5–5.1)
RBC # BLD: 3.73 M/UL (ref 4–5.2)
SODIUM BLD-SCNC: 140 MMOL/L (ref 136–145)
TOTAL PROTEIN: 7.3 G/DL (ref 6.4–8.2)
TRIGLYCERIDE, FASTING: 168 MG/DL (ref 0–150)
VITAMIN D 25-HYDROXY: 77.5 NG/ML
VLDLC SERPL CALC-MCNC: 34 MG/DL
WBC # BLD: 5.9 K/UL (ref 4–11)

## 2020-08-14 PROCEDURE — 82570 ASSAY OF URINE CREATININE: CPT

## 2020-08-14 PROCEDURE — 82607 VITAMIN B-12: CPT

## 2020-08-14 PROCEDURE — 82043 UR ALBUMIN QUANTITATIVE: CPT

## 2020-08-14 PROCEDURE — 80053 COMPREHEN METABOLIC PANEL: CPT

## 2020-08-14 PROCEDURE — 82306 VITAMIN D 25 HYDROXY: CPT

## 2020-08-14 PROCEDURE — 82746 ASSAY OF FOLIC ACID SERUM: CPT

## 2020-08-14 PROCEDURE — 80061 LIPID PANEL: CPT

## 2020-08-14 PROCEDURE — 85025 COMPLETE CBC W/AUTO DIFF WBC: CPT

## 2020-08-14 PROCEDURE — 83036 HEMOGLOBIN GLYCOSYLATED A1C: CPT

## 2020-08-18 ENCOUNTER — OFFICE VISIT (OUTPATIENT)
Dept: FAMILY MEDICINE CLINIC | Age: 65
End: 2020-08-18
Payer: COMMERCIAL

## 2020-08-18 VITALS
HEART RATE: 91 BPM | WEIGHT: 194 LBS | OXYGEN SATURATION: 98 % | SYSTOLIC BLOOD PRESSURE: 126 MMHG | DIASTOLIC BLOOD PRESSURE: 82 MMHG | BODY MASS INDEX: 37.26 KG/M2 | TEMPERATURE: 98.2 F

## 2020-08-18 LAB
FOLATE: >20 NG/ML (ref 4.78–24.2)
VITAMIN B-12: 1142 PG/ML (ref 211–911)

## 2020-08-18 PROCEDURE — 99214 OFFICE O/P EST MOD 30 MIN: CPT | Performed by: NURSE PRACTITIONER

## 2020-08-18 RX ORDER — CITALOPRAM 10 MG/1
TABLET ORAL
Qty: 90 TABLET | Refills: 3 | Status: SHIPPED | OUTPATIENT
Start: 2020-08-18 | End: 2021-09-03 | Stop reason: SDUPTHER

## 2020-08-18 RX ORDER — TRAMADOL HYDROCHLORIDE 50 MG/1
50 TABLET ORAL EVERY 6 HOURS PRN
Qty: 90 TABLET | Refills: 0 | Status: SHIPPED | OUTPATIENT
Start: 2020-08-18 | End: 2020-10-01

## 2020-08-18 RX ORDER — PRAVASTATIN SODIUM 20 MG
TABLET ORAL
Qty: 90 TABLET | Refills: 0 | Status: SHIPPED | OUTPATIENT
Start: 2020-08-18 | End: 2020-11-09

## 2020-08-18 ASSESSMENT — ENCOUNTER SYMPTOMS
COUGH: 0
SHORTNESS OF BREATH: 0
CHEST TIGHTNESS: 0
BLOOD IN STOOL: 0

## 2020-08-18 NOTE — PROGRESS NOTES
TEST VI) 1 Device by In Vitro route.  glucose blood VI test strips ([employer] TEST STRIPS) strip 1 each by In Vitro route daily. As needed. No current facility-administered medications on file prior to visit.        Allergies   Allergen Reactions    Advil Pm [Ibuprofen-Diphenhydramine Cit] Shortness Of Breath     All NSAID    Aspirin Shortness Of Breath    Ibuprofen Shortness Of Breath    Zithromax [Azithromycin Dihydrate] Nausea And Vomiting and Other (See Comments)     Stomach cramping    Lisinopril Other (See Comments)     Angioedema, 2018    Xanax [Alprazolam] Other (See Comments)     Slurred speech  Patient felt \"out of control\"     Past Medical History:   Diagnosis Date    Anxiety and depression     on celexa WELL CONTROLLED AS PER PT 4    Arthritis     feet and mid to lower back    Cancer [facility])     uterine    Diabetes mellitus ([facility]     GERD (gastroesophageal reflux disease)     Hyperlipidemia     Hypertension     Overactive bladder     Sleep apnea     USES CPAP    Type II or unspecified type diabetes mellitus without mention of complication, not stated as uncontrolled     Wears glasses       Past Surgical History:   Procedure Laterality Date    ABDOMEN SURGERY      at age 10 mos--remove tissue    BREAST SURGERY Right     cyst removed    CARPAL TUNNEL RELEASE Right 2017    CARPAL TUNNEL RELEASE Left 2017    Left  Carpal Tunnel Release & Left Ulnar Nerve decompression at the Cubital Tunnel      SECTION      COLONOSCOPY     [facility] Right 2017    Index Finger    HYSTERECTOMY      d/t uterine cancer    KNEE ARTHROSCOPY Right 2017    RIGHT KNEE ARTHROSCOPY, PARTIAL MEDIAL MENISCECTOMY    MASTECTOMY, MODIFIED RADICAL Right 2019    RIGHT LOCALIZED PARTIAL BREAST MASTECTOMY,  RIGHT AXILLARY LYMPH NODE DISSECTION, BIOZORB performed by Maru Deshpande MD at [facility] 2. appearance. She is well-developed. She is obese. Eyes:      Extraocular Movements: Extraocular movements intact. Pupils: Pupils are equal, round, and reactive to light. Cardiovascular:      Rate and Rhythm: Normal rate and regular rhythm. Heart sounds: Normal heart sounds. No murmur. Pulmonary:      Effort: Pulmonary effort is normal.      Breath sounds: Normal breath sounds. Abdominal:      General: Bowel sounds are normal.      Palpations: Abdomen is soft. Tenderness: There is no abdominal tenderness. Musculoskeletal:         General: Swelling and tenderness (bilateral knee) present. Right lower leg: No edema. Left lower leg: No edema. Skin:     General: Skin is warm and dry. Capillary Refill: Capillary refill takes less than 2 seconds. Neurological:      Mental Status: She is alert and oriented to person, place, and time. Psychiatric:         Mood and Affect: Mood normal.         Assessment/Plan    1. Primary osteoarthritis of both knees  Discussed Euflexxa injections vs cortisone injections  - traMADol (ULTRAM) 50 MG tablet; Take 1 tablet by mouth every 6 hours as needed for Pain. Dispense: 90 tablet; Refill: 0  - Rosalva Pierson MD, Orthopaedic Surgery, [facility]    2. Chronic midline low back pain without sciatica  OARRS reviewed  - traMADol (ULTRAM) 50 MG tablet; Take 1 tablet by mouth every 6 hours as needed for Pain. Dispense: 90 tablet; Refill: 0    3. Pure hypercholesterolemia  Reviewed labs  Advised low saturated fat diet  - pravastatin (PRAVACHOL) 20 MG tablet; TAKE ONE TABLET BY MOUTH DAILY  Dispense: 90 tablet; Refill: 0    4. Major depressive disorder with single episode, in full remission ([facility] [address]  Refill sent  - citalopram (CELEXA) 10 MG tablet; TAKE ONE TABLET BY MOUTH DAILY  Dispense: 90 tablet; Refill: 3    5. Screening for osteoporosis  - DEXA BONE DENSITY 2 SITES;  Future      Return in about 3 months (around 11/18/2020) for hypertension re-check, diabetes re-check, medication re-check. This dictation was generated by voice recognition computer software. Although all attempts are made to edit the dictation for accuracy, there may be errors in the transcription that are not intended.

## 2020-08-21 NOTE — TELEPHONE ENCOUNTER
Can you please reach out and see if she has recommendations from PT/OT on pressure for the sleeve? She has been seen downstairs. Do we have premade prescriptions or done on EMR?  Can do whenever we have this info.    leah
Left message advising patient that her physical therapist was out yesterday and is supposed to call me today and advise what pressure her sleeve needs to be.
Patient called stating that she needs an order for a lymphedema sleeve to be placed.  Patient can be reached at [phone]
Patient called today for prescription for arm sleeve.    Has not heard from anyone    Please call patient at [phone]
Patient was given script and information as to where to get her (compression sleeve)from Shira Jordan PT.
[facility]  Hopefully can figure it out with them this week.     leah Rolle
Spoke with patient, she states that she was not given a pressure recommendation. Not a way to do it on EMR, paper rx can be faxed.
Yes

## 2020-09-23 ENCOUNTER — TELEPHONE (OUTPATIENT)
Dept: FAMILY MEDICINE CLINIC | Age: 65
End: 2020-09-23

## 2020-09-23 NOTE — TELEPHONE ENCOUNTER
Please call the supply store at [phone] and find out what supplies/styles/sizes, etc for Rashad Murguia and let them know we will be reordering.

## 2020-09-24 NOTE — TELEPHONE ENCOUNTER
Called and spoke with [facility]- will sed over a order form for CPAP supplies. Will need to call patient to schedule an appointment to discuss CPAP due to [facility] needing it for insurance purposes.

## 2020-09-25 ENCOUNTER — OFFICE VISIT (OUTPATIENT)
Dept: FAMILY MEDICINE CLINIC | Age: 65
End: 2020-09-25
Payer: COMMERCIAL

## 2020-09-25 VITALS
WEIGHT: 188.8 LBS | DIASTOLIC BLOOD PRESSURE: 80 MMHG | SYSTOLIC BLOOD PRESSURE: 120 MMHG | OXYGEN SATURATION: 97 % | BODY MASS INDEX: 36.27 KG/M2 | TEMPERATURE: 97.7 F | HEART RATE: 73 BPM

## 2020-09-25 PROCEDURE — 99213 OFFICE O/P EST LOW 20 MIN: CPT | Performed by: NURSE PRACTITIONER

## 2020-09-25 ASSESSMENT — ENCOUNTER SYMPTOMS
COUGH: 0
SHORTNESS OF BREATH: 0
CHEST TIGHTNESS: 0

## 2020-09-25 NOTE — PROGRESS NOTES
Sherice Walker  : 1955  Encounter date: 2020    This jeromy 63 y.o. female who presents with  Chief Complaint   Patient presents with    Other     Patient presents today to discuss CPAP. History of present illness:    HPI Pt is 63year old female with JHONNY, has been using CPAP for 7-8 years. Pt reports symptoms are well controlled with machine. Pt is due for supply reorder. Reports cleansing machine as ordered. Denies current concerns. Pt denies daytime somnolence, BP well controlled, averages 5-7 hours sleep nightly. Pt reports ordering supplies 2-3 times per year. Reports hose is ill formed and feels as if she is not getting adequate air flow/pressure. Reviewed recent labs. Current Outpatient Medications on File Prior to Visit   Medication Sig Dispense Refill    CPAP Machine MISC by Does not apply route      traMADol (ULTRAM) 50 MG tablet Take 1 tablet by mouth every 6 hours as needed for Pain.  90 tablet 0    pravastatin (PRAVACHOL) 20 MG tablet TAKE ONE TABLET BY MOUTH DAILY 90 tablet 0    citalopram (CELEXA) 10 MG tablet TAKE ONE TABLET BY MOUTH DAILY 90 tablet 3    metFORMIN (GLUCOPHAGE-XR) 500 MG extended release tablet TAKE ONE TABLET BY MOUTH THREE TIMES A DAY WITH MEALS 270 tablet 0    losartan (COZAAR) 50 MG tablet Take 1 tablet by mouth daily 90 tablet 0    vitamin B-12 (CYANOCOBALAMIN) 100 MCG tablet Take 1,000 mcg by mouth daily Energy B12      diclofenac 1.5 % SOLN APPLY 2 SQUIRTS TO THE AFFECTED AREA TWO TIMES A  mL 1    albuterol sulfate HFA (PROVENTIL HFA) 108 (90 Base) MCG/ACT inhaler Inhale 2 puffs into the lungs every 6 hours as needed for Wheezing 1 Inhaler 0    Spacer/Aero-Holding Chambers TORRES 1 Device by Does not apply route daily as needed (To be used with spacer) 1 Device 0    oxybutynin (DITROPAN) 5 MG tablet Take 1 tablet by mouth 2 times daily 180 tablet 3    omeprazole (PRILOSEC) 40 MG delayed release capsule One daily 90 capsule 3    HYSTERECTOMY      d/t uterine cancer    KNEE ARTHROSCOPY Right 2017    RIGHT KNEE ARTHROSCOPY, PARTIAL MEDIAL MENISCECTOMY    MASTECTOMY, MODIFIED RADICAL Right 2019    RIGHT LOCALIZED PARTIAL BREAST MASTECTOMY,  RIGHT AXILLARY LYMPH NODE DISSECTION, BIOZORB performed by Dedrick Sweeney MD at [facility]. Right 2017    UPPER GASTROINTESTINAL ENDOSCOPY        Family History   Problem Relation Age of Onset    COPD Mother     Heart Disease Father     Cancer Other     Diabetes Other     Heart Disease Other     High Blood Pressure Other     Seizures Other     Diabetes Sister       Social History     Tobacco Use    Smoking status: Former Smoker     Packs/day: 0.25     Years: 10.00     Pack years: 2.50     Types: Cigarettes     Last attempt to quit: 1995     Years since quittin.4    Smokeless tobacco: Never Used   Substance Use Topics    Alcohol use: No     Alcohol/week: 0.0 standard drinks        Review of Systems   Constitutional: Negative for activity change, appetite change, fatigue and unexpected weight change. Eyes: Negative for visual disturbance. Respiratory: Negative for cough, chest tightness and shortness of breath. Cardiovascular: Negative for chest pain, palpitations and leg swelling. Neurological: Negative for headaches. Psychiatric/Behavioral: Negative for sleep disturbance.        Objective:    /80 (Site: Left Upper Arm, Position: Sitting, Cuff Size: Large Adult)   Pulse 73   Temp 97.7 °F (36.5 °C)   Wt 188 lb 12.8 oz (85.6 kg)   SpO2 97%   BMI 36.27 kg/m²   Weight: 188 lb 12.8 oz (85.6 kg)     BP Readings from Last 3 Encounters:   20 120/80   20 126/82   20 120/70     Wt Readings from Last 3 Encounters:   20 188 lb 12.8 oz (85.6 kg)   20 194 lb (88 kg)   20 200 lb (90.7 kg)     BMI Readings from Last 3 Encounters:   20 36.27 kg/m²   20 37.26 kg/m²   07/02/20 38.42 kg/m²       Physical Exam  Vitals signs reviewed. Constitutional:       Appearance: Normal appearance. She is well-developed. She is obese. Cardiovascular:      Rate and Rhythm: Normal rate and regular rhythm. Heart sounds: Normal heart sounds. No murmur. Pulmonary:      Effort: Pulmonary effort is normal.      Breath sounds: Normal breath sounds. Abdominal:      General: Bowel sounds are normal.      Palpations: Abdomen is soft. Musculoskeletal:      Right lower leg: No edema. Left lower leg: No edema. Skin:     General: Skin is warm and dry. Neurological:      Mental Status: She is alert and oriented to person, place, and time. Psychiatric:         Mood and Affect: Mood normal.         Assessment/Plan    1. Obstructive sleep apnea syndrome  Continue CPAP nightly   Forms completed and sent to Supply      Return in about 3 months (around 12/25/2020) for medication re-check. This dictation was generated by voice recognition computer software. Although all attempts are made to edit the dictation for accuracy, there may be errors in the transcription that are not intended.

## 2020-10-05 RX ORDER — OMEPRAZOLE 40 MG/1
CAPSULE, DELAYED RELEASE ORAL
Qty: 90 CAPSULE | Refills: 3 | Status: SHIPPED | OUTPATIENT
Start: 2020-10-05 | End: 2021-03-16 | Stop reason: SDUPTHER

## 2020-10-12 ENCOUNTER — OFFICE VISIT (OUTPATIENT)
Dept: PRIMARY CARE CLINIC | Age: 65
End: 2020-10-12
Payer: COMMERCIAL

## 2020-10-12 PROCEDURE — 99211 OFF/OP EST MAY X REQ PHY/QHP: CPT | Performed by: NURSE PRACTITIONER

## 2020-10-12 NOTE — PATIENT INSTRUCTIONS

## 2020-10-12 NOTE — PROGRESS NOTES
Ilana Swanson received a viral test for COVID-19. They were educated on isolation and quarantine as appropriate. For any symptoms, they were directed to seek care from their PCP, given contact information to establish with a doctor, directed to an urgent care or the emergency room.

## 2020-10-12 NOTE — PROGRESS NOTES
Patient not reached. Preop instructions left on voice mail. Number_[phone]________      DATE_10/16/2020_______ TIME_1300_______ARRIVAL___1130  FEC______      Nothing to eat or drink after midnight the night before,except for what the prep instructions call for. If you do not have the instructions or do not understand them please contact your doctors office. Follow any instructions your doctors office has given you including what medications to take the AM of your procedure and which ones to hold. You may use your inhalers. If you take a long acting insulin the sydnie prior please cut the dose in half and take no diabetic medications that AM.Follow specific doctors office instructions regarding blood thinners and if they want you to hold and for how long. If you are on a blood thinner and have no instructions please contact the office and ask. Dress comfortably,bring your insurance card,picture ID,and a complete list of medications, including supplements. You must have a responsible adult to stay with you during the procedure,drive you home and stay with you. [facility] phone number [phone] for any questions. There is a one visitor policy at [facility] for all surgeries and endoscopies. Whether the visitor can stay or will be asked to wait in the car will depend on the current policy and if social distancing can be maintained. The policy is subject to change at any time. Please make sure the visitor has a cell phone that is on,charged and able to accept calls, as this may be the way that the staff communicates with them. Pain management is NO VISITOR policyThe patients ride is expected to remain in the car with a cell phone for communication. If the ride is leaving the hospital grounds please make sure they are back in time for pickup. Have the patient inform the staff on arrival what their rides plans are while the patient is in the facility. At the MAIN there is one visitor allowed. Please note that the visitor policy is subject to change. Patient instructed to get their COVID-19 test done as directed by their doctor (5-7 days prior to procedure)  or patient states will get on _10/12/2020  MFF_________. Patient was notified that they need to have an appointment,number to call provided. The day the COVID test is done is considered day one. Instructed to self quarantine after test until DOS.

## 2020-10-13 LAB — SARS-COV-2, NAA: NOT DETECTED

## 2020-10-13 NOTE — RESULT ENCOUNTER NOTE
Your Covid-10 teste resulted not detected/negative. What happens if I have a negative test?    Remember to wash your hands often, avoid touching your face, stay 6 feet from people you do not live with, and wear a cloth facemask when you go out in public. A negative COVID-19 test at one point in time does not mean you will stay negative. You could become ill with COVID-19 and/or test positive at any time. If you are a close contact of a confirmed or suspected case, continue to stay home and away from others until 14 days after your last exposure. If you do not have symptoms, and were not in close contact with a confirmed or suspected case, you can stop isolating. If you currently have symptoms of COVID-19, and were not in close contact with a confirmed or suspected case, you should keep monitoring symptoms and talk to your doctor or other healthcare provider about staying home and if you need to get tested again. If you develop symptoms of COVID-19, stay at home and away from others and talk to your doctor or other healthcare provider about getting tested again. For additional information, visit coronavirus. [place].gov. For answers to your COVID-19 questions, call 2-120-3-ASK-[facility] ([phone]).

## 2020-10-16 ENCOUNTER — HOSPITAL ENCOUNTER (OUTPATIENT)
Age: 65
Setting detail: OUTPATIENT SURGERY
Discharge: HOME | End: 2020-10-16
Attending: INTERNAL MEDICINE | Admitting: INTERNAL MEDICINE
Payer: COMMERCIAL

## 2020-10-16 ENCOUNTER — ANESTHESIA (OUTPATIENT)
Dept: ENDOSCOPY | Age: 65
End: 2020-10-16
Payer: COMMERCIAL

## 2020-10-16 ENCOUNTER — ANESTHESIA EVENT (OUTPATIENT)
Dept: ENDOSCOPY | Age: 65
End: 2020-10-16
Payer: COMMERCIAL

## 2020-10-16 VITALS
WEIGHT: 185 LBS | RESPIRATION RATE: 16 BRPM | OXYGEN SATURATION: 99 % | SYSTOLIC BLOOD PRESSURE: 141 MMHG | TEMPERATURE: 97.6 F | HEIGHT: 61 IN | HEART RATE: 84 BPM | DIASTOLIC BLOOD PRESSURE: 86 MMHG | BODY MASS INDEX: 34.93 KG/M2

## 2020-10-16 VITALS — DIASTOLIC BLOOD PRESSURE: 66 MMHG | OXYGEN SATURATION: 100 % | SYSTOLIC BLOOD PRESSURE: 129 MMHG

## 2020-10-16 LAB
GLUCOSE BLD-MCNC: 135 MG/DL (ref 70–99)
GLUCOSE BLD-MCNC: 151 MG/DL (ref 70–99)
PERFORMED ON: ABNORMAL
PERFORMED ON: ABNORMAL

## 2020-10-16 PROCEDURE — 6360000002 HC RX W HCPCS: Performed by: NURSE ANESTHETIST, CERTIFIED REGISTERED

## 2020-10-16 PROCEDURE — 3700000001 HC ADD 15 MINUTES (ANESTHESIA): Performed by: INTERNAL MEDICINE

## 2020-10-16 PROCEDURE — 3700000000 HC ANESTHESIA ATTENDED CARE: Performed by: INTERNAL MEDICINE

## 2020-10-16 PROCEDURE — 2580000003 HC RX 258: Performed by: FAMILY MEDICINE

## 2020-10-16 PROCEDURE — 6370000000 HC RX 637 (ALT 250 FOR IP): Performed by: INTERNAL MEDICINE

## 2020-10-16 PROCEDURE — 3609010600 HC COLONOSCOPY POLYPECTOMY SNARE/COLD BIOPSY: Performed by: INTERNAL MEDICINE

## 2020-10-16 PROCEDURE — 7100000011 HC PHASE II RECOVERY - ADDTL 15 MIN: Performed by: INTERNAL MEDICINE

## 2020-10-16 PROCEDURE — 3609012400 HC EGD TRANSORAL BIOPSY SINGLE/MULTIPLE: Performed by: INTERNAL MEDICINE

## 2020-10-16 PROCEDURE — 7100000010 HC PHASE II RECOVERY - FIRST 15 MIN: Performed by: INTERNAL MEDICINE

## 2020-10-16 PROCEDURE — 2709999900 HC NON-CHARGEABLE SUPPLY: Performed by: INTERNAL MEDICINE

## 2020-10-16 PROCEDURE — 2500000003 HC RX 250 WO HCPCS: Performed by: NURSE ANESTHETIST, CERTIFIED REGISTERED

## 2020-10-16 RX ORDER — KETAMINE HYDROCHLORIDE 10 MG/ML
INJECTION, SOLUTION INTRAMUSCULAR; INTRAVENOUS PRN
Status: DISCONTINUED | OUTPATIENT
Start: 2020-10-16 | End: 2020-10-16 | Stop reason: SDUPTHER

## 2020-10-16 RX ORDER — LIDOCAINE HYDROCHLORIDE 20 MG/ML
INJECTION, SOLUTION EPIDURAL; INFILTRATION; INTRACAUDAL; PERINEURAL PRN
Status: DISCONTINUED | OUTPATIENT
Start: 2020-10-16 | End: 2020-10-16 | Stop reason: SDUPTHER

## 2020-10-16 RX ORDER — SODIUM CHLORIDE 9 MG/ML
INJECTION, SOLUTION INTRAVENOUS CONTINUOUS
Status: DISCONTINUED | OUTPATIENT
Start: 2020-10-16 | End: 2020-10-16 | Stop reason: HOSPADM

## 2020-10-16 RX ORDER — PROPOFOL 10 MG/ML
INJECTION, EMULSION INTRAVENOUS PRN
Status: DISCONTINUED | OUTPATIENT
Start: 2020-10-16 | End: 2020-10-16 | Stop reason: SDUPTHER

## 2020-10-16 RX ORDER — GLYCOPYRROLATE 1 MG/5 ML
SYRINGE (ML) INTRAVENOUS PRN
Status: DISCONTINUED | OUTPATIENT
Start: 2020-10-16 | End: 2020-10-16 | Stop reason: SDUPTHER

## 2020-10-16 RX ADMIN — PROPOFOL 30 MG: 10 INJECTION, EMULSION INTRAVENOUS at 12:58

## 2020-10-16 RX ADMIN — PROPOFOL 10 MG: 10 INJECTION, EMULSION INTRAVENOUS at 13:09

## 2020-10-16 RX ADMIN — PROPOFOL 10 MG: 10 INJECTION, EMULSION INTRAVENOUS at 13:10

## 2020-10-16 RX ADMIN — Medication 0.2 MG: at 12:52

## 2020-10-16 RX ADMIN — SODIUM CHLORIDE: 9 INJECTION, SOLUTION INTRAVENOUS at 12:47

## 2020-10-16 RX ADMIN — PROPOFOL 20 MG: 10 INJECTION, EMULSION INTRAVENOUS at 13:03

## 2020-10-16 RX ADMIN — SODIUM CHLORIDE: 9 INJECTION, SOLUTION INTRAVENOUS at 12:41

## 2020-10-16 RX ADMIN — PROPOFOL 30 MG: 10 INJECTION, EMULSION INTRAVENOUS at 13:02

## 2020-10-16 RX ADMIN — PROPOFOL 10 MG: 10 INJECTION, EMULSION INTRAVENOUS at 13:08

## 2020-10-16 RX ADMIN — PROPOFOL 20 MG: 10 INJECTION, EMULSION INTRAVENOUS at 13:05

## 2020-10-16 RX ADMIN — PROPOFOL 10 MG: 10 INJECTION, EMULSION INTRAVENOUS at 13:13

## 2020-10-16 RX ADMIN — PROPOFOL 30 MG: 10 INJECTION, EMULSION INTRAVENOUS at 13:06

## 2020-10-16 RX ADMIN — PROPOFOL 30 MG: 10 INJECTION, EMULSION INTRAVENOUS at 13:04

## 2020-10-16 RX ADMIN — KETAMINE HYDROCHLORIDE 10 MG: 10 INJECTION, SOLUTION INTRAMUSCULAR; INTRAVENOUS at 12:55

## 2020-10-16 RX ADMIN — PROPOFOL 20 MG: 10 INJECTION, EMULSION INTRAVENOUS at 13:07

## 2020-10-16 RX ADMIN — PROPOFOL 20 MG: 10 INJECTION, EMULSION INTRAVENOUS at 12:57

## 2020-10-16 RX ADMIN — PROPOFOL 60 MG: 10 INJECTION, EMULSION INTRAVENOUS at 12:55

## 2020-10-16 RX ADMIN — PROPOFOL 10 MG: 10 INJECTION, EMULSION INTRAVENOUS at 13:11

## 2020-10-16 RX ADMIN — PROPOFOL 20 MG: 10 INJECTION, EMULSION INTRAVENOUS at 12:59

## 2020-10-16 RX ADMIN — KETAMINE HYDROCHLORIDE 10 MG: 10 INJECTION, SOLUTION INTRAMUSCULAR; INTRAVENOUS at 12:56

## 2020-10-16 RX ADMIN — LIDOCAINE HYDROCHLORIDE 80 MG: 20 INJECTION, SOLUTION EPIDURAL; INFILTRATION; INTRACAUDAL; PERINEURAL at 12:52

## 2020-10-16 RX ADMIN — PROPOFOL 30 MG: 10 INJECTION, EMULSION INTRAVENOUS at 12:56

## 2020-10-16 RX ADMIN — PROPOFOL 20 MG: 10 INJECTION, EMULSION INTRAVENOUS at 13:01

## 2020-10-16 RX ADMIN — PROPOFOL 30 MG: 10 INJECTION, EMULSION INTRAVENOUS at 13:00

## 2020-10-16 ASSESSMENT — PAIN DESCRIPTION - DESCRIPTORS
DESCRIPTORS: BURNING

## 2020-10-16 ASSESSMENT — PAIN SCALES - GENERAL
PAINLEVEL_OUTOF10: 1
PAINLEVEL_OUTOF10: 3
PAINLEVEL_OUTOF10: 1

## 2020-10-16 ASSESSMENT — PAIN DESCRIPTION - LOCATION
LOCATION: THROAT

## 2020-10-16 ASSESSMENT — PAIN DESCRIPTION - PAIN TYPE
TYPE: ACUTE PAIN

## 2020-10-16 ASSESSMENT — PAIN - FUNCTIONAL ASSESSMENT: PAIN_FUNCTIONAL_ASSESSMENT: 0-10

## 2020-10-16 NOTE — ANESTHESIA POSTPROCEDURE EVALUATION
Department of Anesthesiology  Postprocedure Note    Patient: Massiel Roque  MRN: [id number]  YOB: 1955  Date of evaluation: 10/16/2020  Time:  1:33 PM     Procedure Summary     Date:  10/16/20 Room / Location:  [address] [place] [address]    Anesthesia Start:  1250 Anesthesia Stop:  1326    Procedures:       EGD BIOPSY (N/A Abdomen)      COLONOSCOPY POLYPECTOMY SNARE/COLD BIOPSY Diagnosis:  (ANEMIA D64.9)    Surgeon:  Felipe Hernandez MD Responsible Provider:  Yobany Kapoor MD    Anesthesia Type:  MAC ASA Status:  2          Anesthesia Type: MAC    Amber Phase I: Amber Score: 10    Amber Phase II:      Last vitals: Reviewed and per EMR flowsheets.        Anesthesia Post Evaluation    Level of consciousness: awake  Complications: no

## 2020-10-16 NOTE — ANESTHESIA PRE PROCEDURE
Department of Anesthesiology  Preprocedure Note       Name:  Bob Olivo   Age:  63 y.o.  :  1955                                          MRN:  [id number]         Date:  10/16/2020      Surgeon: Tawanna Morton):  Chinmay Puckett MD    Procedure: Procedure(s):  EGD DIAGNOSTIC ONLY  COLONOSCOPY DIAGNOSTIC    Medications prior to admission:       Current medications:    No current facility-administered medications for this encounter. Allergies:     Allergies   Allergen Reactions    Advil Pm [Ibuprofen-Diphenhydramine Cit] Shortness Of Breath     All NSAID    Aspirin Shortness Of Breath    Ibuprofen Shortness Of Breath    Zithromax [Azithromycin Dihydrate] Nausea And Vomiting and Other (See Comments)     Stomach cramping    Lisinopril Other (See Comments)     Angioedema, 2018    Xanax [Alprazolam] Other (See Comments)     Slurred speech  Patient felt \"out of control\"       Problem List:    Patient Active Problem List   Diagnosis Code    Sleep apnea G47.30    Esophageal reflux K21.9    Pure hypercholesterolemia E78.00    Other and unspecified hyperlipidemia E78.5    Hypertonicity of bladder N31.8    Malaise and fatigue R53.81, R53.83    Symptomatic menopausal or female climacteric states N95.1    Anemia D64.9    Type 2 diabetes mellitus without complication (HCC) H78.8    Depression F32.9    Midline low back pain without sciatica M54.5    Primary osteoarthritis of both knees M17.0    History of uterine cancer Z85.42    Bilateral carpal tunnel syndrome G56.03    Chronic seasonal allergic rhinitis due to pollen J30.1    Intrinsic eczema L20.84    Malignant neoplasm of upper-outer quadrant of right breast in female, estrogen receptor negative (HCC) C50.411, Z17.1    Abnormal mammogram R92.8    Primary breast malignancy, right ([address] C50.911       Past Medical History:        Diagnosis Date    Anxiety and depression     on celexa WELL CONTROLLED AS PER PT 17    Arthritis     feet Readings from Last 3 Encounters:   09/25/20 188 lb 12.8 oz (85.6 kg)   08/18/20 194 lb (88 kg)   07/02/20 200 lb (90.7 kg)     There is no height or weight on file to calculate BMI.    CBC:   Lab Results   Component Value Date    WBC 5.9 08/14/2020    RBC 3.73 08/14/2020    HGB 11.4 08/14/2020    HCT 33.8 08/14/2020    MCV 90.7 08/14/2020    RDW 13.1 08/14/2020     08/14/2020       CMP:   Lab Results   Component Value Date     08/14/2020    K 4.6 08/14/2020     08/14/2020    CO2 26 08/14/2020    BUN 19 08/14/2020    CREATININE 1.0 08/14/2020    GFRAA >60 08/14/2020    GFRAA >60 05/29/2013    AGRATIO 1.4 08/14/2020    LABGLOM 56 08/14/2020    GLUCOSE 188 08/13/2019    PROT 7.3 08/14/2020    PROT 7.5 11/28/2012    CALCIUM 10.0 08/14/2020    BILITOT 0.3 08/14/2020    ALKPHOS 90 08/14/2020    AST 22 08/14/2020    ALT 23 08/14/2020       POC Tests: No results for input(s): POCGLU, POCNA, POCK, POCCL, POCBUN, POCHEMO, POCHCT in the last 72 hours.     Coags:   Lab Results   Component Value Date    PROTIME 11.4 04/30/2019    INR 1.00 04/30/2019    APTT 30.8 11/12/2018       HCG (If Applicable): No results found for: PREGTESTUR, PREGSERUM, HCG, HCGQUANT     ABGs: No results found for: PHART, PO2ART, DQZ9HRG, KHV1UDV, BEART, I6LRSFWU     Type & Screen (If Applicable):  No results found for: LABABO, LABRH    Drug/Infectious Status (If Applicable):  No results found for: HIV, HEPCAB    COVID-19 Screening (If Applicable):   Lab Results   Component Value Date    COVID19 NOT DETECTED 10/12/2020         Anesthesia Evaluation    Airway: Mallampati: II  TM distance: >3 FB   Neck ROM: full  Mouth opening: > = 3 FB Dental:          Pulmonary:   (+) sleep apnea:                             Cardiovascular:    (+) hypertension:,         Rhythm: regular  Rate: normal                    Neuro/Psych:   (+) neuromuscular disease:, psychiatric history:            GI/Hepatic/Renal:   (+) GERD:,           Endo/Other:    (+) Diabetes, . Abdominal:           Vascular:                                        Anesthesia Plan      MAC     ASA 2       Induction: intravenous. Anesthetic plan and risks discussed with patient. Plan discussed with CRNA.                   Reggie Mcguire MD   10/16/2020

## 2020-10-16 NOTE — PROGRESS NOTES
Discharge instructions reviewed with patient/responsible adult. All home medications have been reviewed, questions answered and patient verbalized understanding. Discharge instructions signed and copies given. Patient discharged via wheelchair to home with belongings. Post-procedure education reviewed with patient and visitor, and they verbalized understanding.

## 2020-10-16 NOTE — BRIEF OP NOTE
Brief Postoperative Note      Patient: Sally Frazier  YOB: 1955  MRN: [id number]    Date of Procedure: 10/16/2020    Pre-Op Diagnosis: ANEMIA D64.9       Procedure(s):  EGD BIOPSY  COLONOSCOPY POLYPECTOMY SNARE/COLD BIOPSY    Surgeon(s):  Shan Jones MD    Anesthesia: Monitor Anesthesia Care    Estimated Blood Loss (mL): Minimal    Complications: None    Specimens:   ID Type Source Tests Collected by Time Destination   A : A. Duodenum Bx -  r/o Celiac Tissue Tissue SURGICAL PATHOLOGY Shan Jones MD 10/16/2020 1302    B : B. Gastric Bx -- r/o H. Pylori Tissue Tissue SURGICAL PATHOLOGY Shan Jones MD 10/16/2020 1307    C : C.  Z-Line Bx -- r/o Barretts Tissue Tissue SURGICAL PATHOLOGY Shan Jones MD 10/16/2020 1309    D : D. Sigmoid Polyp x 1  (cold) Tissue Tissue SURGICAL PATHOLOGY Shan Jones MD 10/16/2020 1318          Findings:   Irregular Z-line, biopsied to evaluate for BE. Normal gastric and duodenal mucosa, biopsied. Bile in stomach. Diminutive sigmoid polyp, cold snared. Sigmoid diverticulosis. Hemorrhoids      Plans  Await path   Continue PPI   Increase water and fiber intake (>30 grams/day).    Recall colonoscopy in 5 years     Electronically signed by Shan Jones MD on 10/16/2020 at 1:21 PM

## 2020-10-16 NOTE — H&P
[address] [place] [address]   Pre-operative History and Physical    Patient: Kevin Pillai  : 1955  Acct#:     History Obtained From:  patient    HISTORY OF PRESENT ILLNESS:    The patient is a 63 y.o. female with unexplained anemia f  who presents with for EGD & colonoscopy. Past Medical History:        Diagnosis Date    Anxiety and depression     on celexa WELL CONTROLLED AS PER PT 4-11-17    Arthritis     feet and mid to lower back    Cancer [facility])     uterine, right breast    Diabetes mellitus ([address]     GERD (gastroesophageal reflux disease)     Hyperlipidemia     Hypertension     Overactive bladder     Sleep apnea     USES CPAP    Type II or unspecified type diabetes mellitus without mention of complication, not stated as uncontrolled     Wears glasses      Past Surgical History:        Procedure Laterality Date    ABDOMEN SURGERY      at age 10 mos--remove tissue    BREAST SURGERY Right     cyst removed    CARPAL TUNNEL RELEASE Right 2017    CARPAL TUNNEL RELEASE Left 2017    Left  Carpal Tunnel Release & Left Ulnar Nerve decompression at the Cubital Tunnel      SECTION      COLONOSCOPY     [place] Right 2017    Index Finger    HYSTERECTOMY      d/t uterine cancer    KNEE ARTHROSCOPY Right 2017    RIGHT KNEE ARTHROSCOPY, PARTIAL MEDIAL MENISCECTOMY    MASTECTOMY, MODIFIED RADICAL Right 2019    RIGHT LOCALIZED PARTIAL BREAST MASTECTOMY,  RIGHT AXILLARY LYMPH NODE DISSECTION, BIOZORB performed by Josh Cazares MD at [address] Right     TONSILLECTOMY      ULNAR TUNNEL RELEASE Right 2017    UPPER GASTROINTESTINAL ENDOSCOPY       Medications Prior to Admission:   No current facility-administered medications on file prior to encounter.       Current Outpatient Medications on File Prior to Encounter   Medication Sig Dispense Refill    pravastatin (PRAVACHOL) 20 MG tablet TAKE ONE hepatosplenomegally      ASA Grade:  ASA 2 - Patient with mild systemic disease with no functional limitations    Mallampati Class:  Class I: Soft palate, uvula, fauces, pillars visible  __________  Class II: Soft palate, uvula, fauces visible  ___X_____   Class III: Soft palate, base of uvula visible  __________  Class IV: Hard palate only visible   __________      ASSESSMENT AND PLAN:    1. Patient is a 63 y.o. female here for EGD & COLONOSCOPY with deep sedation  2. Procedure options, risks and benefits reviewed with patient. Patient expresses understanding.       Kedar Garsia MD  [facility]

## 2020-11-09 RX ORDER — PRAVASTATIN SODIUM 20 MG
TABLET ORAL
Qty: 90 TABLET | Refills: 0 | Status: SHIPPED | OUTPATIENT
Start: 2020-11-09 | End: 2021-02-03

## 2020-11-16 ENCOUNTER — OFFICE VISIT (OUTPATIENT)
Dept: ORTHOPEDIC SURGERY | Age: 65
End: 2020-11-16
Payer: COMMERCIAL

## 2020-11-16 VITALS — HEIGHT: 61 IN | WEIGHT: 183 LBS | BODY MASS INDEX: 34.55 KG/M2

## 2020-11-16 PROCEDURE — 99203 OFFICE O/P NEW LOW 30 MIN: CPT | Performed by: ORTHOPAEDIC SURGERY

## 2020-11-16 NOTE — PROGRESS NOTES
Date:  2020    Name:  Elaine Salinas  Address:  [address] [place] [address]. [phone]    :  1955      Age:   63 y.o.    SSN:  [id number]      Medical Record Number:  [id number]    Reason for Visit:    Chief Complaint    Knee Pain (New patient -- right knee pain -- fell 2016 in bath tub and had surg for torn meniscus -- pain returned the last 4-5 months no recent or new injury)      DOS:2020     HPI: Raj Melgoza is a 63 y.o. female here today for evaluation of her right knee. Patient states that she has a several year history of right knee pain without trauma. She states that her pain is worse now with walking or going up and down stairs. She has been treated by Dr. Chris Correa in the past and has received both cortisone and hyaluronic acid injections into the knee with good success. She is unable to take any NSAIDs or aspirin due to allergies. She is also had good success with Voltaren cream about the knee. She denies any locking, catching, popping or giving way episodes. Pain Assessment  Location of Pain: Knee  Location Modifiers: Right  Severity of Pain: 5  Quality of Pain: Sharp, Aching  Duration of Pain: Persistent  Frequency of Pain: Intermittent  Date Pain First Started: 20  Aggravating Factors: Walking, Other (Comment)(driving)  Limiting Behavior: Some  Relieving Factors: Rest  Result of Injury: No  Work-Related Injury: No  Are there other pain locations you wish to document?: No  ROS: All systems reviewed on patient intake form. Pertinent items are noted in HPI.         Past Medical History:   Diagnosis Date    Anxiety and depression     on celexa WELL CONTROLLED AS PER PT 4-17    Arthritis     feet and mid to lower back    Cancer [facility]) 2007    uterine, right breast    Diabetes mellitus ([facility]     GERD (gastroesophageal reflux disease)     Hyperlipidemia     Hypertension     Overactive bladder     Sleep apnea     USES CPAP    Type II or Pack years: 2.50     Types: Cigarettes     Last attempt to quit: 1995     Years since quittin.6    Smokeless tobacco: Never Used   Substance and Sexual Activity    Alcohol use: No     Alcohol/week: 0.0 standard drinks    Drug use: No    Sexual activity: Never   Lifestyle    Physical activity     Days per week: None     Minutes per session: None    Stress: None   Relationships    Social connections     Talks on phone: None     Gets together: None     Attends Hinduism service: None     Active member of club or organization: None     Attends meetings of clubs or organizations: None     Relationship status: None    Intimate partner violence     Fear of current or ex partner: None     Emotionally abused: None     Physically abused: None     Forced sexual activity: None   Other Topics Concern    None   Social History Narrative    None       Current Outpatient Medications   Medication Sig Dispense Refill    pravastatin (PRAVACHOL) 20 MG tablet TAKE ONE TABLET BY MOUTH DAILY 90 tablet 0    omeprazole (PRILOSEC) 40 MG delayed release capsule TAKE ONE CAPSULE BY MOUTH DAILY 90 capsule 3    losartan (COZAAR) 50 MG tablet TAKE ONE TABLET BY MOUTH DAILY 90 tablet 0    metFORMIN (GLUCOPHAGE-XR) 500 MG extended release tablet TAKE ONE TABLET BY MOUTH THREE TIMES A DAY WITH A MEAL 270 tablet 0    CPAP Machine MISC by Does not apply route      citalopram (CELEXA) 10 MG tablet TAKE ONE TABLET BY MOUTH DAILY 90 tablet 3    vitamin B-12 (CYANOCOBALAMIN) 100 MCG tablet Take 1,000 mcg by mouth daily Energy B12      diclofenac 1.5 % SOLN APPLY 2 SQUIRTS TO THE AFFECTED AREA TWO TIMES A  mL 1    albuterol sulfate HFA (PROVENTIL HFA) 108 (90 Base) MCG/ACT inhaler Inhale 2 puffs into the lungs every 6 hours as needed for Wheezing 1 Inhaler 0    Spacer/Aero-Holding Chambers TORRES 1 Device by Does not apply route daily as needed (To be used with spacer) 1 Device 0    oxybutynin (DITROPAN) 5 MG tablet Take Neurologic and vascular: Skin is warm and well-perfused. Sensation is intact to light-touch. Special tests: Negative Brandy sign. Diagnostics:  Radiology:     Radiographs were obtained and reviewed in the office; 3 views: bilateral PA, bilateral Merchants AND right lateral    Impression: Mild varus angulation with narrowing of the medial tibiofemoral compartment. There is a small osteophyte over the medial tibial plateau. Narrowing and osteophyte formation of the patellofemoral joint      Assessment: 62-year-old female with right knee medial and patellofemoral osteoarthritis    Plan: Durga Glynn has had good success with HA injections in the past.  Her most recent one was several years ago. At this point we feel that her knee pain could be improved with another injection and we will provide the information needed for preauthorization. She can continue with the Voltaren gel for pain control in the interim. We will call her up once the preauthorization has been completed. We instructed her to call the office in 2 weeks if she does not hear back. Hugh Khan is in agreement with this plan. All questions were answered to patient's satisfaction and was encouraged to call with any further questions. Orders Placed This Encounter   Procedures    XR KNEE RIGHT (3 VIEWS)     Standing Status:   Future     Number of Occurrences:   1     Standing Expiration Date:   11/16/2021     Order Specific Question:   Reason for exam:     Answer:   Chandler Constantino, [address]     11/16/20  2:02 PM Attestation:  I was physically present and performed my own examination of this patient and have discussed the case, including pertinent history and exam findings with the fellow. I agree with the documented assessment and plan. Kenny Cummings.  Daryl Olszewski, MD

## 2020-11-17 RX ORDER — TRAMADOL HYDROCHLORIDE 50 MG/1
TABLET ORAL
Qty: 90 TABLET | Refills: 0 | OUTPATIENT
Start: 2020-11-17

## 2020-11-19 RX ORDER — TRAMADOL HYDROCHLORIDE 50 MG/1
50 TABLET ORAL EVERY 8 HOURS PRN
Qty: 90 TABLET | Refills: 0 | Status: SHIPPED | OUTPATIENT
Start: 2020-11-19 | End: 2020-12-15 | Stop reason: SDUPTHER

## 2020-11-24 ENCOUNTER — TELEPHONE (OUTPATIENT)
Dept: ORTHOPEDIC SURGERY | Age: 65
End: 2020-11-24

## 2020-11-24 NOTE — TELEPHONE ENCOUNTER
11/24/2020  [facility]     RIGHT  KNEE. [address] [facility] BENEFIT  # [id number]. DATES: 10/19/2020 - 12/23/2020. VERBAL GIVEN TO [facility] / PART OF [employer]. P: [phone]   [facility] WILL RUN TEST CLAIM THEN CALL PATIENT FOR CONSENT TO SHIP TO  ORTHO AND ANY CO-PAYMENT THEN FAX US TO SCHEDULE OFFICE DELIVERY. PER BECKY /RUBEN ([id number]). *NOTE:  NOT A COVERED MEDICAL BENEFIT FOR THIS [facility] PLAN.

## 2020-12-07 ENCOUNTER — OFFICE VISIT (OUTPATIENT)
Dept: ORTHOPEDIC SURGERY | Age: 65
End: 2020-12-07
Payer: COMMERCIAL

## 2020-12-07 VITALS — TEMPERATURE: 97 F | BODY MASS INDEX: 34.55 KG/M2 | HEIGHT: 61 IN | WEIGHT: 183 LBS

## 2020-12-07 PROCEDURE — 20610 DRAIN/INJ JOINT/BURSA W/O US: CPT | Performed by: ORTHOPAEDIC SURGERY

## 2020-12-07 PROCEDURE — 99213 OFFICE O/P EST LOW 20 MIN: CPT | Performed by: ORTHOPAEDIC SURGERY

## 2020-12-07 NOTE — PROGRESS NOTES
The patient returns today for her right knee osteoarthritis. She had viscosupplementation some time several years ago and wished to try this again as a steroid injection has not provided her with much relief. ROS: Pertinent items are noted in HPI. No notes on file    Past Medical History:  No date: Anxiety and depression      Comment:  on celexa WELL CONTROLLED AS PER PT 17  No date: Arthritis      Comment:  feet and mid to lower back  2007: Cancer ([facility] [address]      Comment:  uterine, right breast  No date: Diabetes mellitus ([facility] [address]  No date: GERD (gastroesophageal reflux disease)  No date: Hyperlipidemia  No date: Hypertension  No date: Overactive bladder  No date: Sleep apnea      Comment:  USES CPAP  No date: Type II or unspecified type diabetes mellitus without   mention of complication, not stated as uncontrolled  No date: Wears glasses     Past Surgical History:  No date: ABDOMEN SURGERY      Comment:  at age 10 mos--remove tissue  2005: BREAST SURGERY; Right      Comment:  cyst removed  2017: CARPAL TUNNEL RELEASE; Right  2017: CARPAL TUNNEL RELEASE; Left      Comment:  Left  Carpal Tunnel Release & Left Ulnar Nerve                decompression at the Cubital Tunnel   No date:  SECTION  No date: COLONOSCOPY  10/16/2020: COLONOSCOPY      Comment:  COLONOSCOPY POLYPECTOMY SNARE/COLD BIOPSY performed by                Fabiola Durant MD at [address] [place] [address]  2017: [address]; Right      Comment:  Index Finger  2007: HYSTERECTOMY      Comment:  d/t uterine cancer  2017: KNEE ARTHROSCOPY; Right      Comment:  RIGHT KNEE ARTHROSCOPY, PARTIAL MEDIAL MENISCECTOMY  2019: MASTECTOMY, MODIFIED RADICAL; Right      Comment:  RIGHT LOCALIZED PARTIAL BREAST MASTECTOMY,  RIGHT                AXILLARY LYMPH NODE DISSECTION, BIOZORB performed by                Brooke Richard MD at [facility]  No date: PORT SURGERY;  Right  No date: TONSILLECTOMY  2017: ULNAR TUNNEL Active member of club or organization: None        Attends meetings of clubs or organizations: None        Relationship status: None      Intimate partner violence        Fear of current or ex partner: None        Emotionally abused: None        Physically abused: None        Forced sexual activity: None    Other Topics      Concerns:        None    Social History Narrative      None      Current Outpatient Medications:  traMADol (ULTRAM) 50 MG tablet, Take 1 tablet by mouth every 8 hours as needed for Pain for up to 30 days. , Disp: 90 tablet, Rfl: 0  pravastatin (PRAVACHOL) 20 MG tablet, TAKE ONE TABLET BY MOUTH DAILY, Disp: 90 tablet, Rfl: 0  omeprazole (PRILOSEC) 40 MG delayed release capsule, TAKE ONE CAPSULE BY MOUTH DAILY, Disp: 90 capsule, Rfl: 3  losartan (COZAAR) 50 MG tablet, TAKE ONE TABLET BY MOUTH DAILY, Disp: 90 tablet, Rfl: 0  metFORMIN (GLUCOPHAGE-XR) 500 MG extended release tablet, TAKE ONE TABLET BY MOUTH THREE TIMES A DAY WITH A MEAL, Disp: 270 tablet, Rfl: 0  CPAP Machine MISC, by Does not apply route, Disp: , Rfl:   citalopram (CELEXA) 10 MG tablet, TAKE ONE TABLET BY MOUTH DAILY, Disp: 90 tablet, Rfl: 3  vitamin B-12 (CYANOCOBALAMIN) 100 MCG tablet, Take 1,000 mcg by mouth daily Energy B12, Disp: , Rfl:   diclofenac 1.5 % SOLN, APPLY 2 SQUIRTS TO THE AFFECTED AREA TWO TIMES A DAY, Disp: 150 mL, Rfl: 1  albuterol sulfate HFA (PROVENTIL HFA) 108 (90 Base) MCG/ACT inhaler, Inhale 2 puffs into the lungs every 6 hours as needed for Wheezing, Disp: 1 Inhaler, Rfl: 0  Spacer/Aero-Holding Chambers TORRES, 1 Device by Does not apply route daily as needed (To be used with spacer), Disp: 1 Device, Rfl: 0  oxybutynin (DITROPAN) 5 MG tablet, Take 1 tablet by mouth 2 times daily, Disp: 180 tablet, Rfl: 3  Multiple Vitamins-Minerals (MULTIVITAMIN ADULT PO), Take by mouth, Disp: , Rfl:   loratadine (CLARITIN) 10 MG tablet, Take 10 mg by mouth daily, Disp: , Rfl:    Calcium Carbonate-Vit D-Min (CALCIUM 1200 PO),

## 2020-12-09 NOTE — TELEPHONE ENCOUNTER
12/09/2020  PER CONY @ [facility], DRUG WAS DELIVERED TO THE FF OFFICE ON 12/2 @ 10:34AM AND SIGNED BY \"DANIEL\".   AP

## 2020-12-15 ENCOUNTER — OFFICE VISIT (OUTPATIENT)
Dept: FAMILY MEDICINE CLINIC | Age: 65
End: 2020-12-15
Payer: COMMERCIAL

## 2020-12-15 VITALS
HEIGHT: 60 IN | DIASTOLIC BLOOD PRESSURE: 68 MMHG | WEIGHT: 181 LBS | BODY MASS INDEX: 35.53 KG/M2 | HEART RATE: 75 BPM | TEMPERATURE: 97.1 F | SYSTOLIC BLOOD PRESSURE: 122 MMHG | OXYGEN SATURATION: 99 %

## 2020-12-15 PROCEDURE — 3051F HG A1C>EQUAL 7.0%<8.0%: CPT | Performed by: NURSE PRACTITIONER

## 2020-12-15 PROCEDURE — 99214 OFFICE O/P EST MOD 30 MIN: CPT | Performed by: NURSE PRACTITIONER

## 2020-12-15 RX ORDER — TRAMADOL HYDROCHLORIDE 50 MG/1
50 TABLET ORAL EVERY 8 HOURS PRN
Qty: 90 TABLET | Refills: 0 | Status: SHIPPED | OUTPATIENT
Start: 2020-12-15 | End: 2021-02-18

## 2020-12-15 RX ORDER — METFORMIN HYDROCHLORIDE 500 MG/1
TABLET, EXTENDED RELEASE ORAL
Qty: 270 TABLET | Refills: 0 | Status: SHIPPED | OUTPATIENT
Start: 2020-12-15 | End: 2021-03-16

## 2020-12-15 RX ORDER — DICLOFENAC SODIUM 16.05 MG/ML
1 SOLUTION TOPICAL 2 TIMES DAILY
Qty: 150 ML | Refills: 0 | Status: SHIPPED | OUTPATIENT
Start: 2020-12-15 | End: 2021-02-22

## 2020-12-15 ASSESSMENT — ENCOUNTER SYMPTOMS
CONSTIPATION: 0
DIARRHEA: 0
SHORTNESS OF BREATH: 0
NAUSEA: 0

## 2020-12-15 NOTE — PROGRESS NOTES
Kaveh Malin  : 1955  Encounter date: 12/15/2020    This jeromy 63 y.o. female who presents with  Chief Complaint   Patient presents with    Diabetes     F/u on meds        History of present illness:    HPI Pt is 63year old female for medication recheck on tramadol and diclofenac for bilateral knee OA and chronic LBP without sciatica, reports taking tramadol twice daily. Pt sees orthopedics, reports receiving cortisone injection R knee, discussed possible elevated BS following steroid injections. Pt with type 2 DM, non-insulin, last hgbA1c- 7.2, well controlled. Pt reports BS running 120-160. Pt denies current concerns. Reports being seen by Dr. Moisés Marie, Gatroenterology and diagnosed with Goff Syndrome, being seen by [facility], Dr. Carol Mcgowan for cancer labs, awaiting results. Pt to have colonoscopy repeated every 2 years. Current Outpatient Medications on File Prior to Visit   Medication Sig Dispense Refill    pravastatin (PRAVACHOL) 20 MG tablet TAKE ONE TABLET BY MOUTH DAILY 90 tablet 0    omeprazole (PRILOSEC) 40 MG delayed release capsule TAKE ONE CAPSULE BY MOUTH DAILY 90 capsule 3    losartan (COZAAR) 50 MG tablet TAKE ONE TABLET BY MOUTH DAILY 90 tablet 0    CPAP Machine MISC by Does not apply route      citalopram (CELEXA) 10 MG tablet TAKE ONE TABLET BY MOUTH DAILY 90 tablet 3    diclofenac 1.5 % SOLN APPLY 2 SQUIRTS TO THE AFFECTED AREA TWO TIMES A  mL 1    oxybutynin (DITROPAN) 5 MG tablet Take 1 tablet by mouth 2 times daily 180 tablet 3    Multiple Vitamins-Minerals (MULTIVITAMIN ADULT PO) Take by mouth      loratadine (CLARITIN) 10 MG tablet Take 10 mg by mouth daily      Calcium Carbonate-Vit D-Min (CALCIUM 1200 PO) Take by mouth      Cholecalciferol (VITAMIN D3) 2000 units CAPS Take by mouth      vitamin C (ASCORBIC ACID) 500 MG tablet Take 500 mg by mouth daily      Glucose Blood (KROGER TEST VI) 1 Device by In Vitro route. RIGHT LOCALIZED PARTIAL BREAST MASTECTOMY,  RIGHT AXILLARY LYMPH NODE DISSECTION, BIOZORB performed by Padilla Ruiz MD at [address] Right     TONSILLECTOMY      ULNAR TUNNEL RELEASE Right 2017    UPPER GASTROINTESTINAL ENDOSCOPY      UPPER GASTROINTESTINAL ENDOSCOPY N/A 10/16/2020    EGD BIOPSY performed by David Myers MD at [address]      Family History   Problem Relation Age of Onset    COPD Mother     Heart Disease Father     Cancer Other     Diabetes Other     Heart Disease Other     High Blood Pressure Other     Seizures Other     Diabetes Sister       Social History     Tobacco Use    Smoking status: Former Smoker     Packs/day: 0.25     Years: 10.00     Pack years: 2.50     Types: Cigarettes     Quit date: 1995     Years since quittin.6    Smokeless tobacco: Never Used   Substance Use Topics    Alcohol use: No     Alcohol/week: 0.0 standard drinks        Review of Systems   Constitutional: Positive for activity change. Negative for fatigue and unexpected weight change. Respiratory: Negative for shortness of breath. Cardiovascular: Negative for chest pain, palpitations and leg swelling. Gastrointestinal: Negative for constipation, diarrhea and nausea. Musculoskeletal: Positive for arthralgias (bilateral knee OA), gait problem and joint swelling. Hematological: Does not bruise/bleed easily. Psychiatric/Behavioral: Negative for sleep disturbance.        Objective:    /68 (Site: Left Upper Arm, Position: Sitting, Cuff Size: Large Adult)   Pulse 75   Temp 97.1 °F (36.2 °C) (Temporal)   Ht 5' (1.524 m)   Wt 181 lb (82.1 kg)   SpO2 99%   BMI 35.35 kg/m²   Weight: 181 lb (82.1 kg)     BP Readings from Last 3 Encounters:   12/15/20 122/68   10/16/20 (!) 141/86   10/16/20 129/66     Wt Readings from Last 3 Encounters:   12/15/20 181 lb (82.1 kg)   20 183 lb (83 kg)   20 183 lb (83 kg) BMI Readings from Last 3 Encounters:   12/15/20 35.35 kg/m²   12/07/20 35.15 kg/m²   11/16/20 35.15 kg/m²       Physical Exam  Vitals signs reviewed. Constitutional:       Appearance: Normal appearance. She is well-developed. She is obese. Cardiovascular:      Rate and Rhythm: Normal rate and regular rhythm. Heart sounds: Normal heart sounds. No murmur. Pulmonary:      Effort: Pulmonary effort is normal.      Breath sounds: Normal breath sounds. Abdominal:      General: Bowel sounds are normal.      Palpations: Abdomen is soft. Tenderness: There is no abdominal tenderness. Musculoskeletal:         General: Tenderness present. No signs of injury. Swelling: bilateral knee OA, limited ROM. Right lower leg: No edema. Left lower leg: No edema. Skin:     General: Skin is warm and dry. Findings: No bruising or lesion. Neurological:      Mental Status: She is alert and oriented to person, place, and time. Psychiatric:         Mood and Affect: Mood normal.         Assessment/Plan    1. Primary osteoarthritis of both knees  Advised ice, rest  OARRS reviewed  - traMADol (ULTRAM) 50 MG tablet; Take 1 tablet by mouth every 8 hours as needed for Pain for up to 30 days. Dispense: 90 tablet; Refill: 0  - Diclofenac Sodium 1.5 % SOLN; Apply 1 Squirt topically 2 times daily  Dispense: 150 mL; Refill: 0    2. Chronic midline low back pain without sciatica  - traMADol (ULTRAM) 50 MG tablet; Take 1 tablet by mouth every 8 hours as needed for Pain for up to 30 days. Dispense: 90 tablet; Refill: 0  - Diclofenac Sodium 1.5 % SOLN; Apply 1 Squirt topically 2 times daily  Dispense: 150 mL; Refill: 0    3. Type 2 diabetes mellitus without complication, without long-term current use of insulin ([facility])  Refill sent  - metFORMIN (GLUCOPHAGE-XR) 500 MG extended release tablet; TAKE ONE TABLET BY MOUTH THREE TIMES A DAY WITH A MEAL  Dispense: 270 tablet;  Refill: 0 Return in about 3 months (around 3/15/2021) for medication re-check, diabetes re-check, lab review. This dictation was generated by voice recognition computer software. Although all attempts are made to edit the dictation for accuracy, there may be errors in the transcription that are not intended.

## 2020-12-29 NOTE — PROGRESS NOTES
PCP:  Medical Oncology: long  Radiation: grass  Other:        [id number]  STAGE:  IIB right breast cancer      Ms. Logan Moreno is a 63y.o.-year-old woman who initially presented to me with  right breast cancer. Since her last encounter Ms. Logan Moreno has been doing well. She is completed neoadjuvant therapy as well as surgery. Her adjuvant treatment has included radiation. She has worked with physical therapy for breast lymphedema in the past.  She has had improved erythema which in the past was suspected to be radiation recall. It is much improved at this point. INTERVAL HISTORY:     On 11/16/2018 she initiated neoadjuvant chemotherapy with ddAC ->T    She completed neoadjuvant chemotherapy in April 2019. On 5/7/2019 she underwent a right partial mastectomy with axillary lymph node dissection. Pathology identified 1.6 cm of grade 3 invasive ductal carcinoma. ER negative ME negative HER-2 negative. Margins were negative. Disease approach the posterior margin but additional deep margin was taken with dissection was carried to the chest wall. There was 1/11 lymph nodes involved carcinoma.  [id number]. From 6/25/2019 to 8/6/2019 she underwent right breast radiation    On 12/4/2019 she underwent bilateral breast imaging. Postsurgical changes are noted in the right breast.  There is some skin thickening suggested to relate to posttreatment change. There are no new concerning findings suggestive of malignancy in either breast.  BI-RADS 3. On 7/2/2020 she underwent interval right breast imaging. Postsurgical changes are again noted in the upper outer right breast.  These are not substantially changed since December 2019. There are no new suspicious findings concerning for malignancy. BI-RADS 2. 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 Accession [id number]. On 1/7/2020 when she underwent bilateral breast imaging.   Benign findings are noted in the right breast which appear stable. There is no evidence concerning for malignancy identified in either breast.  BI-RADS 2. On 1/7/2021 she underwent bilateral breast imaging. Stable mammographic findings are noted following breast conservation in the right breast.  There are no new concerning findings suspicious of malignancy in either breast.  BI-RADS 2. Exam:  Physical exam has been reviewed and updated  General: no acute distress  Breast:  The patient was examined in the upright and supine position. There is a well healed scar on the right breast. There is a similarly well healed ipsilateral axillary scar. There are expected  post surgical and radiation related changes. The previous mild erythema has resolved however edematous changes are still present. There is firmness over her surgical bed which is unchanged. She has good range of motion with her arm. Her right arm however is slightly larger and more swollen than the left. This is most notable in her distal upper extremity. Her contralateral breast shows no new masses or changes in breast contour. There were no skin changes of the breast or nipple areolar complex. There was no nipple inversion or discharge. Respiratory: respirations are non-labored and there is no audible distress  Cardiovascular: regular rate, extremities appear well perfused  Neurologic: alert, oriented      Assessment/Plan:  [id number]  STAGE:  IIB right breast cancer  ER/KS/HER2 negative  S/p neoadjuvant chemotherapy  S/p PM with SLNB  S/p XRT    We reviewed her most recent breast imaging and physical exam findings. Signs/symptoms of recurrence were reviewed. She verbalizes understanding that she should notify our office if she identifies any abnormalities on self evaluation as it may require further workup. I encouraged her to continue self breast evaluation. Follow up surveillance was discussed. Her genetic testing results were reviewed.   At

## 2021-01-04 ENCOUNTER — OFFICE VISIT (OUTPATIENT)
Dept: ORTHOPEDIC SURGERY | Age: 66
End: 2021-01-04
Payer: COMMERCIAL

## 2021-01-04 VITALS — TEMPERATURE: 96.8 F | HEIGHT: 60 IN | WEIGHT: 181 LBS | BODY MASS INDEX: 35.53 KG/M2

## 2021-01-04 DIAGNOSIS — M17.10 ARTHRITIS OF KNEE: Primary | ICD-10-CM

## 2021-01-04 PROCEDURE — 99212 OFFICE O/P EST SF 10 MIN: CPT | Performed by: ORTHOPAEDIC SURGERY

## 2021-01-04 NOTE — PROGRESS NOTES
The patient returns today for follow-up of her right knee varus osteoarthritis. She received a Synvisc 1 injection about a month ago. She says her knee feels much better now. ROS: Pertinent items are noted in HPI. No notes on file    Past Medical History:  No date: Anxiety and depression      Comment:  on celexa WELL CONTROLLED AS PER PT 17  No date: Arthritis      Comment:  feet and mid to lower back  2007: Cancer ([facility] [address]      Comment:  uterine, right breast  No date: Diabetes mellitus ([facility] [address]  No date: GERD (gastroesophageal reflux disease)  No date: Hyperlipidemia  No date: Hypertension  No date: Overactive bladder  No date: Sleep apnea      Comment:  USES CPAP  No date: Type II or unspecified type diabetes mellitus without   mention of complication, not stated as uncontrolled  No date: Wears glasses     Past Surgical History:  No date: ABDOMEN SURGERY      Comment:  at age 10 mos--remove tissue  2005: BREAST SURGERY; Right      Comment:  cyst removed  2017: CARPAL TUNNEL RELEASE; Right  2017: CARPAL TUNNEL RELEASE; Left      Comment:  Left  Carpal Tunnel Release & Left Ulnar Nerve                decompression at the Cubital Tunnel   No date:  SECTION  No date: COLONOSCOPY  10/16/2020: COLONOSCOPY      Comment:  COLONOSCOPY POLYPECTOMY SNARE/COLD BIOPSY performed by                Yani Nye MD at [address]  2017: [address]; Right      Comment:  Index Finger  2007: HYSTERECTOMY      Comment:  d/t uterine cancer  2017: KNEE ARTHROSCOPY; Right      Comment:  RIGHT KNEE ARTHROSCOPY, PARTIAL MEDIAL MENISCECTOMY  2019: MASTECTOMY, MODIFIED RADICAL; Right      Comment:  RIGHT LOCALIZED PARTIAL BREAST MASTECTOMY,  RIGHT                AXILLARY LYMPH NODE DISSECTION, BIOZORB performed by                Toyin Martinez MD at [address]  No date: PORT SURGERY;  Right  No date: TONSILLECTOMY  2017: ULNAR TUNNEL RELEASE; Right No date: UPPER GASTROINTESTINAL ENDOSCOPY  10/16/2020: UPPER GASTROINTESTINAL ENDOSCOPY; N/A      Comment:  EGD BIOPSY performed by Johnna Rondon MD at [address] [place] [address] of patient's family history indicates:  Problem: COPD      Relation: Mother          Age of Onset: (Not Specified)  Problem: Heart Disease      Relation: Father          Age of Onset: (Not Specified)  Problem: Cancer      Relation: Other          Age of Onset: (Not Specified)  Problem: Diabetes      Relation: Other          Age of Onset: (Not Specified)  Problem: Heart Disease      Relation: Other          Age of Onset: (Not Specified)  Problem: High Blood Pressure      Relation: Other          Age of Onset: (Not Specified)  Problem: Seizures      Relation: Other          Age of Onset: (Not Specified)  Problem: Diabetes      Relation: Sister          Age of Onset: (Not Specified)      Social History    Socioeconomic History      Marital status: Single      Spouse name: None      Number of children: None      Years of education: None      Highest education level: None    Occupational History      None    Social Needs      Financial resource strain: None      Food insecurity        Worry: None        Inability: None      Transportation needs        Medical: None        Non-medical: None    Tobacco Use      Smoking status: Former Smoker        Packs/day: 0.25        Years: 10.00        Pack years: 2.5        Types: Cigarettes        Quit date: 1995        Years since quittin.7      Smokeless tobacco: Never Used    Substance and Sexual Activity      Alcohol use: No        Alcohol/week: 0.0 standard drinks      Drug use: No      Sexual activity: Never    Lifestyle      Physical activity        Days per week: None        Minutes per session: None      Stress: None    Relationships      Social connections        Talks on phone: None        Gets together: None        Attends Evangelical service: None Active member of club or organization: None        Attends meetings of clubs or organizations: None        Relationship status: None      Intimate partner violence        Fear of current or ex partner: None        Emotionally abused: None        Physically abused: None        Forced sexual activity: None    Other Topics      Concerns:        None    Social History Narrative      None      Current Outpatient Medications:    metFORMIN (GLUCOPHAGE-XR) 500 MG extended release tablet, TAKE ONE TABLET BY MOUTH THREE TIMES A DAY WITH A MEAL, Disp: 270 tablet, Rfl: 0    traMADol (ULTRAM) 50 MG tablet, Take 1 tablet by mouth every 8 hours as needed for Pain for up to 30 days. , Disp: 90 tablet, Rfl: 0    Diclofenac Sodium 1.5 % SOLN, Apply 1 Squirt topically 2 times daily, Disp: 150 mL, Rfl: 0    pravastatin (PRAVACHOL) 20 MG tablet, TAKE ONE TABLET BY MOUTH DAILY, Disp: 90 tablet, Rfl: 0    omeprazole (PRILOSEC) 40 MG delayed release capsule, TAKE ONE CAPSULE BY MOUTH DAILY, Disp: 90 capsule, Rfl: 3    losartan (COZAAR) 50 MG tablet, TAKE ONE TABLET BY MOUTH DAILY, Disp: 90 tablet, Rfl: 0    CPAP Machine MISC, by Does not apply route, Disp: , Rfl:     citalopram (CELEXA) 10 MG tablet, TAKE ONE TABLET BY MOUTH DAILY, Disp: 90 tablet, Rfl: 3    diclofenac 1.5 % SOLN, APPLY 2 SQUIRTS TO THE AFFECTED AREA TWO TIMES A DAY, Disp: 150 mL, Rfl: 1    oxybutynin (DITROPAN) 5 MG tablet, Take 1 tablet by mouth 2 times daily, Disp: 180 tablet, Rfl: 3    Multiple Vitamins-Minerals (MULTIVITAMIN ADULT PO), Take by mouth, Disp: , Rfl:     loratadine (CLARITIN) 10 MG tablet, Take 10 mg by mouth daily, Disp: , Rfl:     Calcium Carbonate-Vit D-Min (CALCIUM 1200 PO), Take by mouth, Disp: , Rfl:     Cholecalciferol (VITAMIN D3) 2000 units CAPS, Take by mouth, Disp: , Rfl:     vitamin C (ASCORBIC ACID) 500 MG tablet, Take 500 mg by mouth daily, Disp: , Rfl:   Glucose Blood (KROGER TEST VI), 1 Device by In Vitro route., Disp: , Rfl:     glucose blood VI test strips (KROGER TEST STRIPS) strip, 1 each by In Vitro route daily. As needed. , Disp: , Rfl:     No current facility-administered medications for this visit. -- Advil Pm [Ibuprofen-Diphenhydramine Cit] -- Shortness Of Breath    --  All NSAID   -- Aspirin -- Shortness Of Breath   -- Ibuprofen -- Shortness Of Breath   -- Zithromax [Azithromycin Dihydrate] -- Nausea And Vomiting and Other (See                             Comments)    --  Stomach cramping   -- Lisinopril -- Other (See Comments)    --  Angioedema, 4/2018   -- Xanax [Alprazolam] -- Other (See Comments)    --  Slurred speech             Patient felt \"out of control\"    VITAL SIGNS:  Temp 96.8 °F (36 °C)   Ht 5' (1.524 m)   Wt 181 lb (82.1 kg)   BMI 35.35 kg/m²   On examination today there is no warmth, erythema, or effusion. She has full extension and flexes to about 115 degrees. She has little bit of medial joint line tenderness but Brandy's testing does not produce a clunk or shift or increased pain. There is no retinacular tenderness and there is no lateral joint line tenderness. She makes good quadriceps contraction. Impression: Much improved right knee pain secondary to osteoarthritis after viscosupplementation. Plan: Return as needed.

## 2021-01-07 ENCOUNTER — OFFICE VISIT (OUTPATIENT)
Dept: SURGERY | Age: 66
End: 2021-01-07
Payer: COMMERCIAL

## 2021-01-07 ENCOUNTER — HOSPITAL ENCOUNTER (OUTPATIENT)
Dept: WOMENS IMAGING | Age: 66
Discharge: HOME | End: 2021-01-07
Payer: COMMERCIAL

## 2021-01-07 VITALS
BODY MASS INDEX: 35.93 KG/M2 | RESPIRATION RATE: 18 BRPM | DIASTOLIC BLOOD PRESSURE: 79 MMHG | HEART RATE: 76 BPM | HEIGHT: 60 IN | WEIGHT: 183 LBS | TEMPERATURE: 97.4 F | SYSTOLIC BLOOD PRESSURE: 138 MMHG | OXYGEN SATURATION: 99 %

## 2021-01-07 DIAGNOSIS — I89.0 LYMPHEDEMA OF RIGHT UPPER EXTREMITY: ICD-10-CM

## 2021-01-07 DIAGNOSIS — Z85.3 PERSONAL HISTORY OF BREAST CANCER: ICD-10-CM

## 2021-01-07 DIAGNOSIS — Z85.3 ENCOUNTER FOR FOLLOW-UP SURVEILLANCE OF BREAST CANCER: Primary | ICD-10-CM

## 2021-01-07 DIAGNOSIS — Z08 ENCOUNTER FOR FOLLOW-UP SURVEILLANCE OF BREAST CANCER: ICD-10-CM

## 2021-01-07 DIAGNOSIS — Z85.3 ENCOUNTER FOR FOLLOW-UP SURVEILLANCE OF BREAST CANCER: ICD-10-CM

## 2021-01-07 DIAGNOSIS — Z90.11 H/O PARTIAL MASTECTOMY, RIGHT: ICD-10-CM

## 2021-01-07 DIAGNOSIS — R92.2 DENSE BREAST TISSUE ON MAMMOGRAM: ICD-10-CM

## 2021-01-07 DIAGNOSIS — Z85.3 HX OF BREAST CANCER: ICD-10-CM

## 2021-01-07 DIAGNOSIS — Z08 ENCOUNTER FOR FOLLOW-UP SURVEILLANCE OF BREAST CANCER: Primary | ICD-10-CM

## 2021-01-07 PROCEDURE — 99213 OFFICE O/P EST LOW 20 MIN: CPT | Performed by: SURGERY

## 2021-01-07 PROCEDURE — G0279 TOMOSYNTHESIS, MAMMO: HCPCS

## 2021-01-11 RX ORDER — LOSARTAN POTASSIUM 50 MG/1
TABLET ORAL
Qty: 90 TABLET | Refills: 0 | Status: SHIPPED | OUTPATIENT
Start: 2021-01-11 | End: 2021-04-12

## 2021-01-11 NOTE — TELEPHONE ENCOUNTER
Medication:   Requested Prescriptions     Pending Prescriptions Disp Refills    losartan (COZAAR) 50 MG tablet [Pharmacy Med Name: LOSARTAN POTASSIUM 50 MG TAB] 90 tablet 0     Sig: TAKE ONE TABLET BY MOUTH DAILY        Patient Phone Number: [phone] (home) [phone] (work)    Last appt: 12/15/2020   Next appt: 3/15/2021    Last OARRS:   RX Monitoring 2/7/2020   Attestation -   Acute Pain Prescriptions -   Periodic Controlled Substance Monitoring No signs of potential drug abuse or diversion identified.    Chronic Pain > 80 MEDD -       Last PDMP Jax Barreto as Reviewed [facility]):  Review User Review Instant Review Result   Yajaira Dawn 12/15/2020  7:52 AM Reviewed PDMP [1]     Preferred Pharmacy:   [facility], [address]

## 2021-01-27 ENCOUNTER — HOSPITAL ENCOUNTER (OUTPATIENT)
Age: 66
Discharge: HOME | End: 2021-01-27
Payer: COMMERCIAL

## 2021-01-27 LAB
BILIRUBIN URINE: NEGATIVE
BLOOD, URINE: NEGATIVE
CLARITY: CLEAR
COLOR: YELLOW
EPITHELIAL CELLS, UA: 0 /HPF (ref 0–5)
GLUCOSE URINE: NEGATIVE MG/DL
HYALINE CASTS: 0 /LPF (ref 0–8)
KETONES, URINE: NEGATIVE MG/DL
LEUKOCYTE ESTERASE, URINE: ABNORMAL
MICROSCOPIC EXAMINATION: YES
NITRITE, URINE: NEGATIVE
PH UA: 6 (ref 5–8)
PROTEIN UA: NEGATIVE MG/DL
RBC UA: 1 /HPF (ref 0–4)
SPECIFIC GRAVITY UA: 1.01 (ref 1–1.03)
URINE TYPE: ABNORMAL
UROBILINOGEN, URINE: 0.2 E.U./DL
WBC UA: 3 /HPF (ref 0–5)

## 2021-01-27 PROCEDURE — 81001 URINALYSIS AUTO W/SCOPE: CPT

## 2021-01-27 PROCEDURE — 88112 CYTOPATH CELL ENHANCE TECH: CPT

## 2021-02-03 DIAGNOSIS — E78.00 PURE HYPERCHOLESTEROLEMIA: ICD-10-CM

## 2021-02-03 RX ORDER — PRAVASTATIN SODIUM 20 MG
TABLET ORAL
Qty: 90 TABLET | Refills: 0 | Status: SHIPPED | OUTPATIENT
Start: 2021-02-03 | End: 2021-05-10

## 2021-02-03 NOTE — TELEPHONE ENCOUNTER
Medication:   Requested Prescriptions     Pending Prescriptions Disp Refills    pravastatin (PRAVACHOL) 20 MG tablet [Pharmacy Med Name: PRAVASTATIN SODIUM 20 MG TAB] 90 tablet 0     Sig: TAKE ONE TABLET BY MOUTH DAILY        Patient Phone Number: [phone]     Last appt: 12/15/2020   Next appt: 3/15/2021    Last OARRS:   RX Monitoring 2/7/2020   Attestation -   Acute Pain Prescriptions -   Periodic Controlled Substance Monitoring No signs of potential drug abuse or diversion identified.    Chronic Pain > 80 MEDD -       Last PDMP Kierra Archuleta as Reviewed [facility]):  Review User Review Instant Review Result   Melanie Husbands 12/15/2020  7:52 AM Reviewed PDMP [1]     Preferred Pharmacy:   [facility], [facility] [address]

## 2021-02-05 ENCOUNTER — HOSPITAL ENCOUNTER (OUTPATIENT)
Age: 66
Discharge: HOME | End: 2021-02-05
Payer: COMMERCIAL

## 2021-02-18 DIAGNOSIS — G89.29 CHRONIC MIDLINE LOW BACK PAIN WITHOUT SCIATICA: ICD-10-CM

## 2021-02-18 DIAGNOSIS — M54.50 CHRONIC MIDLINE LOW BACK PAIN WITHOUT SCIATICA: ICD-10-CM

## 2021-02-18 DIAGNOSIS — M17.0 PRIMARY OSTEOARTHRITIS OF BOTH KNEES: ICD-10-CM

## 2021-02-18 RX ORDER — TRAMADOL HYDROCHLORIDE 50 MG/1
TABLET ORAL
Qty: 90 TABLET | Refills: 0 | Status: SHIPPED | OUTPATIENT
Start: 2021-02-18 | End: 2021-03-16 | Stop reason: SDUPTHER

## 2021-02-18 NOTE — TELEPHONE ENCOUNTER
Medication:   Pending Prescriptions Disp Refills    traMADol (ULTRAM) 50 MG tablet  90 tablet 0     Sig: ONE TABLET BY MOUTH EVERY 8 HOURS PRN FOR THE PAIN      Patient Phone Number: [phone] (home) [phone] (work)    Last appt: 12/15/2020   Next appt: 3/15/2021    Last OARRS:   RX Monitoring 2/7/2020   Attestation -   Acute Pain Prescriptions -   Periodic Controlled Substance Monitoring No signs of potential drug abuse or diversion identified.    Chronic Pain > 80 MEDD -       Last PDMP Heather Rivera as Reviewed [facility]):  Review User Review Instant Review Result   Matthew Burris 12/15/2020  7:52 AM Reviewed PDMP [1]     Preferred Pharmacy:   [facility], [address] [place] [address] Drive [address]

## 2021-02-20 DIAGNOSIS — M54.50 CHRONIC MIDLINE LOW BACK PAIN WITHOUT SCIATICA: ICD-10-CM

## 2021-02-20 DIAGNOSIS — M17.0 PRIMARY OSTEOARTHRITIS OF BOTH KNEES: ICD-10-CM

## 2021-02-20 DIAGNOSIS — G89.29 CHRONIC MIDLINE LOW BACK PAIN WITHOUT SCIATICA: ICD-10-CM

## 2021-02-22 RX ORDER — DICLOFENAC SODIUM 16.05 MG/ML
SOLUTION TOPICAL
Qty: 150 ML | Refills: 0 | Status: SHIPPED | OUTPATIENT
Start: 2021-02-22 | End: 2021-07-25 | Stop reason: SDUPTHER

## 2021-02-22 NOTE — TELEPHONE ENCOUNTER
Medication:   Pending Prescriptions Disp Refills    Diclofenac Sodium 1.5 % SOLN   0     Sig: APPLY ONE SQUIRT TOPICALLY TWICE A DAY        Patient Phone Number: [phone]       Last appt: 12/15/2020   Next appt: 3/15/2021    Last OARRS:   RX Monitoring 2/7/2020   Attestation -   Acute Pain Prescriptions -   Periodic Controlled Substance Monitoring No signs of potential drug abuse or diversion identified.    Chronic Pain > 80 MEDD -         Last PDMP [facility] as Reviewed [facility]):  Review User Review Instant Review Result   Judson Humphries 12/15/2020  7:52 AM Reviewed PDMP [1]     Preferred Pharmacy:   [facility], [address]

## 2021-03-03 ENCOUNTER — HOSPITAL ENCOUNTER (OUTPATIENT)
Age: 66
Discharge: HOME | End: 2021-03-03
Payer: COMMERCIAL

## 2021-03-03 DIAGNOSIS — E78.00 PURE HYPERCHOLESTEROLEMIA: ICD-10-CM

## 2021-03-03 DIAGNOSIS — E55.9 VITAMIN D INSUFFICIENCY: ICD-10-CM

## 2021-03-03 DIAGNOSIS — I10 ESSENTIAL HYPERTENSION: ICD-10-CM

## 2021-03-03 LAB
A/G RATIO: 1.3 (ref 1.1–2.2)
ALBUMIN SERPL-MCNC: 4.2 G/DL (ref 3.4–5)
ALP BLD-CCNC: 77 U/L (ref 40–129)
ALT SERPL-CCNC: 14 U/L (ref 10–40)
ANION GAP SERPL CALCULATED.3IONS-SCNC: 13 MMOL/L (ref 3–16)
AST SERPL-CCNC: 19 U/L (ref 15–37)
BASOPHILS ABSOLUTE: 0 K/UL (ref 0–0.2)
BASOPHILS RELATIVE PERCENT: 0.7 %
BILIRUB SERPL-MCNC: 0.4 MG/DL (ref 0–1)
BUN BLDV-MCNC: 19 MG/DL (ref 7–20)
CALCIUM SERPL-MCNC: 10.6 MG/DL (ref 8.3–10.6)
CHLORIDE BLD-SCNC: 105 MMOL/L (ref 99–110)
CHOLESTEROL, FASTING: 147 MG/DL (ref 0–199)
CO2: 27 MMOL/L (ref 21–32)
CREAT SERPL-MCNC: 0.9 MG/DL (ref 0.6–1.2)
EOSINOPHILS ABSOLUTE: 0.2 K/UL (ref 0–0.6)
EOSINOPHILS RELATIVE PERCENT: 2.5 %
GFR AFRICAN AMERICAN: >60
GFR NON-AFRICAN AMERICAN: >60
GLOBULIN: 3.2 G/DL
GLUCOSE FASTING: 122 MG/DL (ref 70–99)
HCT VFR BLD CALC: 33.3 % (ref 36–48)
HDLC SERPL-MCNC: 44 MG/DL (ref 40–60)
HEMOGLOBIN: 11.6 G/DL (ref 12–16)
LDL CHOLESTEROL CALCULATED: 78 MG/DL
LYMPHOCYTES ABSOLUTE: 1.8 K/UL (ref 1–5.1)
LYMPHOCYTES RELATIVE PERCENT: 26.7 %
MCH RBC QN AUTO: 31.8 PG (ref 26–34)
MCHC RBC AUTO-ENTMCNC: 34.7 G/DL (ref 31–36)
MCV RBC AUTO: 91.4 FL (ref 80–100)
MONOCYTES ABSOLUTE: 0.5 K/UL (ref 0–1.3)
MONOCYTES RELATIVE PERCENT: 7.8 %
NEUTROPHILS ABSOLUTE: 4.1 K/UL (ref 1.7–7.7)
NEUTROPHILS RELATIVE PERCENT: 62.3 %
PDW BLD-RTO: 12.9 % (ref 12.4–15.4)
PLATELET # BLD: 233 K/UL (ref 135–450)
PMV BLD AUTO: 8.7 FL (ref 5–10.5)
POTASSIUM SERPL-SCNC: 5.1 MMOL/L (ref 3.5–5.1)
RBC # BLD: 3.65 M/UL (ref 4–5.2)
SODIUM BLD-SCNC: 145 MMOL/L (ref 136–145)
TOTAL PROTEIN: 7.4 G/DL (ref 6.4–8.2)
TRIGLYCERIDE, FASTING: 125 MG/DL (ref 0–150)
VITAMIN D 25-HYDROXY: 90.3 NG/ML
VLDLC SERPL CALC-MCNC: 25 MG/DL
WBC # BLD: 6.6 K/UL (ref 4–11)

## 2021-03-03 PROCEDURE — 36415 COLL VENOUS BLD VENIPUNCTURE: CPT

## 2021-03-03 PROCEDURE — 80061 LIPID PANEL: CPT

## 2021-03-03 PROCEDURE — 83036 HEMOGLOBIN GLYCOSYLATED A1C: CPT

## 2021-03-03 PROCEDURE — 80053 COMPREHEN METABOLIC PANEL: CPT

## 2021-03-03 PROCEDURE — 82306 VITAMIN D 25 HYDROXY: CPT

## 2021-03-03 PROCEDURE — 85025 COMPLETE CBC W/AUTO DIFF WBC: CPT

## 2021-03-04 LAB
ESTIMATED AVERAGE GLUCOSE: 134.1 MG/DL
HBA1C MFR BLD: 6.3 %

## 2021-03-16 ENCOUNTER — OFFICE VISIT (OUTPATIENT)
Dept: FAMILY MEDICINE CLINIC | Age: 66
End: 2021-03-16
Payer: COMMERCIAL

## 2021-03-16 VITALS
HEIGHT: 60 IN | TEMPERATURE: 97.1 F | WEIGHT: 179 LBS | HEART RATE: 77 BPM | DIASTOLIC BLOOD PRESSURE: 62 MMHG | OXYGEN SATURATION: 97 % | BODY MASS INDEX: 35.14 KG/M2 | SYSTOLIC BLOOD PRESSURE: 118 MMHG

## 2021-03-16 DIAGNOSIS — M54.50 CHRONIC MIDLINE LOW BACK PAIN WITHOUT SCIATICA: ICD-10-CM

## 2021-03-16 DIAGNOSIS — G89.29 CHRONIC MIDLINE LOW BACK PAIN WITHOUT SCIATICA: ICD-10-CM

## 2021-03-16 DIAGNOSIS — M17.0 PRIMARY OSTEOARTHRITIS OF BOTH KNEES: ICD-10-CM

## 2021-03-16 DIAGNOSIS — E11.9 TYPE 2 DIABETES MELLITUS WITHOUT COMPLICATION, WITHOUT LONG-TERM CURRENT USE OF INSULIN (HCC): Primary | ICD-10-CM

## 2021-03-16 PROCEDURE — 99214 OFFICE O/P EST MOD 30 MIN: CPT | Performed by: NURSE PRACTITIONER

## 2021-03-16 RX ORDER — OMEPRAZOLE 40 MG/1
CAPSULE, DELAYED RELEASE ORAL
Qty: 90 CAPSULE | Refills: 3 | Status: SHIPPED | OUTPATIENT
Start: 2021-03-16 | End: 2021-06-30 | Stop reason: SDUPTHER

## 2021-03-16 RX ORDER — TRAMADOL HYDROCHLORIDE 50 MG/1
50 TABLET ORAL EVERY 8 HOURS PRN
Qty: 90 TABLET | Refills: 0 | Status: SHIPPED | OUTPATIENT
Start: 2021-03-16 | End: 2021-05-10

## 2021-03-16 ASSESSMENT — ENCOUNTER SYMPTOMS
NAUSEA: 0
CONSTIPATION: 0
CHEST TIGHTNESS: 0
VOMITING: 0
DIARRHEA: 0
BACK PAIN: 1
COLOR CHANGE: 0
SHORTNESS OF BREATH: 0

## 2021-03-16 NOTE — PROGRESS NOTES
Wei Nichols  : 1955  Encounter date: 3/16/2021    This jeromy 63 y.o. female who presents with  Chief Complaint   Patient presents with    Diabetes     3 month DM F/U     Medication Check     1 month med check        History of present illness:    HPI Pt is 63year old female for medication recheck on tramadol for bilateral knee OA. Recent labs showed hgbA1c- 6.3, down from 7.2, well controlled. Pt with slight anemia, hgb- 11.6, hct- 33.3. Pt sees Dr. 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 Sheri Morales. Pt advised to repeat colonoscopy every 2 years. Pt sees Dr. Ora Phoenix for arthritis in knees, has received viscosolution injections twice yearly, is currently not candidate for replacement. Concerns about lower left foot neuropathy and arthritis. Current Outpatient Medications on File Prior to Visit   Medication Sig Dispense Refill    Diclofenac Sodium 1.5 % SOLN APPLY ONE SQUIRT TOPICALLY TWICE A  mL 0    pravastatin (PRAVACHOL) 20 MG tablet TAKE ONE TABLET BY MOUTH DAILY 90 tablet 0    losartan (COZAAR) 50 MG tablet TAKE ONE TABLET BY MOUTH DAILY 90 tablet 0    CPAP Machine MISC by Does not apply route      citalopram (CELEXA) 10 MG tablet TAKE ONE TABLET BY MOUTH DAILY 90 tablet 3    diclofenac 1.5 % SOLN APPLY 2 SQUIRTS TO THE AFFECTED AREA TWO TIMES A  mL 1    oxybutynin (DITROPAN) 5 MG tablet Take 1 tablet by mouth 2 times daily 180 tablet 3    Multiple Vitamins-Minerals (MULTIVITAMIN ADULT PO) Take by mouth      loratadine (CLARITIN) 10 MG tablet Take 10 mg by mouth daily      Calcium Carbonate-Vit D-Min (CALCIUM 1200 PO) Take by mouth      Cholecalciferol (VITAMIN D3) 2000 units CAPS Take by mouth      vitamin C (ASCORBIC ACID) 500 MG tablet Take 500 mg by mouth daily      Glucose Blood ([employer] TEST VI) 1 Device by In Vitro route.       glucose blood VI test strips ([employer] TEST STRIPS) strip 1 each by In Vitro route daily. As needed. No current facility-administered medications on file prior to visit.        Allergies   Allergen Reactions    Advil Pm [Ibuprofen-Diphenhydramine Cit] Shortness Of Breath     All NSAID    Aspirin Shortness Of Breath    Ibuprofen Shortness Of Breath    Zithromax [Azithromycin Dihydrate] Nausea And Vomiting and Other (See Comments)     Stomach cramping    Lisinopril Other (See Comments)     Angioedema, 2018    Xanax [Alprazolam] Other (See Comments)     Slurred speech  Patient felt \"out of control\"     Past Medical History:   Diagnosis Date    Anxiety and depression     on celexa WELL CONTROLLED AS PER PT 17    Arthritis     feet and mid to lower back    Cancer [facility])     uterine, right breast    Diabetes mellitus ([facility] [address]     GERD (gastroesophageal reflux disease)     Hyperlipidemia     Hypertension     Overactive bladder     Sleep apnea     USES CPAP    Type II or unspecified type diabetes mellitus without mention of complication, not stated as uncontrolled     Wears glasses       Past Surgical History:   Procedure Laterality Date    ABDOMEN SURGERY      at age 10 mos--remove tissue    BREAST SURGERY Right     cyst removed    CARPAL TUNNEL RELEASE Right 2017    CARPAL TUNNEL RELEASE Left 2017    Left  Carpal Tunnel Release & Left Ulnar Nerve decompression at the Cubital Tunnel      SECTION      COLONOSCOPY      COLONOSCOPY  10/16/2020    COLONOSCOPY POLYPECTOMY SNARE/COLD BIOPSY performed by Jazzy Perez MD at [facility] Right 2017    Index Finger    HYSTERECTOMY      d/t uterine cancer    KNEE ARTHROSCOPY Right 2017    RIGHT KNEE ARTHROSCOPY, PARTIAL MEDIAL MENISCECTOMY    MASTECTOMY, MODIFIED RADICAL Right 2019    RIGHT LOCALIZED PARTIAL BREAST MASTECTOMY,  RIGHT AXILLARY LYMPH NODE DISSECTION, BIOZORB performed by Susana Elise MD at [address] [place] [address] Right     TONSILLECTOMY      ULNAR TUNNEL RELEASE Right 2017    UPPER GASTROINTESTINAL ENDOSCOPY      UPPER GASTROINTESTINAL ENDOSCOPY N/A 10/16/2020    EGD BIOPSY performed by Ale Banks MD at [address]      Family History   Problem Relation Age of Onset    COPD Mother     Heart Disease Father     Cancer Other     Diabetes Other     Heart Disease Other     High Blood Pressure Other     Seizures Other     Diabetes Sister       Social History     Tobacco Use    Smoking status: Former Smoker     Packs/day: 0.25     Years: 10.00     Pack years: 2.50     Types: Cigarettes     Quit date: 1995     Years since quittin.9    Smokeless tobacco: Never Used   Substance Use Topics    Alcohol use: No     Alcohol/week: 0.0 standard drinks        Review of Systems   Constitutional: Negative for activity change, appetite change, fatigue and unexpected weight change. Eyes: Negative for visual disturbance. Respiratory: Negative for chest tightness and shortness of breath. Cardiovascular: Negative for chest pain, palpitations and leg swelling. Gastrointestinal: Negative for constipation, diarrhea, nausea and vomiting. Endocrine: Negative for polydipsia, polyphagia and polyuria. Musculoskeletal: Positive for arthralgias (R knee), back pain and gait problem. Negative for joint swelling. Skin: Negative for color change and wound. Neurological: Positive for numbness (left 3 and 4th toes). Hematological: Does not bruise/bleed easily. Psychiatric/Behavioral: Negative for dysphoric mood and sleep disturbance. The patient is not nervous/anxious.         Objective:    /62 (Site: Left Upper Arm, Position: Sitting, Cuff Size: Medium Adult)   Pulse 77   Temp 97.1 °F (36.2 °C) (Temporal)   Ht 5' (1.524 m)   Wt 179 lb (81.2 kg)   SpO2 97%   BMI 34.96 kg/m²   Weight: 179 lb (81.2 kg)     BP Readings from Last 3 Encounters:   21 118/62   21 138/79   12/15/20 122/68 Wt Readings from Last 3 Encounters:   03/16/21 179 lb (81.2 kg)   01/07/21 183 lb (83 kg)   01/04/21 181 lb (82.1 kg)     BMI Readings from Last 3 Encounters:   03/16/21 34.96 kg/m²   01/07/21 35.74 kg/m²   01/04/21 35.35 kg/m²       Physical Exam  Vitals signs reviewed. Constitutional:       Appearance: Normal appearance. She is well-developed. She is obese. Eyes:      Extraocular Movements: Extraocular movements intact. Pupils: Pupils are equal, round, and reactive to light. Neck:      Musculoskeletal: Neck supple. No muscular tenderness. Cardiovascular:      Rate and Rhythm: Normal rate and regular rhythm. Heart sounds: Normal heart sounds. No murmur. Pulmonary:      Effort: Pulmonary effort is normal.      Breath sounds: Normal breath sounds. Abdominal:      General: Bowel sounds are normal.      Palpations: Abdomen is soft. Tenderness: There is no abdominal tenderness. Musculoskeletal:         General: Tenderness (R knee, 3rd and 4th toes) present. No signs of injury. Right lower leg: No edema. Left lower leg: No edema. Lymphadenopathy:      Cervical: No cervical adenopathy. Skin:     General: Skin is warm and dry. Findings: No bruising, erythema, lesion or rash. Neurological:      Mental Status: She is alert and oriented to person, place, and time. Psychiatric:         Mood and Affect: Mood normal.     Visual inspection:  Deformity/amputation: absent  Skin lesions/pre-ulcerative calluses: absent  Edema: right- negative, left- negative    Sensory exam:  Monofilament sensation: normal  (minimum of 5 random plantar locations tested, avoiding callused areas - > 1 area with absence of sensation is + for neuropathy)    Plus at least one of the following:  Pulses: normal,   Pinprick: Intact  Proprioception: Intact  Vibration (128 Hz): N/A      Assessment/Plan    1.  Primary osteoarthritis of both knees  OARRS reviewed  Discussed referral to [facility] preservation\" physician  - traMADol (ULTRAM) 50 MG tablet; Take 1 tablet by mouth every 8 hours as needed for Pain for up to 30 days. Dispense: 90 tablet; Refill: 0    2. Chronic midline low back pain without sciatica  - traMADol (ULTRAM) 50 MG tablet; Take 1 tablet by mouth every 8 hours as needed for Pain for up to 30 days. Dispense: 90 tablet; Refill: 0    3. Type 2 diabetes mellitus without complication, without long-term current use of insulin ([facility])  Decreased metformin to 500 mg BID  Low CHO diet, watch sugars  -  DIABETES FOOT EXAM  - CBC Auto Differential; Future  - Comprehensive Metabolic Panel, Fasting; Future  - Hemoglobin A1C; Future  - Lipid, Fasting; Future      Return in about 3 months (around 6/16/2021) for medication re-check. This dictation was generated by voice recognition computer software. Although all attempts are made to edit the dictation for accuracy, there may be errors in the transcription that are not intended.

## 2021-03-18 ENCOUNTER — TELEPHONE (OUTPATIENT)
Dept: ADMINISTRATIVE | Age: 66
End: 2021-03-18

## 2021-03-18 NOTE — TELEPHONE ENCOUNTER
Submitted PA for Omeprazole 40MG dr capsules. Key: [id number] - PA Case ID: [id number] - Rx #: [id number]. Via CM STATUS: APPROVED 02/16/2021; Coverage End Date:03/18/2022. Will scan letter when received. Please notify patient, thank you.

## 2021-04-12 DIAGNOSIS — E11.9 TYPE 2 DIABETES MELLITUS WITHOUT COMPLICATION, WITHOUT LONG-TERM CURRENT USE OF INSULIN (HCC): ICD-10-CM

## 2021-04-12 RX ORDER — METFORMIN HYDROCHLORIDE 500 MG/1
TABLET, EXTENDED RELEASE ORAL
Qty: 270 TABLET | Refills: 0 | Status: SHIPPED | OUTPATIENT
Start: 2021-04-12 | End: 2021-06-07

## 2021-04-12 RX ORDER — LOSARTAN POTASSIUM 50 MG/1
TABLET ORAL
Qty: 90 TABLET | Refills: 0 | Status: SHIPPED | OUTPATIENT
Start: 2021-04-12 | End: 2021-07-13

## 2021-04-12 NOTE — TELEPHONE ENCOUNTER
Medication:   Pending Prescriptions Disp Refills    metFORMIN (GLUCOPHAGE-XR) 500  tablet 0     Sig: TAKE ONE TABLET BY MOUTH THREE TIMES A DAY WITH MEALS    losartan (COZAAR) 50 MG tablet 90 tablet 0     Sig: TAKE ONE TABLET BY MOUTH DAILY        Patient Phone Number: [phone] (home) [phone] (work)    Last appt: 3/16/2021   Next appt: 6/16/2021    Last OARRS:   RX Monitoring 2/7/2020   Attestation -   Acute Pain Prescriptions -   Periodic Controlled Substance Monitoring No signs of potential drug abuse or diversion identified.    Chronic Pain > 80 MEDD -         Preferred Pharmacy:   [facility], [address]

## 2021-05-10 DIAGNOSIS — M17.0 PRIMARY OSTEOARTHRITIS OF BOTH KNEES: ICD-10-CM

## 2021-05-10 DIAGNOSIS — E78.00 PURE HYPERCHOLESTEROLEMIA: ICD-10-CM

## 2021-05-10 DIAGNOSIS — G89.29 CHRONIC MIDLINE LOW BACK PAIN WITHOUT SCIATICA: ICD-10-CM

## 2021-05-10 DIAGNOSIS — M54.50 CHRONIC MIDLINE LOW BACK PAIN WITHOUT SCIATICA: ICD-10-CM

## 2021-05-10 RX ORDER — PRAVASTATIN SODIUM 20 MG
TABLET ORAL
Qty: 90 TABLET | Refills: 0 | Status: SHIPPED | OUTPATIENT
Start: 2021-05-10 | End: 2021-08-17

## 2021-05-10 RX ORDER — TRAMADOL HYDROCHLORIDE 50 MG/1
TABLET ORAL
Qty: 90 TABLET | Refills: 0 | Status: SHIPPED | OUTPATIENT
Start: 2021-05-10 | End: 2021-06-30 | Stop reason: SDUPTHER

## 2021-06-07 ENCOUNTER — OFFICE VISIT (OUTPATIENT)
Dept: FAMILY MEDICINE CLINIC | Age: 66
End: 2021-06-07
Payer: COMMERCIAL

## 2021-06-07 VITALS
HEART RATE: 75 BPM | SYSTOLIC BLOOD PRESSURE: 123 MMHG | WEIGHT: 189 LBS | DIASTOLIC BLOOD PRESSURE: 76 MMHG | TEMPERATURE: 97.2 F | BODY MASS INDEX: 36.91 KG/M2

## 2021-06-07 DIAGNOSIS — N30.01 ACUTE CYSTITIS WITH HEMATURIA: Primary | ICD-10-CM

## 2021-06-07 LAB
BACTERIA URINE, POC: ABNORMAL
BILIRUBIN URINE: 0 MG/DL
BLOOD, URINE: POSITIVE
CASTS URINE, POC: ABNORMAL
CLARITY: ABNORMAL
COLOR: YELLOW
CRYSTALS URINE, POC: ABNORMAL
EPI CELLS URINE, POC: ABNORMAL
GLUCOSE URINE: NEGATIVE
KETONES, URINE: NEGATIVE
LEUKOCYTE EST, POC: POSITIVE
NITRITE, URINE: NEGATIVE
PH UA: 5.5 (ref 4.5–8)
PROTEIN UA: POSITIVE
RBC URINE, POC: ABNORMAL
SPECIFIC GRAVITY UA: 1.02 (ref 1–1.03)
UROBILINOGEN, URINE: NORMAL
WBC URINE, POC: ABNORMAL
YEAST URINE, POC: ABNORMAL

## 2021-06-07 PROCEDURE — 99213 OFFICE O/P EST LOW 20 MIN: CPT | Performed by: FAMILY MEDICINE

## 2021-06-07 PROCEDURE — 81000 URINALYSIS NONAUTO W/SCOPE: CPT | Performed by: FAMILY MEDICINE

## 2021-06-07 RX ORDER — CIPROFLOXACIN 500 MG/1
500 TABLET, FILM COATED ORAL 2 TIMES DAILY
Qty: 10 TABLET | Refills: 0 | Status: SHIPPED | OUTPATIENT
Start: 2021-06-07 | End: 2021-06-12

## 2021-06-07 RX ORDER — NYSTATIN 100000 [USP'U]/G
POWDER TOPICAL 4 TIMES DAILY
COMMUNITY
End: 2021-12-15

## 2021-06-07 NOTE — PROGRESS NOTES
Here with concerns for UTI. Symptoms started Thursday 6/3  Frequency: Yes  Urgency: Yes  Burning: Yes  Blood in urine: Yes - when wipes off and on   Abd pain: No  Back pain: Yes  Nausea: No  Vomiting: No  Fever: No      Vitals:    06/07/21 1720   BP: 123/76   Pulse: 75   Temp: 97.2 °F (36.2 °C)   Weight: 189 lb (85.7 kg)     Wt Readings from Last 3 Encounters:   06/07/21 189 lb (85.7 kg)   03/16/21 179 lb (81.2 kg)   01/07/21 183 lb (83 kg)     Body mass index is 36.91 kg/m². PHQ Scores 11/8/2019 3/14/2018   PHQ2 Score 4 0   PHQ9 Score 4 0           GEN: Alert and oriented x 4 NAD, affect appropriate and obese, well hydrated, well developed. ASSESSMENT AND PLAN:       Curtis Cook was seen today for dysuria. Diagnoses and all orders for this visit:    Acute cystitis with hematuria  -     POC URINE with Microscopic  -     Culture, Urine    Other orders  -     ciprofloxacin (CIPRO) 500 MG tablet;  Take 1 tablet by mouth 2 times daily for 5 days    urine shows leuks but o/w ok  Will tx empirically  Sent for cx  If cx neg needs workup for hematuria

## 2021-06-07 NOTE — PATIENT INSTRUCTIONS
Patient Education        Urinary Tract Infection (UTI) in Women: Care Instructions  Overview     A urinary tract infection, or UTI, is a general term for an infection anywhere between the kidneys and the urethra (where urine comes out). Most UTIs are bladder infections. They often cause pain or burning when you urinate. UTIs are caused by bacteria and can be cured with antibiotics. Be sure to complete your treatment so that the infection does not get worse. Follow-up care is a key part of your treatment and safety. Be sure to make and go to all appointments, and call your doctor if you are having problems. It's also a good idea to know your test results and keep a list of the medicines you take. How can you care for yourself at home? · Take your antibiotics as directed. Do not stop taking them just because you feel better. You need to take the full course of antibiotics. · Drink extra water and other fluids for the next day or two. This will help make the urine less concentrated and help wash out the bacteria that are causing the infection. (If you have kidney, heart, or liver disease and have to limit fluids, talk with your doctor before you increase the amount of fluids you drink.)  · Avoid drinks that are carbonated or have caffeine. They can irritate the bladder. · Urinate often. Try to empty your bladder each time. · To relieve pain, take a hot bath or lay a heating pad set on low over your lower belly or genital area. Never go to sleep with a heating pad in place. To prevent UTIs  · Drink plenty of water each day. This helps you urinate often, which clears bacteria from your system. (If you have kidney, heart, or liver disease and have to limit fluids, talk with your doctor before you increase the amount of fluids you drink.)  · Urinate when you need to. · If you are sexually active, urinate right after you have sex. · Change sanitary pads often.   · Avoid douches, bubble baths, feminine hygiene sprays, and other feminine hygiene products that have deodorants. · After going to the bathroom, wipe from front to back. When should you call for help? Call your doctor now or seek immediate medical care if:    · Symptoms such as fever, chills, nausea, or vomiting get worse or appear for the first time.     · You have new pain in your back just below your rib cage. This is called flank pain.     · There is new blood or pus in your urine.     · You have any problems with your antibiotic medicine. Watch closely for changes in your health, and be sure to contact your doctor if:    · You are not getting better after taking an antibiotic for 2 days.     · Your symptoms go away but then come back. Where can you learn more? Go to [email or web address] org and sign in to your [employer] account. Enter N476 in the [employer] box to learn more about \"Urinary Tract Infection (UTI) in Women: Care Instructions. \"     If you do not have an account, please click on the \"Sign Up Now\" link. Current as of: June 29, 2020               Content Version: 12.8  © 0651-0049 Healthwise, Incorporated. Care instructions adapted under license by [facility] ([facility]). If you have questions about a medical condition or this instruction, always ask your healthcare professional. [address] any warranty or liability for your use of this information.

## 2021-06-09 LAB
ORGANISM: ABNORMAL
URINE CULTURE, ROUTINE: ABNORMAL

## 2021-06-30 ENCOUNTER — OFFICE VISIT (OUTPATIENT)
Dept: FAMILY MEDICINE CLINIC | Age: 66
End: 2021-06-30
Payer: COMMERCIAL

## 2021-06-30 VITALS
HEIGHT: 60 IN | SYSTOLIC BLOOD PRESSURE: 122 MMHG | TEMPERATURE: 97.6 F | WEIGHT: 182.6 LBS | DIASTOLIC BLOOD PRESSURE: 82 MMHG | BODY MASS INDEX: 35.85 KG/M2 | OXYGEN SATURATION: 98 % | HEART RATE: 76 BPM

## 2021-06-30 DIAGNOSIS — G89.29 CHRONIC MIDLINE LOW BACK PAIN WITHOUT SCIATICA: ICD-10-CM

## 2021-06-30 DIAGNOSIS — M17.0 PRIMARY OSTEOARTHRITIS OF BOTH KNEES: Primary | ICD-10-CM

## 2021-06-30 DIAGNOSIS — K21.9 GASTROESOPHAGEAL REFLUX DISEASE WITHOUT ESOPHAGITIS: Primary | ICD-10-CM

## 2021-06-30 DIAGNOSIS — M54.50 CHRONIC MIDLINE LOW BACK PAIN WITHOUT SCIATICA: ICD-10-CM

## 2021-06-30 PROCEDURE — 99213 OFFICE O/P EST LOW 20 MIN: CPT | Performed by: NURSE PRACTITIONER

## 2021-06-30 RX ORDER — TRAMADOL HYDROCHLORIDE 50 MG/1
50 TABLET ORAL EVERY 8 HOURS PRN
Qty: 90 TABLET | Refills: 0 | Status: SHIPPED | OUTPATIENT
Start: 2021-06-30 | End: 2021-08-16

## 2021-06-30 RX ORDER — OMEPRAZOLE 40 MG/1
CAPSULE, DELAYED RELEASE ORAL
Qty: 90 CAPSULE | Refills: 3 | Status: SHIPPED | OUTPATIENT
Start: 2021-06-30

## 2021-06-30 SDOH — ECONOMIC STABILITY: FOOD INSECURITY: WITHIN THE PAST 12 MONTHS, YOU WORRIED THAT YOUR FOOD WOULD RUN OUT BEFORE YOU GOT MONEY TO BUY MORE.: NEVER TRUE

## 2021-06-30 SDOH — ECONOMIC STABILITY: FOOD INSECURITY: WITHIN THE PAST 12 MONTHS, THE FOOD YOU BOUGHT JUST DIDN'T LAST AND YOU DIDN'T HAVE MONEY TO GET MORE.: NEVER TRUE

## 2021-06-30 ASSESSMENT — SOCIAL DETERMINANTS OF HEALTH (SDOH): HOW HARD IS IT FOR YOU TO PAY FOR THE VERY BASICS LIKE FOOD, HOUSING, MEDICAL CARE, AND HEATING?: NOT HARD AT ALL

## 2021-06-30 NOTE — PROGRESS NOTES
Tania Pierce  : 1955  Encounter date: 2021    This jeromy 63 y.o. female who presents with  Chief Complaint   Patient presents with    Diabetes     DM med check 3 months        History of present illness:    HPI Pt is 63year old female for medication management for tramadol for OA bilateral knees. Upcoming labs due in 3 months, recent decrease in Metformin. Pt reports will be retiring in January and relocating to [facility]. No new concerns. Current Outpatient Medications on File Prior to Visit   Medication Sig Dispense Refill    nystatin (NYAMYC) 913666 UNIT/GM powder Apply topically 4 times daily Apply topically 4 times daily.  pravastatin (PRAVACHOL) 20 MG tablet TAKE ONE TABLET BY MOUTH DAILY 90 tablet 0    losartan (COZAAR) 50 MG tablet TAKE ONE TABLET BY MOUTH DAILY 90 tablet 0    omeprazole (PRILOSEC) 40 MG delayed release capsule TAKE ONE CAPSULE BY MOUTH DAILY 90 capsule 3    metFORMIN (GLUCOPHAGE) 500 MG tablet Take 1 tablet by mouth 2 times daily (with meals) 180 tablet 1    Diclofenac Sodium 1.5 % SOLN APPLY ONE SQUIRT TOPICALLY TWICE A  mL 0    CPAP Machine MISC by Does not apply route      citalopram (CELEXA) 10 MG tablet TAKE ONE TABLET BY MOUTH DAILY 90 tablet 3    oxybutynin (DITROPAN) 5 MG tablet Take 1 tablet by mouth 2 times daily 180 tablet 3    Multiple Vitamins-Minerals (MULTIVITAMIN ADULT PO) Take by mouth      loratadine (CLARITIN) 10 MG tablet Take 10 mg by mouth daily      Calcium Carbonate-Vit D-Min (CALCIUM 1200 PO) Take by mouth      Cholecalciferol (VITAMIN D3) 2000 units CAPS Take by mouth      vitamin C (ASCORBIC ACID) 500 MG tablet Take 500 mg by mouth daily      glucose blood VI test strips ([employer] TEST STRIPS) strip 1 each by In Vitro route daily. As needed. No current facility-administered medications on file prior to visit.       Allergies   Allergen Reactions    Advil Pm [Ibuprofen-Diphenhydramine Cit] Shortness Of Breath ENDOSCOPY      Family History   Problem Relation Age of Onset    COPD Mother     Heart Disease Father     Cancer Other     Diabetes Other     Heart Disease Other     High Blood Pressure Other     Seizures Other     Diabetes Sister       Social History     Tobacco Use    Smoking status: Former Smoker     Packs/day: 0.25     Years: 10.00     Pack years: 2.50     Types: Cigarettes     Quit date: 1995     Years since quittin.2    Smokeless tobacco: Never Used   Substance Use Topics    Alcohol use: No     Alcohol/week: 0.0 standard drinks        Review of Systems   Constitutional: Negative for activity change and chills. Musculoskeletal: Positive for arthralgias and gait problem. Psychiatric/Behavioral: Negative for dysphoric mood and sleep disturbance. The patient is not nervous/anxious. Objective:    /82 (Site: Left Upper Arm, Position: Sitting, Cuff Size: Medium Adult)   Pulse 76   Temp 97.6 °F (36.4 °C) (Temporal)   Ht 5' (1.524 m)   Wt 182 lb 9.6 oz (82.8 kg)   SpO2 98%   BMI 35.66 kg/m²   Weight: 182 lb 9.6 oz (82.8 kg)     BP Readings from Last 3 Encounters:   21 122/82   21 123/76   21 118/62     Wt Readings from Last 3 Encounters:   21 182 lb 9.6 oz (82.8 kg)   21 189 lb (85.7 kg)   21 179 lb (81.2 kg)     BMI Readings from Last 3 Encounters:   21 35.66 kg/m²   21 36.91 kg/m²   21 34.96 kg/m²       Physical Exam  Vitals reviewed. Constitutional:       Appearance: Normal appearance. She is well-developed. She is obese. Cardiovascular:      Rate and Rhythm: Normal rate and regular rhythm. Heart sounds: Normal heart sounds. No murmur heard. Pulmonary:      Effort: Pulmonary effort is normal.      Breath sounds: Normal breath sounds. Musculoskeletal:         General: Tenderness (bilateral knee pain OA) present. Skin:     General: Skin is warm and dry.    Neurological:      Mental Status: She is alert and oriented to person, place, and time. Gait: Gait abnormal.   Psychiatric:         Mood and Affect: Mood normal.         Assessment/Plan    1. Primary osteoarthritis of both knees  OARRS reviewed  - traMADol (ULTRAM) 50 MG tablet; Take 1 tablet by mouth every 8 hours as needed for Pain for up to 30 days. Dispense: 90 tablet; Refill: 0    2. Chronic midline low back pain without sciatica  - traMADol (ULTRAM) 50 MG tablet; Take 1 tablet by mouth every 8 hours as needed for Pain for up to 30 days. Dispense: 90 tablet; Refill: 0      Return in about 3 months (around 9/30/2021) for diabetes re-check, medication re-check, lab review. This dictation was generated by voice recognition computer software. Although all attempts are made to edit the dictation for accuracy, there may be errors in the transcription that are not intended.

## 2021-07-12 NOTE — TELEPHONE ENCOUNTER
Medication:   Pending Prescriptions Disp Refills    losartan (COZAAR) 50 MG tablet 90 tablet 0     Sig: TAKE ONE TABLET BY MOUTH DAILY      Patient Phone Number: [phone] (home) [phone] (work)    Last appt: 6/30/2021   Next appt: 9/30/2021    Last OARRS:   RX Monitoring 2/7/2020   Attestation -   Acute Pain Prescriptions -   Periodic Controlled Substance Monitoring No signs of potential drug abuse or diversion identified.    Chronic Pain > 80 MEDD -     Last PDMP [facility] as Reviewed [facility]):  Review User Review Instant Review Result   Ivon Car 6/30/2021  3:30 PM Reviewed PDMP [1]     Preferred Pharmacy:   [address] [facility], [address]

## 2021-07-12 NOTE — PROGRESS NOTES
PCP:  Medical Oncology: long  Radiation: grass  Other:        [id number]  STAGE:  IIB right breast cancer      Ms. Mohini Beard is a 63y.o.-year-old woman who initially presented to me with  right breast cancer. Since her last encounter Ms. Mohini Beard has been doing well. She is completed neoadjuvant therapy as well as surgery. Her adjuvant treatment has included radiation. She has worked with physical therapy for breast lymphedema in the past.  She has had erythema which in the past was suspected to be radiation recall. There is no sign of this today. She has occasional tenderness along her right chest wall. INTERVAL HISTORY:     On 11/16/2018 she initiated neoadjuvant chemotherapy with ddAC ->T    She completed neoadjuvant chemotherapy in April 2019. On 5/7/2019 she underwent a right partial mastectomy with axillary lymph node dissection. Pathology identified 1.6 cm of grade 3 invasive ductal carcinoma. ER negative MA negative HER-2 negative. Margins were negative. Disease approach the posterior margin but additional deep margin was taken with dissection was carried to the chest wall. There was 1/11 lymph nodes involved carcinoma.  [id number]. From 6/25/2019 to 8/6/2019 she underwent right breast radiation    On 12/4/2019 she underwent bilateral breast imaging. Postsurgical changes are noted in the right breast.  There is some skin thickening suggested to relate to posttreatment change. There are no new concerning findings suggestive of malignancy in either breast.  BI-RADS 3. On 7/2/2020 she underwent interval right breast imaging. Postsurgical changes are again noted in the upper outer right breast.  These are not substantially changed since December 2019. There are no new suspicious findings concerning for malignancy. BI-RADS 2. 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 Accession [id number].     On 1/7/2020 when she underwent bilateral breast imaging. Benign findings are noted in the right breast which appear stable. There is no evidence concerning for malignancy identified in either breast.  BI-RADS 2. On 1/7/2021 she underwent bilateral breast imaging. Stable mammographic findings are noted following breast conservation in the right breast.  There are no new concerning findings suspicious of malignancy in either breast.  BI-RADS 2. On 7/15/2021 she underwent right diagnostic breast imaging. Postsurgical changes are noted in the right breast. The internal scar appears stable. Stable postsurgical changes are noted in the location of the right excisional biopsy as well. Skin thickening of the breast is stable. There are no new concerning findings suggestive of malignancy. BI-RADS 2. Exam:  Physical exam has been reviewed and updated  General: no acute distress  Breast:  The patient was examined in the upright and supine position. There is a well healed scar on the right breast. There is a similarly well healed ipsilateral axillary scar. There are expected  post surgical and radiation related changes. The previous mild erythema has resolved however edematous changes are still present. There is firmness over her surgical bed which is unchanged. She has good range of motion with her arm. Her right arm however is slightly larger and more swollen than the left. This is most notable in her distal upper extremity. Her contralateral breast shows no new masses or changes in breast contour. There were no skin changes of the breast or nipple areolar complex. There was no nipple inversion or discharge.    Respiratory: respirations are non-labored and there is no audible distress  Cardiovascular: regular rate, extremities appear well perfused  Neurologic: alert, oriented      Assessment/Plan:  [id number]  STAGE:  IIB right breast cancer  ER/MT/HER2 negative  S/p neoadjuvant chemotherapy  S/p PM with SLNB  S/p XRT    We reviewed her most recent breast imaging and physical exam findings. Signs/symptoms of recurrence were reviewed. She verbalizes understanding that she should notify our office if she identifies any abnormalities on self evaluation as it may require further workup. I encouraged her to continue self breast evaluation. Follow up surveillance was discussed. Her genetic testing results were reviewed. At this time there is no plan to support increased screening above average risk. Lymphedema management as needed. Our plan at this time is to follow up with surgical breast oncology office in 6 months for clinical exam and right breast imaging. Bilateral breast imaging will be due in December 2021/January 2022. If within normal limits at that time we will move to an annual basis. She also believes she will likely relocate to where family is after her residential later this year. We discussed importance of routine follow-up and would happy to forward her records. All of the patient's questions were answered at this time however, she was encouraged to call the office with any further inquiries. Approximately 20 minutes of time were spent in preparation, direct patient contact, care coordination, documentation and activities otherwise related to this encounter.

## 2021-07-13 RX ORDER — LOSARTAN POTASSIUM 50 MG/1
TABLET ORAL
Qty: 90 TABLET | Refills: 0 | Status: SHIPPED | OUTPATIENT
Start: 2021-07-13 | End: 2021-10-13

## 2021-07-15 ENCOUNTER — HOSPITAL ENCOUNTER (OUTPATIENT)
Dept: WOMENS IMAGING | Age: 66
Discharge: HOME | End: 2021-07-15
Payer: COMMERCIAL

## 2021-07-15 ENCOUNTER — OFFICE VISIT (OUTPATIENT)
Dept: SURGERY | Age: 66
End: 2021-07-15
Payer: COMMERCIAL

## 2021-07-15 VITALS
SYSTOLIC BLOOD PRESSURE: 118 MMHG | BODY MASS INDEX: 35.53 KG/M2 | OXYGEN SATURATION: 97 % | DIASTOLIC BLOOD PRESSURE: 63 MMHG | HEIGHT: 60 IN | HEART RATE: 78 BPM | WEIGHT: 181 LBS

## 2021-07-15 VITALS — WEIGHT: 176 LBS | HEIGHT: 60 IN | BODY MASS INDEX: 34.55 KG/M2

## 2021-07-15 DIAGNOSIS — Z08 ENCOUNTER FOR FOLLOW-UP SURVEILLANCE OF BREAST CANCER: Primary | ICD-10-CM

## 2021-07-15 DIAGNOSIS — Z85.3 PERSONAL HISTORY OF BREAST CANCER: ICD-10-CM

## 2021-07-15 DIAGNOSIS — Z12.39 SCREENING BREAST EXAMINATION: ICD-10-CM

## 2021-07-15 DIAGNOSIS — Z85.3 PERSONAL HISTORY OF MALIGNANT NEOPLASM OF BREAST: ICD-10-CM

## 2021-07-15 DIAGNOSIS — Z85.3 ENCOUNTER FOR FOLLOW-UP SURVEILLANCE OF BREAST CANCER: Primary | ICD-10-CM

## 2021-07-15 DIAGNOSIS — Z12.31 SCREENING MAMMOGRAM, ENCOUNTER FOR: ICD-10-CM

## 2021-07-15 PROCEDURE — G0279 TOMOSYNTHESIS, MAMMO: HCPCS

## 2021-07-15 PROCEDURE — 99213 OFFICE O/P EST LOW 20 MIN: CPT | Performed by: SURGERY

## 2021-07-16 DIAGNOSIS — Z08 ENCOUNTER FOR FOLLOW-UP SURVEILLANCE OF BREAST CANCER: Primary | ICD-10-CM

## 2021-07-16 DIAGNOSIS — Z85.3 PERSONAL HISTORY OF BREAST CANCER: ICD-10-CM

## 2021-07-16 DIAGNOSIS — I89.0 LYMPHEDEMA OF BREAST: ICD-10-CM

## 2021-07-16 DIAGNOSIS — Z12.31 SCREENING MAMMOGRAM, ENCOUNTER FOR: ICD-10-CM

## 2021-07-16 DIAGNOSIS — Z90.11 H/O PARTIAL MASTECTOMY, RIGHT: ICD-10-CM

## 2021-07-16 DIAGNOSIS — I89.0 LYMPHEDEMA OF RIGHT UPPER EXTREMITY: ICD-10-CM

## 2021-07-16 DIAGNOSIS — R92.2 DENSE BREAST TISSUE ON MAMMOGRAM: ICD-10-CM

## 2021-07-16 DIAGNOSIS — Z85.3 HX OF BREAST CANCER: ICD-10-CM

## 2021-07-16 DIAGNOSIS — Z12.39 SCREENING BREAST EXAMINATION: ICD-10-CM

## 2021-07-16 DIAGNOSIS — Z85.3 ENCOUNTER FOR FOLLOW-UP SURVEILLANCE OF BREAST CANCER: Primary | ICD-10-CM

## 2021-07-19 ENCOUNTER — TELEPHONE (OUTPATIENT)
Dept: FAMILY MEDICINE CLINIC | Age: 66
End: 2021-07-19

## 2021-07-19 DIAGNOSIS — G89.29 CHRONIC MIDLINE LOW BACK PAIN WITHOUT SCIATICA: ICD-10-CM

## 2021-07-19 DIAGNOSIS — M54.50 CHRONIC MIDLINE LOW BACK PAIN WITHOUT SCIATICA: ICD-10-CM

## 2021-07-19 DIAGNOSIS — M17.0 PRIMARY OSTEOARTHRITIS OF BOTH KNEES: ICD-10-CM

## 2021-07-19 NOTE — TELEPHONE ENCOUNTER
Medication:   Requested Prescriptions     Pending Prescriptions Disp Refills    Diclofenac Sodium 1.5 % SOLN [Pharmacy Med Name: DICLOFENAC 1.5% TOPICAL SOLN]  0     Sig: APPLY ONE SQUIRT TOPICALLY TWICE A DAY          Patient Phone Number: [phone] (home) [phone] (work)    Last appt: 6/30/2021   Next appt: 9/30/2021    Last OARRS:   RX Monitoring 2/7/2020   Attestation -   Acute Pain Prescriptions -   Periodic Controlled Substance Monitoring No signs of potential drug abuse or diversion identified.    Chronic Pain > 80 MEDD -     PDMP Monitoring:    Last PDMP Tabby Maldonado as Reviewed [facility]):  Review User Review Instant Review Result   Leelee Martinez 6/30/2021  3:30 PM Reviewed PDMP [1]     Preferred Pharmacy:   [facility], [address] [place] [address],[address] [address]  Phone: [phone] Fax: [phone]

## 2021-07-20 RX ORDER — DICLOFENAC SODIUM 16.05 MG/ML
SOLUTION TOPICAL
Refills: 0 | OUTPATIENT
Start: 2021-07-20

## 2021-07-22 NOTE — TELEPHONE ENCOUNTER
Patient called to find out why the refill was \"not appropriate. \" She states Melody Fisher has prescribed it to her before.     Provider out of office

## 2021-07-25 DIAGNOSIS — M17.0 PRIMARY OSTEOARTHRITIS OF BOTH KNEES: ICD-10-CM

## 2021-07-25 DIAGNOSIS — G89.29 CHRONIC MIDLINE LOW BACK PAIN WITHOUT SCIATICA: ICD-10-CM

## 2021-07-25 DIAGNOSIS — M54.50 CHRONIC MIDLINE LOW BACK PAIN WITHOUT SCIATICA: ICD-10-CM

## 2021-07-25 RX ORDER — DICLOFENAC SODIUM 16.05 MG/ML
SOLUTION TOPICAL
Qty: 150 ML | Refills: 0 | Status: SHIPPED | OUTPATIENT
Start: 2021-07-25 | End: 2022-04-27

## 2021-08-14 DIAGNOSIS — G89.29 CHRONIC MIDLINE LOW BACK PAIN WITHOUT SCIATICA: ICD-10-CM

## 2021-08-14 DIAGNOSIS — E78.00 PURE HYPERCHOLESTEROLEMIA: ICD-10-CM

## 2021-08-14 DIAGNOSIS — M17.0 PRIMARY OSTEOARTHRITIS OF BOTH KNEES: ICD-10-CM

## 2021-08-14 DIAGNOSIS — M54.50 CHRONIC MIDLINE LOW BACK PAIN WITHOUT SCIATICA: ICD-10-CM

## 2021-08-14 DIAGNOSIS — E11.9 TYPE 2 DIABETES MELLITUS WITHOUT COMPLICATION, WITHOUT LONG-TERM CURRENT USE OF INSULIN (HCC): ICD-10-CM

## 2021-08-16 NOTE — TELEPHONE ENCOUNTER
Medication:   Pending Prescriptions Disp Refills    traMADol (ULTRAM) 50 MG tablet    90 tablet     pravastatin (PRAVACHOL) 20 MG tablet   90 tablet 0    metFORMIN (GLUCOPHAGE-XR) 500 MG extended release tablet  270 tablet 0      Last Filled:  7/1/21     Patient Phone Number: [phone] (home) [phone] (work)    Last appt: 6/30/2021   Next appt: 9/30/2021    Last OARRS:   RX Monitoring 2/7/2020   Attestation -   Acute Pain Prescriptions -   Periodic Controlled Substance Monitoring No signs of potential drug abuse or diversion identified.    Chronic Pain > 80 MEDD -       Last PDMP Medina Herrera as Reviewed [facility]):  Review User Review Instant Review Result   Keyla Larsen 6/30/2021  3:30 PM Reviewed PDMP [1]     Preferred Pharmacy:   [address] [place], [place] [id number]  Phone: [phone] Fax: [phone]

## 2021-08-17 RX ORDER — PRAVASTATIN SODIUM 20 MG
TABLET ORAL
Qty: 90 TABLET | Refills: 0 | Status: SHIPPED | OUTPATIENT
Start: 2021-08-17 | End: 2021-11-04

## 2021-08-17 RX ORDER — TRAMADOL HYDROCHLORIDE 50 MG/1
TABLET ORAL
Qty: 90 TABLET | Refills: 0 | Status: SHIPPED | OUTPATIENT
Start: 2021-08-17 | End: 2021-09-30 | Stop reason: SDUPTHER

## 2021-08-17 RX ORDER — METFORMIN HYDROCHLORIDE 500 MG/1
TABLET, EXTENDED RELEASE ORAL
Qty: 270 TABLET | Refills: 0 | Status: SHIPPED | OUTPATIENT
Start: 2021-08-17 | End: 2021-12-15

## 2021-08-27 ENCOUNTER — OFFICE VISIT (OUTPATIENT)
Dept: FAMILY MEDICINE CLINIC | Age: 66
End: 2021-08-27
Payer: COMMERCIAL

## 2021-08-27 VITALS
OXYGEN SATURATION: 99 % | TEMPERATURE: 97.5 F | WEIGHT: 181 LBS | DIASTOLIC BLOOD PRESSURE: 64 MMHG | HEIGHT: 60 IN | HEART RATE: 89 BPM | SYSTOLIC BLOOD PRESSURE: 108 MMHG | BODY MASS INDEX: 35.53 KG/M2

## 2021-08-27 DIAGNOSIS — G47.33 OBSTRUCTIVE SLEEP APNEA SYNDROME: Primary | ICD-10-CM

## 2021-08-27 PROCEDURE — 99213 OFFICE O/P EST LOW 20 MIN: CPT | Performed by: NURSE PRACTITIONER

## 2021-08-27 NOTE — PROGRESS NOTES
Kevin Pillai  : 1955  Encounter date: 2021    This jeromy 61 y.o. female who presents with  Chief Complaint   Patient presents with    Other     Pt would to talk about getting a new c pap        History of present illness:    HPI Pt is 61year old female with concerns regarding current CPAP equipment, reports over 6years old, reports intermittently stopping and starting during the night. Pt has not been assessed by sleep specialist in > 5 years. Pt has card with information and delivery system. Pt has not received COVID vaccination. Current Outpatient Medications on File Prior to Visit   Medication Sig Dispense Refill    traMADol (ULTRAM) 50 MG tablet TAKE ONE TABLET BY MOUTH EVERY 8 HOURS AS NEEDED FOR PAIN 90 tablet 0    pravastatin (PRAVACHOL) 20 MG tablet TAKE ONE TABLET BY MOUTH DAILY 90 tablet 0    metFORMIN (GLUCOPHAGE-XR) 500 MG extended release tablet TAKE ONE TABLET BY MOUTH THREE TIMES A DAY WITH A MEAL 270 tablet 0    Diclofenac Sodium 1.5 % SOLN APPLY ONE SQUIRT TOPICALLY TWICE A  mL 0    losartan (COZAAR) 50 MG tablet TAKE ONE TABLET BY MOUTH DAILY 90 tablet 0    omeprazole (PRILOSEC) 40 MG delayed release capsule TAKE ONE CAPSULE BY MOUTH DAILY 90 capsule 3    nystatin (NYAMYC) 264554 UNIT/GM powder Apply topically 4 times daily Apply topically 4 times daily.       metFORMIN (GLUCOPHAGE) 500 MG tablet Take 1 tablet by mouth 2 times daily (with meals) 180 tablet 1    CPAP Machine MISC by Does not apply route      citalopram (CELEXA) 10 MG tablet TAKE ONE TABLET BY MOUTH DAILY 90 tablet 3    oxybutynin (DITROPAN) 5 MG tablet Take 1 tablet by mouth 2 times daily 180 tablet 3    Multiple Vitamins-Minerals (MULTIVITAMIN ADULT PO) Take by mouth      loratadine (CLARITIN) 10 MG tablet Take 10 mg by mouth daily      Calcium Carbonate-Vit D-Min (CALCIUM 1200 PO) Take by mouth      Cholecalciferol (VITAMIN D3) 2000 units CAPS Take by mouth      vitamin C (ASCORBIC ACID) 500 MG tablet Take 500 mg by mouth daily      glucose blood VI test strips ([employer] TEST STRIPS) strip 1 each by In Vitro route daily. As needed. No current facility-administered medications on file prior to visit.       Allergies   Allergen Reactions    Advil Pm [Ibuprofen-Diphenhydramine Cit] Shortness Of Breath     All NSAID    Aspirin Shortness Of Breath    Ibuprofen Shortness Of Breath    Zithromax [Azithromycin Dihydrate] Nausea And Vomiting and Other (See Comments)     Stomach cramping    Lisinopril Other (See Comments)     Angioedema, 2018    Xanax [Alprazolam] Other (See Comments)     Slurred speech  Patient felt \"out of control\"     Past Medical History:   Diagnosis Date    Anxiety and depression     on celexa WELL CONTROLLED AS PER PT 4--    Arthritis     feet and mid to lower back    Breast cancer ([address]     Cancer ([facility]     uterine, right breast    Diabetes mellitus ([facility]     GERD (gastroesophageal reflux disease)     Hyperlipidemia     Hypertension     Overactive bladder     Sleep apnea     USES CPAP    Type II or unspecified type diabetes mellitus without mention of complication, not stated as uncontrolled     Wears glasses       Past Surgical History:   Procedure Laterality Date    ABDOMEN SURGERY      at age 10 mos--remove tissue    BREAST BIOPSY      BREAST LUMPECTOMY      BREAST SURGERY Right     cyst removed    CARPAL TUNNEL RELEASE Right 2017    CARPAL TUNNEL RELEASE Left 2017    Left  Carpal Tunnel Release & Left Ulnar Nerve decompression at the Cubital Tunnel      SECTION      COLONOSCOPY      COLONOSCOPY  10/16/2020    COLONOSCOPY POLYPECTOMY SNARE/COLD BIOPSY performed by Seferino Briggs MD at [address] [place] [address] Right 2017    Index Finger    HYSTERECTOMY      d/t uterine cancer    KNEE ARTHROSCOPY Right 2017    RIGHT KNEE ARTHROSCOPY, PARTIAL MEDIAL MENISCECTOMY    MASTECTOMY, MODIFIED RADICAL Right 2019    RIGHT LOCALIZED PARTIAL BREAST MASTECTOMY,  RIGHT AXILLARY LYMPH NODE DISSECTION, BIOZORB performed by Adilene Isaacs MD at [address] Right     TONSILLECTOMY      ULNAR TUNNEL RELEASE Right 2017    UPPER GASTROINTESTINAL ENDOSCOPY      UPPER GASTROINTESTINAL ENDOSCOPY N/A 10/16/2020    EGD BIOPSY performed by Luann Redd MD at [id number] [address] ENDOSCOPY      Family History   Problem Relation Age of Onset    COPD Mother     Heart Disease Father     Cancer Other     Diabetes Other     Heart Disease Other     High Blood Pressure Other     Seizures Other     Diabetes Sister       Social History     Tobacco Use    Smoking status: Former Smoker     Packs/day: 0.25     Years: 10.00     Pack years: 2.50     Types: Cigarettes     Quit date: 1995     Years since quittin.3    Smokeless tobacco: Never Used   Substance Use Topics    Alcohol use: No     Alcohol/week: 0.0 standard drinks        Review of Systems    Objective:    /64 (Site: Left Upper Arm, Position: Sitting, Cuff Size: Medium Adult)   Pulse 89   Temp 97.5 °F (36.4 °C) (Temporal)   Ht 5' (1.524 m)   Wt 181 lb (82.1 kg)   SpO2 99%   BMI 35.35 kg/m²   Weight: 181 lb (82.1 kg)     BP Readings from Last 3 Encounters:   21 108/64   07/15/21 118/63   21 122/82     Wt Readings from Last 3 Encounters:   21 181 lb (82.1 kg)   07/15/21 176 lb (79.8 kg)   07/15/21 181 lb (82.1 kg)     BMI Readings from Last 3 Encounters:   21 35.35 kg/m²   07/15/21 34.37 kg/m²   07/15/21 35.35 kg/m²       Physical Exam  Vitals reviewed. Constitutional:       Appearance: Normal appearance. She is well-developed. She is obese. Cardiovascular:      Rate and Rhythm: Normal rate and regular rhythm. Heart sounds: Normal heart sounds. No murmur heard. Pulmonary:      Effort: Pulmonary effort is normal.      Breath sounds: Normal breath sounds.    Skin:     General: Skin is warm and dry. Neurological:      Mental Status: She is alert and oriented to person, place, and time. Psychiatric:         Mood and Affect: Mood normal.         Assessment/Plan    1. Obstructive sleep apnea syndrome  Advised needs to be re-evaluated and be re-fitted for equipment  Scanned previous supply store and equipment ID#  - Sailaja Nelson MD, Pulmonary, [facility]      Return in about 1 month (around 9/27/2021) for annual check up, lab review. This dictation was generated by voice recognition computer software. Although all attempts are made to edit the dictation for accuracy, there may be errors in the transcription that are not intended.

## 2021-09-03 DIAGNOSIS — F32.5 MAJOR DEPRESSIVE DISORDER WITH SINGLE EPISODE, IN FULL REMISSION (HCC): ICD-10-CM

## 2021-09-03 RX ORDER — CITALOPRAM 10 MG/1
TABLET ORAL
Qty: 90 TABLET | Refills: 3 | Status: SHIPPED | OUTPATIENT
Start: 2021-09-03 | End: 2022-02-14 | Stop reason: SDUPTHER

## 2021-09-03 NOTE — TELEPHONE ENCOUNTER
Patient called in stating she thinks someone from the office called her but I'm not seeing anything in her chart.        Please call and advise

## 2021-09-07 ENCOUNTER — OFFICE VISIT (OUTPATIENT)
Dept: PULMONOLOGY | Age: 66
End: 2021-09-07
Payer: COMMERCIAL

## 2021-09-07 VITALS
HEART RATE: 79 BPM | BODY MASS INDEX: 35.53 KG/M2 | OXYGEN SATURATION: 98 % | HEIGHT: 60 IN | WEIGHT: 181 LBS | SYSTOLIC BLOOD PRESSURE: 130 MMHG | DIASTOLIC BLOOD PRESSURE: 80 MMHG

## 2021-09-07 DIAGNOSIS — E11.9 TYPE 2 DIABETES MELLITUS WITHOUT COMPLICATION, WITHOUT LONG-TERM CURRENT USE OF INSULIN (HCC): Chronic | ICD-10-CM

## 2021-09-07 DIAGNOSIS — K21.00 GASTROESOPHAGEAL REFLUX DISEASE WITH ESOPHAGITIS, UNSPECIFIED WHETHER HEMORRHAGE: Chronic | ICD-10-CM

## 2021-09-07 DIAGNOSIS — E66.9 NON MORBID OBESITY, UNSPECIFIED OBESITY TYPE: Chronic | ICD-10-CM

## 2021-09-07 DIAGNOSIS — G47.33 OBSTRUCTIVE SLEEP APNEA SYNDROME: Primary | Chronic | ICD-10-CM

## 2021-09-07 DIAGNOSIS — F32.5 MAJOR DEPRESSIVE DISORDER WITH SINGLE EPISODE, IN FULL REMISSION (HCC): Chronic | ICD-10-CM

## 2021-09-07 PROBLEM — J30.1 CHRONIC SEASONAL ALLERGIC RHINITIS DUE TO POLLEN: Chronic | Status: ACTIVE | Noted: 2018-06-15

## 2021-09-07 PROCEDURE — 99244 OFF/OP CNSLTJ NEW/EST MOD 40: CPT | Performed by: INTERNAL MEDICINE

## 2021-09-07 ASSESSMENT — ENCOUNTER SYMPTOMS
CHEST TIGHTNESS: 0
RHINORRHEA: 0
ALLERGIC/IMMUNOLOGIC NEGATIVE: 1
ABDOMINAL DISTENTION: 0
CHOKING: 0
PHOTOPHOBIA: 0
EYE PAIN: 0
APNEA: 0
SHORTNESS OF BREATH: 0
VOMITING: 0
NAUSEA: 0
ABDOMINAL PAIN: 0

## 2021-09-07 ASSESSMENT — SLEEP AND FATIGUE QUESTIONNAIRES
HOW LIKELY ARE YOU TO NOD OFF OR FALL ASLEEP WHILE SITTING AND READING: 3
HOW LIKELY ARE YOU TO NOD OFF OR FALL ASLEEP WHILE WATCHING TV: 3
HOW LIKELY ARE YOU TO NOD OFF OR FALL ASLEEP WHILE SITTING QUIETLY AFTER LUNCH WITHOUT ALCOHOL: 2
HOW LIKELY ARE YOU TO NOD OFF OR FALL ASLEEP IN A CAR, WHILE STOPPED FOR A FEW MINUTES IN TRAFFIC: 1
NECK CIRCUMFERENCE (INCHES): 15
HOW LIKELY ARE YOU TO NOD OFF OR FALL ASLEEP WHILE SITTING INACTIVE IN A PUBLIC PLACE: 1
HOW LIKELY ARE YOU TO NOD OFF OR FALL ASLEEP WHILE SITTING AND TALKING TO SOMEONE: 0
HOW LIKELY ARE YOU TO NOD OFF OR FALL ASLEEP WHEN YOU ARE A PASSENGER IN A CAR FOR AN HOUR WITHOUT A BREAK: 2
ESS TOTAL SCORE: 15
HOW LIKELY ARE YOU TO NOD OFF OR FALL ASLEEP WHILE LYING DOWN TO REST IN THE AFTERNOON WHEN CIRCUMSTANCES PERMIT: 3

## 2021-09-07 NOTE — PROGRESS NOTES
Clearance Cammie TREVIÑO, Yoanna Alba, [facility]  Tiffanie Kehrt CNP Radonna Rickers CNP Yonraym[address]  [place] [address] [facility], [address] [facility] [phone]   Health system [facility]. [address] [place] [address]. Kayley Levin [phone]     [address] [place], [place] [address] [address]. [phone]  Dept: [phone]  Loc: [phone]    Assessment:      Visit Diagnoses and Associated Orders     Obstructive sleep apnea syndrome    -  Primary    Home Sleep Study [81259 Custom]   - Future Order         Type 2 diabetes mellitus without complication, without long-term current use of insulin (HCC)   (Stable)           Gastroesophageal reflux disease with esophagitis, unspecified whether hemorrhage   (Stable)           Major depressive disorder with single episode, in full remission (HCC)   (Stable)           Non morbid obesity, unspecified obesity type   (Not Stable)                  Plan:       Reviewed JHONNY (highest likelihood Dx): pathophysiology, diagnosis, complications and treatment. Instructed her not to drive if drowsy. Continue medications per her PCP and other physicians. Limit caffeine use after 3pm.  Since no old records are available the patient will need repeat testing. Standard of care is to do in-lab polysomnography followed by CPAP titration but insurance is mandating an inferior HST followed by a 30-day AutoPap trial at home. The chronic medical conditions listed are directly related to the primary diagnosis listed above. The management of the primary diagnosis affects the secondary diagnosis and vice versa. Patient's machine is over 8years old, making loud noises at nighttime, is currently under recall, and is not auto capable. It is medically necessary for her machine to be replaced. This information was analyzed to assess complexity and medical decision making in regards to further testing and management. Continue meds for: DM, GERD, and depression.  Pt would medically benefit from wt loss for JHONNY (diet, exercise, surgical). Orders Placed This Encounter   Procedures    Home Sleep Study          Subjective:     Patient ID: Tamia Abbott is a 61 y.o. female. Chief Complaint   Patient presents with    Sleep Apnea       HPI:      Tamia Abbott is a 61 y.o. female referred by [facility] for a sleep evaluation. She complains of: She is diagnosed with sleep apnea over 10 years ago. No old records are available at the time of visit. Prior to her machine she had daytime sleepiness, snoring, witnessed apneas, and drowsiness while driving. Since being on her machine she is done quite well for the last 3 or 4 months the machine has not been operating properly, making loud noises, will suddenly drop pressure, and now she is not waking up feeling rested. Machine Modem/Download Info:  Compliance (hours/night): 4.25 hrs/night  % of nights >= 4 hrs: 70 %  Download AHI (/hour): 4.5 /HR CPAP - Settings  Pressure: 7 cmH2O                   Comfort Settings  Humidity Level (0-8): 4  Flex/EPR (0-3): 2         Chronic stable medical conditions: DM, GERD, and depression  Chronic unstable medical conditions: obesity    DOT/CDL - No  FAA/'s license -No    Previous Report(s) Reviewed: historical medical records, office notes, andreferral letter(s). Pertinent data has been documented.     Railroad - Total score: 15    Caffeine Intake - Pop/Soda: 2-3 can(s) per week    Social History     Socioeconomic History    Marital status: Single     Spouse name: Not on file    Number of children: Not on file    Years of education: Not on file    Highest education level: Not on file   Occupational History    Not on file   Tobacco Use    Smoking status: Former Smoker     Packs/day: 0.25     Years: 10.00     Pack years: 2.50     Types: Cigarettes     Quit date: 1995     Years since quittin.4    Smokeless tobacco: Never Used   Vaping Use    Vaping Use: Never used   Substance and Sexual Activity    Alcohol use: No     Alcohol/week: 0.0 standard drinks    Drug use: No    Sexual activity: Never   Other Topics Concern    Not on file   Social History Narrative    Not on file     Social Determinants of Health     Financial Resource Strain: Low Risk     Difficulty of Paying Living Expenses: Not hard at all   Food Insecurity: No Food Insecurity    Worried About Running Out of Food in the Last Year: Never true    Justin of Food in the Last Year: Never true   Transportation Needs:     Lack of Transportation (Medical):  Lack of Transportation (Non-Medical):    Physical Activity:     Days of Exercise per Week:     Minutes of Exercise per Session:    Stress:     Feeling of Stress :    Social Connections:     Frequency of Communication with Friends and Family:     Frequency of Social Gatherings with Friends and Family:     Attends Religion Services:     Active Member of Clubs or Organizations:     Attends Club or Organization Meetings:     Marital Status:    Intimate Partner Violence:     Fear of Current or Ex-Partner:     Emotionally Abused:     Physically Abused:     Sexually Abused:         Current Outpatient Medications   Medication Instructions    Calcium Carbonate-Vit D-Min (CALCIUM 1200 PO) Oral    Cholecalciferol (VITAMIN D3) 2000 units CAPS Oral    citalopram (CELEXA) 10 MG tablet TAKE ONE TABLET BY MOUTH DAILY    CPAP Machine MISC Does not apply    Diclofenac Sodium 1.5 % SOLN APPLY ONE SQUIRT TOPICALLY TWICE A DAY    glucose blood VI test strips ([employer] TEST STRIPS) strip 1 each, In Vitro, DAILY, As needed.      loratadine (CLARITIN) 10 mg, Oral, DAILY    losartan (COZAAR) 50 MG tablet TAKE ONE TABLET BY MOUTH DAILY    metFORMIN (GLUCOPHAGE) 500 mg, Oral, 2 TIMES DAILY WITH MEALS    metFORMIN (GLUCOPHAGE-XR) 500 MG extended release tablet TAKE ONE TABLET BY MOUTH THREE TIMES A DAY WITH A MEAL    Multiple Vitamins-Minerals (MULTIVITAMIN ADULT PO) Oral    nystatin (NYAMYC) 288962 UNIT/GM powder Topical, 4 TIMES DAILY, Apply topically 4 times daily.      omeprazole (PRILOSEC) 40 MG delayed release capsule TAKE ONE CAPSULE BY MOUTH DAILY    oxybutynin (DITROPAN) 5 mg, Oral, 2 TIMES DAILY    pravastatin (PRAVACHOL) 20 MG tablet TAKE ONE TABLET BY MOUTH DAILY    traMADol (ULTRAM) 50 MG tablet TAKE ONE TABLET BY MOUTH EVERY 8 HOURS AS NEEDED FOR PAIN    vitamin C (ASCORBIC ACID) 500 mg, Oral, DAILY       Allergies as of 09/07/2021 - Fully Reviewed 09/07/2021   Allergen Reaction Noted    Advil pm [ibuprofen-diphenhydramine cit] Shortness Of Breath 06/11/2010    Aspirin Shortness Of Breath 06/11/2010    Ibuprofen Shortness Of Breath     Zithromax [azithromycin dihydrate] Nausea And Vomiting and Other (See Comments) 06/11/2010    Lisinopril Other (See Comments) 04/23/2018    Xanax [alprazolam] Other (See Comments) 04/12/2017       Patient Active Problem List   Diagnosis    Obstructive sleep apnea syndrome    Esophageal reflux    Pure hypercholesterolemia    Other and unspecified hyperlipidemia    Hypertonicity of bladder    Malaise and fatigue    Symptomatic menopausal or female climacteric states    Anemia    Type 2 diabetes mellitus without complication (HCC)    Depression    Midline low back pain without sciatica    Primary osteoarthritis of both knees    History of uterine cancer    Bilateral carpal tunnel syndrome    Chronic seasonal allergic rhinitis due to pollen    Intrinsic eczema    Malignant neoplasm of upper-outer quadrant of right breast in female, estrogen receptor negative (HCC)    Abnormal mammogram    Primary breast malignancy, right ([address]    Non morbid obesity, unspecified obesity type       Past Medical History:   Diagnosis Date    Anxiety and depression     on celexa WELL CONTROLLED AS PER PT 4-11-17    Arthritis     feet and mid to lower back    Breast cancer ([address]     Cancer ([address] 2007 uterine, right breast    Diabetes mellitus [facility]     GERD (gastroesophageal reflux disease)     Hyperlipidemia     Hypertension     Overactive bladder     Sleep apnea     USES CPAP    Type II or unspecified type diabetes mellitus without mention of complication, not stated as uncontrolled     Wears glasses        Past Surgical History:   Procedure Laterality Date    ABDOMEN SURGERY      at age 10 mos--remove tissue    BREAST BIOPSY      BREAST LUMPECTOMY      BREAST SURGERY Right 2005    cyst removed    CARPAL TUNNEL RELEASE Right 2017    CARPAL TUNNEL RELEASE Left 2017    Left  Carpal Tunnel Release & Left Ulnar Nerve decompression at the Cubital Tunnel      SECTION      COLONOSCOPY      COLONOSCOPY  10/16/2020    COLONOSCOPY POLYPECTOMY SNARE/COLD BIOPSY performed by Susan Gastelum MD at [facility] Right 2017    Index Finger    HYSTERECTOMY      d/t uterine cancer    KNEE ARTHROSCOPY Right 2017    RIGHT KNEE ARTHROSCOPY, PARTIAL MEDIAL MENISCECTOMY    MASTECTOMY, MODIFIED RADICAL Right 2019    RIGHT LOCALIZED PARTIAL BREAST MASTECTOMY,  RIGHT AXILLARY LYMPH NODE DISSECTION, BIOZORB performed by Liza Bee MD at [facility] Right     TONSILLECTOMY      ULNAR TUNNEL RELEASE Right 2017    UPPER GASTROINTESTINAL ENDOSCOPY      UPPER GASTROINTESTINAL ENDOSCOPY N/A 10/16/2020    EGD BIOPSY performed by Susan Gastelum MD at [address] [place] [address]       Family History   Problem Relation Age of Onset    COPD Mother     Heart Disease Father     Cancer Other     Diabetes Other     Heart Disease Other     High Blood Pressure Other     Seizures Other     Diabetes Sister     Sleep Apnea Sister     Sleep Apnea Sister     Sleep Apnea Son        Review of Systems   Constitutional: Positive for fatigue and unexpected weight change. Negative for activity change and appetite change.    HENT: Positive for congestion. Negative for nosebleeds, postnasal drip, rhinorrhea and sneezing. Eyes: Negative for photophobia, pain and visual disturbance. Respiratory: Negative for apnea, choking, chest tightness and shortness of breath. Cardiovascular: Negative. Gastrointestinal: Negative for abdominal distention, abdominal pain, nausea and vomiting. Endocrine: Negative for cold intolerance and heat intolerance. Genitourinary: Negative for difficulty urinating, dysuria, frequency and urgency. Musculoskeletal: Negative. Negative for neck pain and neck stiffness. Skin: Negative. Allergic/Immunologic: Negative. Neurological: Negative for tremors, seizures, syncope and weakness. Hematological: Negative for adenopathy. Does not bruise/bleed easily. Psychiatric/Behavioral: Positive for sleep disturbance. Negative for agitation, behavioral problems and confusion. Objective:     Vitals:  Weight BMI   Wt Readings from Last 3 Encounters:   09/07/21 181 lb (82.1 kg)   08/27/21 181 lb (82.1 kg)   07/15/21 176 lb (79.8 kg)    Body mass index is 35.35 kg/m². BP HR SaO2   BP Readings from Last 3 Encounters:   09/07/21 130/80   08/27/21 108/64   07/15/21 118/63    Pulse Readings from Last 3 Encounters:   09/07/21 79   08/27/21 89   07/15/21 78    SpO2 Readings from Last 3 Encounters:   09/07/21 98%   08/27/21 99%   07/15/21 97%        Physical Exam  Vitals reviewed. Constitutional:       General: She is not in acute distress. Appearance: Normal appearance. She is well-developed. She is obese. She is not toxic-appearing or diaphoretic. HENT:      Head: Normocephalic and atraumatic. Not macrocephalic and not microcephalic. Right Ear: External ear normal.      Left Ear: External ear normal.      Nose: Septal deviation and mucosal edema present. No nasal deformity. Mouth/Throat:      Lips: Pink. Mouth: Mucous membranes are moist.      Dentition: Has dentures. Tongue: No lesions.

## 2021-09-07 NOTE — LETTER
[facility] Sleep Medicine  [id number]  [address] [place] [address]  Phone: [phone]  Fax: [phone]           Tono Martin MD      September 7, 2021     Patient: Jordy Lazo   MR Number: [id number]   YOB: 1955   Date of Visit: 9/7/2021       Dear Dr. Dilia Faustin:    Thank you for referring Sydni Bronson to me for evaluation/treatment. Below are the relevant portions of my assessment and plan of care. Visit Diagnoses and Associated Orders     Obstructive sleep apnea syndrome    -  Primary    Home Sleep Study [00753 Custom]   - Future Order         Type 2 diabetes mellitus without complication, without long-term current use of insulin (HCC)   (Stable)           Gastroesophageal reflux disease with esophagitis, unspecified whether hemorrhage   (Stable)           Major depressive disorder with single episode, in full remission (HCC)   (Stable)           Non morbid obesity, unspecified obesity type   (Not Stable)                 Reviewed JHONNY (highest likelihood Dx): pathophysiology, diagnosis, complications and treatment. Instructed her not to drive if drowsy. Continue medications per her PCP and other physicians. Limit caffeine use after 3pm.  Since no old records are available the patient will need repeat testing. Standard of care is to do in-lab polysomnography followed by CPAP titration but insurance is mandating an inferior HST followed by a 30-day AutoPap trial at home. The chronic medical conditions listed are directly related to the primary diagnosis listed above. The management of the primary diagnosis affects the secondary diagnosis and vice versa. Patient's machine is over 8years old, making loud noises at nighttime, is currently under recall, and is not auto capable. It is medically necessary for her machine to be replaced.     This information was analyzed to assess complexity and medical decision making in regards to further testing and management. Continue meds for: DM, GERD, and depression. Pt would medically benefit from wt loss for JHONNY (diet, exercise, surgical). Orders Placed This Encounter   Procedures    Home Sleep Study       If you have questions, please do not hesitate to call me. I look forward to following Vu Scott along with you.     Sincerely,        Bridger Harris MD    CC providers:  [address] [place] [address], APRN - NP  [address] [place] [id number]  [facility]

## 2021-09-21 ENCOUNTER — TELEPHONE (OUTPATIENT)
Dept: FAMILY MEDICINE CLINIC | Age: 66
End: 2021-09-21

## 2021-09-28 ENCOUNTER — HOSPITAL ENCOUNTER (OUTPATIENT)
Age: 66
Discharge: HOME | End: 2021-09-28
Payer: COMMERCIAL

## 2021-09-28 DIAGNOSIS — E11.9 TYPE 2 DIABETES MELLITUS WITHOUT COMPLICATION, WITHOUT LONG-TERM CURRENT USE OF INSULIN (HCC): ICD-10-CM

## 2021-09-28 LAB
A/G RATIO: 1.3 (ref 1.1–2.2)
ALBUMIN SERPL-MCNC: 4.2 G/DL (ref 3.4–5)
ALP BLD-CCNC: 82 U/L (ref 40–129)
ALT SERPL-CCNC: 19 U/L (ref 10–40)
ANION GAP SERPL CALCULATED.3IONS-SCNC: 14 MMOL/L (ref 3–16)
AST SERPL-CCNC: 20 U/L (ref 15–37)
BASOPHILS ABSOLUTE: 0 K/UL (ref 0–0.2)
BASOPHILS RELATIVE PERCENT: 0.9 %
BILIRUB SERPL-MCNC: 0.4 MG/DL (ref 0–1)
BUN BLDV-MCNC: 22 MG/DL (ref 7–20)
CALCIUM SERPL-MCNC: 10.1 MG/DL (ref 8.3–10.6)
CHLORIDE BLD-SCNC: 100 MMOL/L (ref 99–110)
CHOLESTEROL, FASTING: 152 MG/DL (ref 0–199)
CO2: 25 MMOL/L (ref 21–32)
CREAT SERPL-MCNC: 1 MG/DL (ref 0.6–1.2)
CREATININE URINE: 92.7 MG/DL (ref 28–259)
EOSINOPHILS ABSOLUTE: 0.1 K/UL (ref 0–0.6)
EOSINOPHILS RELATIVE PERCENT: 2.5 %
GFR AFRICAN AMERICAN: >60
GFR NON-AFRICAN AMERICAN: 56
GLOBULIN: 3.2 G/DL
GLUCOSE FASTING: 118 MG/DL (ref 70–99)
HCT VFR BLD CALC: 34.1 % (ref 36–48)
HDLC SERPL-MCNC: 50 MG/DL (ref 40–60)
HEMOGLOBIN: 11.6 G/DL (ref 12–16)
LDL CHOLESTEROL CALCULATED: 80 MG/DL
LYMPHOCYTES ABSOLUTE: 1.5 K/UL (ref 1–5.1)
LYMPHOCYTES RELATIVE PERCENT: 28.8 %
MCH RBC QN AUTO: 30.5 PG (ref 26–34)
MCHC RBC AUTO-ENTMCNC: 34 G/DL (ref 31–36)
MCV RBC AUTO: 89.8 FL (ref 80–100)
MICROALBUMIN UR-MCNC: <1.2 MG/DL
MICROALBUMIN/CREAT UR-RTO: NORMAL MG/G (ref 0–30)
MONOCYTES ABSOLUTE: 0.4 K/UL (ref 0–1.3)
MONOCYTES RELATIVE PERCENT: 7 %
NEUTROPHILS ABSOLUTE: 3.2 K/UL (ref 1.7–7.7)
NEUTROPHILS RELATIVE PERCENT: 60.8 %
PDW BLD-RTO: 13 % (ref 12.4–15.4)
PLATELET # BLD: 206 K/UL (ref 135–450)
PMV BLD AUTO: 8.6 FL (ref 5–10.5)
POTASSIUM SERPL-SCNC: 4.5 MMOL/L (ref 3.5–5.1)
RBC # BLD: 3.8 M/UL (ref 4–5.2)
SODIUM BLD-SCNC: 139 MMOL/L (ref 136–145)
TOTAL PROTEIN: 7.4 G/DL (ref 6.4–8.2)
TRIGLYCERIDE, FASTING: 109 MG/DL (ref 0–150)
VLDLC SERPL CALC-MCNC: 22 MG/DL
WBC # BLD: 5.2 K/UL (ref 4–11)

## 2021-09-28 PROCEDURE — 36415 COLL VENOUS BLD VENIPUNCTURE: CPT

## 2021-09-28 PROCEDURE — 85025 COMPLETE CBC W/AUTO DIFF WBC: CPT

## 2021-09-28 PROCEDURE — 80053 COMPREHEN METABOLIC PANEL: CPT

## 2021-09-28 PROCEDURE — 82043 UR ALBUMIN QUANTITATIVE: CPT

## 2021-09-28 PROCEDURE — 83036 HEMOGLOBIN GLYCOSYLATED A1C: CPT

## 2021-09-28 PROCEDURE — 82570 ASSAY OF URINE CREATININE: CPT

## 2021-09-28 PROCEDURE — 80061 LIPID PANEL: CPT

## 2021-09-29 LAB
ESTIMATED AVERAGE GLUCOSE: 145.6 MG/DL
HBA1C MFR BLD: 6.7 %

## 2021-09-30 ENCOUNTER — OFFICE VISIT (OUTPATIENT)
Dept: FAMILY MEDICINE CLINIC | Age: 66
End: 2021-09-30
Payer: COMMERCIAL

## 2021-09-30 VITALS
TEMPERATURE: 98.2 F | BODY MASS INDEX: 34.76 KG/M2 | HEART RATE: 71 BPM | SYSTOLIC BLOOD PRESSURE: 125 MMHG | WEIGHT: 178 LBS | DIASTOLIC BLOOD PRESSURE: 73 MMHG

## 2021-09-30 DIAGNOSIS — M17.0 PRIMARY OSTEOARTHRITIS OF BOTH KNEES: ICD-10-CM

## 2021-09-30 DIAGNOSIS — E78.00 PURE HYPERCHOLESTEROLEMIA: Primary | ICD-10-CM

## 2021-09-30 DIAGNOSIS — E11.9 TYPE 2 DIABETES MELLITUS WITHOUT COMPLICATION, WITHOUT LONG-TERM CURRENT USE OF INSULIN (HCC): Chronic | ICD-10-CM

## 2021-09-30 DIAGNOSIS — G89.29 CHRONIC MIDLINE LOW BACK PAIN WITHOUT SCIATICA: ICD-10-CM

## 2021-09-30 DIAGNOSIS — M54.50 CHRONIC MIDLINE LOW BACK PAIN WITHOUT SCIATICA: ICD-10-CM

## 2021-09-30 PROCEDURE — 99214 OFFICE O/P EST MOD 30 MIN: CPT | Performed by: NURSE PRACTITIONER

## 2021-09-30 RX ORDER — TRAMADOL HYDROCHLORIDE 50 MG/1
50 TABLET ORAL EVERY 8 HOURS PRN
Qty: 90 TABLET | Refills: 0 | Status: SHIPPED | OUTPATIENT
Start: 2021-09-30 | End: 2021-11-17 | Stop reason: SDUPTHER

## 2021-09-30 NOTE — PROGRESS NOTES
Nery Haynes  : 1955  Encounter date: 2021    This jeromy 61 y.o. female who presents with  Chief Complaint   Patient presents with    Diabetes     med check        History of present illness:    HPI PT is 61year old female for type 2 diabetes, hyperlipidemia and hypertension. Recent labs reviewed showed stage 3 CKD, well controlled hgbA1c- 6.7 and anemia. Reports BP well controlled. Recent OV with Dr. Devin Ibarra for JHONNY evaluation and CPAP supplies. Pt is currently taking metformin 500 mg BID, following decrease. Pt with OA, taking tramadol, well controlled. Pt is moving to [facility] end of year with son. Reports receiving 1/2 Covid vaccinations. Current Outpatient Medications on File Prior to Visit   Medication Sig Dispense Refill    citalopram (CELEXA) 10 MG tablet TAKE ONE TABLET BY MOUTH DAILY 90 tablet 3    pravastatin (PRAVACHOL) 20 MG tablet TAKE ONE TABLET BY MOUTH DAILY 90 tablet 0    metFORMIN (GLUCOPHAGE-XR) 500 MG extended release tablet TAKE ONE TABLET BY MOUTH THREE TIMES A DAY WITH A MEAL 270 tablet 0    Diclofenac Sodium 1.5 % SOLN APPLY ONE SQUIRT TOPICALLY TWICE A  mL 0    losartan (COZAAR) 50 MG tablet TAKE ONE TABLET BY MOUTH DAILY 90 tablet 0    omeprazole (PRILOSEC) 40 MG delayed release capsule TAKE ONE CAPSULE BY MOUTH DAILY 90 capsule 3    nystatin (NYAMYC) 429149 UNIT/GM powder Apply topically 4 times daily Apply topically 4 times daily.       metFORMIN (GLUCOPHAGE) 500 MG tablet Take 1 tablet by mouth 2 times daily (with meals) 180 tablet 1    CPAP Machine MISC by Does not apply route      oxybutynin (DITROPAN) 5 MG tablet Take 1 tablet by mouth 2 times daily 180 tablet 3    Multiple Vitamins-Minerals (MULTIVITAMIN ADULT PO) Take by mouth      loratadine (CLARITIN) 10 MG tablet Take 10 mg by mouth daily      Calcium Carbonate-Vit D-Min (CALCIUM 1200 PO) Take by mouth      Cholecalciferol (VITAMIN D3) 2000 units CAPS Take by mouth      vitamin C (ASCORBIC ACID) 500 MG tablet Take 500 mg by mouth daily      glucose blood VI test strips ([employer] TEST STRIPS) strip 1 each by In Vitro route daily. As needed. No current facility-administered medications on file prior to visit.       Allergies   Allergen Reactions    Advil Pm [Ibuprofen-Diphenhydramine Cit] Shortness Of Breath     All NSAID    Aspirin Shortness Of Breath    Ibuprofen Shortness Of Breath    Zithromax [Azithromycin Dihydrate] Nausea And Vomiting and Other (See Comments)     Stomach cramping    Lisinopril Other (See Comments)     Angioedema, 2018    Xanax [Alprazolam] Other (See Comments)     Slurred speech  Patient felt \"out of control\"     Past Medical History:   Diagnosis Date    Anxiety and depression     on celexa WELL CONTROLLED AS PER PT 4--17    Arthritis     feet and mid to lower back    [facility]     Cancer ([facility]     uterine, right breast    Diabetes mellitus ([facility]     GERD (gastroesophageal reflux disease)     Hyperlipidemia     Hypertension     Overactive bladder     Sleep apnea     USES CPAP    Type II or unspecified type diabetes mellitus without mention of complication, not stated as uncontrolled     Wears glasses       Past Surgical History:   Procedure Laterality Date    ABDOMEN SURGERY      at age 10 mos--remove tissue    BREAST BIOPSY      BREAST LUMPECTOMY      BREAST SURGERY Right     cyst removed    CARPAL TUNNEL RELEASE Right 2017    CARPAL TUNNEL RELEASE Left 2017    Left  Carpal Tunnel Release & Left Ulnar Nerve decompression at the Cubital Tunnel      SECTION      COLONOSCOPY      COLONOSCOPY  10/16/2020    COLONOSCOPY POLYPECTOMY SNARE/COLD BIOPSY performed by Fabiola Durant MD at [address] [place] [address] Right 2017    Index Finger    HYSTERECTOMY      d/t uterine cancer    KNEE ARTHROSCOPY Right 2017    RIGHT KNEE ARTHROSCOPY, PARTIAL MEDIAL MENISCECTOMY    MASTECTOMY, MODIFIED RADICAL Right 2019    RIGHT LOCALIZED PARTIAL BREAST MASTECTOMY,  RIGHT AXILLARY LYMPH NODE DISSECTION, BIOZORB performed by Kelsey Marques MD at [address] Right     TONSILLECTOMY      ULNAR TUNNEL RELEASE Right 2017    UPPER GASTROINTESTINAL ENDOSCOPY      UPPER GASTROINTESTINAL ENDOSCOPY N/A 10/16/2020    EGD BIOPSY performed by Seferino Briggs MD at [address]      Family History   Problem Relation Age of Onset    COPD Mother     Heart Disease Father     Cancer Other     Diabetes Other     Heart Disease Other     High Blood Pressure Other     Seizures Other     Diabetes Sister     Sleep Apnea Sister     Sleep Apnea Sister     Sleep Apnea Son       Social History     Tobacco Use    Smoking status: Former Smoker     Packs/day: 0.25     Years: 10.00     Pack years: 2.50     Types: Cigarettes     Quit date: 1995     Years since quittin.4    Smokeless tobacco: Never Used   Substance Use Topics    Alcohol use: No     Alcohol/week: 0.0 standard drinks        Review of Systems    Objective:    /73   Pulse 71   Temp 98.2 °F (36.8 °C)   Wt 178 lb (80.7 kg)   BMI 34.76 kg/m²   Weight: 178 lb (80.7 kg)     BP Readings from Last 3 Encounters:   21 125/73   21 130/80   21 108/64     Wt Readings from Last 3 Encounters:   21 178 lb (80.7 kg)   21 181 lb (82.1 kg)   21 181 lb (82.1 kg)     BMI Readings from Last 3 Encounters:   21 34.76 kg/m²   21 35.35 kg/m²   21 35.35 kg/m²       Physical Exam  Vitals reviewed. Constitutional:       Appearance: Normal appearance. She is well-developed. She is obese. Cardiovascular:      Rate and Rhythm: Normal rate and regular rhythm. Heart sounds: Normal heart sounds. No murmur heard. Pulmonary:      Effort: Pulmonary effort is normal.      Breath sounds: Normal breath sounds. Musculoskeletal:      Right lower leg: No edema. Left lower leg: No edema. Skin:     General: Skin is warm and dry. Neurological:      Mental Status: She is alert and oriented to person, place, and time. Psychiatric:         Mood and Affect: Mood normal.     Visual inspection:  Deformity/amputation: absent  Skin lesions/pre-ulcerative calluses: absent  Edema: right- negative, left- negative    Sensory exam:  Monofilament sensation: normal  (minimum of 5 random plantar locations tested, avoiding callused areas - > 1 area with absence of sensation is + for neuropathy)    Plus at least one of the following:  Pulses: normal,   Pinprick: Intact  Proprioception: Intact  Vibration (128 Hz): N/A      Assessment/Plan    1. Primary osteoarthritis of both knees  Controlled  [place]AR[place] reviewed  - traMADol (ULTRAM) 50 MG tablet; Take 1 tablet by mouth every 8 hours as needed for Pain for up to 30 days. Dispense: 90 tablet; Refill: 0    2. Chronic midline low back pain without sciatica  - traMADol (ULTRAM) 50 MG tablet; Take 1 tablet by mouth every 8 hours as needed for Pain for up to 30 days. Dispense: 90 tablet; Refill: 0    3. Pure hypercholesterolemia  Controlled  Reviewed labs    4. Type 2 diabetes mellitus without complication, without long-term current use of insulin ([facility]  Controlled  -  DIABETES FOOT EXAM      Return in about 3 months (around 12/30/2021) for medication re-check. This dictation was generated by voice recognition computer software. Although all attempts are made to edit the dictation for accuracy, there may be errors in the transcription that are not intended.

## 2021-10-05 ENCOUNTER — HOSPITAL ENCOUNTER (OUTPATIENT)
Dept: SLEEP CENTER | Age: 66
Discharge: HOME | End: 2021-10-05
Payer: COMMERCIAL

## 2021-10-05 DIAGNOSIS — G47.33 OBSTRUCTIVE SLEEP APNEA SYNDROME: Chronic | ICD-10-CM

## 2021-10-05 PROCEDURE — 95806 SLEEP STUDY UNATT&RESP EFFT: CPT

## 2021-10-10 PROCEDURE — 95806 SLEEP STUDY UNATT&RESP EFFT: CPT | Performed by: INTERNAL MEDICINE

## 2021-10-11 ENCOUNTER — TELEPHONE (OUTPATIENT)
Dept: PULMONOLOGY | Age: 66
End: 2021-10-11

## 2021-10-11 NOTE — PROGRESS NOTES
Toi Orozco         : 1955 [facility]    Diagnosis: [x] JHONNY (G47.33) [] CSA (G47.31) [] Apnea (G47.30)   Length of Need: [x] 12 Months [] 99 Months [] Other:    Machine (BUTCH!): [] Respironics Dream Station   2   Auto [x] ResMed AirSense     Auto S11 [] Other:     [x]  CPAP () [] Bilevel ()   Mode: [x] Auto [] Spontaneous    Mode: [] Auto [] Spontaneous      P min 7 cmH2O  P max 17 cmH2O      Comfort Settings:   - Ramp Pressure: 4 cmH2O                                        - Ramp time: 15 min                                     -  Flex/EPR - 3 full time                                    - For ResMed Bilevel (TiMax-4 sec   TiMin- 0.2 sec)     Humidifier: [x] Heated ()        [x] Water chamber replacement ()/ 1 per 6 months        Mask:   [x] Nasal () /1 per 3 months [] Full Face () /1 per 3 months   [x] Patient choice -Size and fit mask [] Patient Choice - Size and fit mask   [] Dispense:  [] Dispense:    [x] Headgear () / 1 per 3 months [] Headgear () / 1 per 3 months   [x] Replacement Nasal Cushion ()/2 per month [] Interface Replacement ()/1 per month   [] Replacement Nasal Pillows ()/2 per month         Tubing: [x] Heated ()/1 per 3 months    [] Standard ()/1 per 3 months [] Other:           Filters: [x] Non-disposable ()/1 per 6 months     [x] Ultra-Fine, Disposable ()/2 per month        Miscellaneous: [] Chin Strap ()/ 1 per 6 months [] O2 bleed-in:       LPM   [] Oximetry on CPAP/Bilevel []  Other:    [x] Modem: ()         Start Order Date: 10/11/21    MEDICAL JUSTIFICATION:  I, the undersigned, certify that the above prescribed supplies are medically necessary for this patients wellbeing. In my opinion, the supplies are both reasonable and necessary in reference to accepted standards of medicalpractice in treatment of this patients condition.     Jean Carlos Salamanca MD      NPI: [id number]       Order Signed Date: 10/11/21    Electronically signed by Tiffanie Muro MD on 10/11/2021 at 11:28 AM    Stewart Villafuerte  1955  [address] [place] [address]  [phone] (home) [phone] (work)  [phone] (mobile)      Insurance Info (confirm with patient if correct):  Payor/Plan Subscr  Sex Relation Sub.  Ins. ID Effective Group Num

## 2021-10-13 RX ORDER — LOSARTAN POTASSIUM 50 MG/1
TABLET ORAL
Qty: 90 TABLET | Refills: 1 | Status: SHIPPED | OUTPATIENT
Start: 2021-10-13 | End: 2022-04-27

## 2021-10-13 NOTE — TELEPHONE ENCOUNTER
Medication:   Requested Prescriptions     Pending Prescriptions Disp Refills    losartan (COZAAR) 50 MG tablet [Pharmacy Med Name: LOSARTAN POTASSIUM 50 MG TAB] 90 tablet 1     Sig: TAKE ONE TABLET BY MOUTH DAILY        Last Filled: 7/13/2021      Patient Phone Number: [phone] (home) [phone] (work)    Last appt: 9/30/2021   Next appt: 12/28/2021    Last OARRS:   RX Monitoring 2/7/2020   Attestation -   Acute Pain Prescriptions -   Periodic Controlled Substance Monitoring No signs of potential drug abuse or diversion identified.    Chronic Pain > 80 MEDD -

## 2021-11-04 DIAGNOSIS — E78.00 PURE HYPERCHOLESTEROLEMIA: ICD-10-CM

## 2021-11-04 RX ORDER — PRAVASTATIN SODIUM 20 MG
TABLET ORAL
Qty: 90 TABLET | Refills: 0 | Status: SHIPPED | OUTPATIENT
Start: 2021-11-04 | End: 2022-02-14 | Stop reason: SDUPTHER

## 2021-11-04 NOTE — TELEPHONE ENCOUNTER
Medication:   Requested Prescriptions     Pending Prescriptions Disp Refills    pravastatin (PRAVACHOL) 20 MG tablet [Pharmacy Med Name: PRAVASTATIN SODIUM 20 MG TAB] 90 tablet 0     Sig: TAKE ONE TABLET BY MOUTH DAILY      Last Filled:      Patient Phone Number: [phone] (home) [phone] (work)    Last appt: 9/30/2021   Next appt: 12/28/2021    Last OARRS:   RX Monitoring 2/7/2020   Attestation -   Acute Pain Prescriptions -   Periodic Controlled Substance Monitoring No signs of potential drug abuse or diversion identified.    Chronic Pain > 80 MEDD -     PDMP Monitoring:    Last PDMP Orion Jenkins as Reviewed [facility]):  Review User Review Instant Review Result   Oval Serum 9/30/2021  2:10 PM Reviewed PDMP [1]     Preferred Pharmacy:   [facility], [address] [place] [address] Drive [address]  [address] [place] [address]  Phone: [phone] Fax: [phone]

## 2021-11-17 DIAGNOSIS — M17.0 PRIMARY OSTEOARTHRITIS OF BOTH KNEES: ICD-10-CM

## 2021-11-17 DIAGNOSIS — G89.29 CHRONIC MIDLINE LOW BACK PAIN WITHOUT SCIATICA: ICD-10-CM

## 2021-11-17 DIAGNOSIS — M54.50 CHRONIC MIDLINE LOW BACK PAIN WITHOUT SCIATICA: ICD-10-CM

## 2021-11-17 NOTE — TELEPHONE ENCOUNTER
Medication:   Requested Prescriptions     Pending Prescriptions Disp Refills    traMADol (ULTRAM) 50 MG tablet 90 tablet 0     Sig: Take 1 tablet by mouth every 8 hours as needed for Pain for up to 30 days. Last Filled: 9/30/2021      Patient Phone Number: [phone] (home) [phone] (work)    Last appt: 9/30/2021   Next appt: 12/28/2021    Last OARRS:   RX Monitoring 2/7/2020   Attestation -   Acute Pain Prescriptions -   Periodic Controlled Substance Monitoring No signs of potential drug abuse or diversion identified.    Chronic Pain > 80 MEDD -

## 2021-11-18 RX ORDER — TRAMADOL HYDROCHLORIDE 50 MG/1
50 TABLET ORAL EVERY 8 HOURS PRN
Qty: 90 TABLET | Refills: 0 | Status: SHIPPED | OUTPATIENT
Start: 2021-11-18 | End: 2021-12-28 | Stop reason: SDUPTHER

## 2021-11-22 ENCOUNTER — OFFICE VISIT (OUTPATIENT)
Dept: FAMILY MEDICINE CLINIC | Age: 66
End: 2021-11-22
Payer: COMMERCIAL

## 2021-11-22 VITALS — TEMPERATURE: 98.6 F | OXYGEN SATURATION: 98 % | HEART RATE: 82 BPM

## 2021-11-22 DIAGNOSIS — J20.9 ACUTE BRONCHITIS, UNSPECIFIED ORGANISM: Primary | ICD-10-CM

## 2021-11-22 PROCEDURE — 99213 OFFICE O/P EST LOW 20 MIN: CPT | Performed by: FAMILY MEDICINE

## 2021-11-22 ASSESSMENT — PATIENT HEALTH QUESTIONNAIRE - PHQ9
2. FEELING DOWN, DEPRESSED OR HOPELESS: 0
1. LITTLE INTEREST OR PLEASURE IN DOING THINGS: 0
SUM OF ALL RESPONSES TO PHQ QUESTIONS 1-9: 0
SUM OF ALL RESPONSES TO PHQ9 QUESTIONS 1 & 2: 0
SUM OF ALL RESPONSES TO PHQ QUESTIONS 1-9: 0
SUM OF ALL RESPONSES TO PHQ QUESTIONS 1-9: 0

## 2021-11-22 NOTE — PROGRESS NOTES
Assessment:      Ismael Rivera was seen today for cough and congestion. Diagnoses and all orders for this visit:    Acute bronchitis, unspecified organism  -     COVID-19    Other orders  -     Cancel: COVID-19  -     Cancel: COVID-19  -     Cancel: COVID-19            Plan:      Humidification of the bedroom was discussed.   Maintain over-the-counter medication when necessary  Advised that if her Covid test was negative we would start her on antibiotic therapy  RTC when necessary    Medical decision making of low complexity            Sherryle Divine, MD

## 2021-11-23 LAB — SARS-COV-2: DETECTED

## 2021-12-02 ENCOUNTER — TELEPHONE (OUTPATIENT)
Dept: FAMILY MEDICINE CLINIC | Age: 66
End: 2021-12-02

## 2021-12-02 NOTE — TELEPHONE ENCOUNTER
----- Message from Orlando Payne sent at 12/2/2021  3:04 PM EST -----  Subject: Message to Provider    QUESTIONS  Information for Provider? Patient needs a statement from PCP to confirm   she was DX with covid so she can get paid for being off work. Please call   patient and advise.  ---------------------------------------------------------------------------  --------------  CALL BACK INFO  What is the best way for the office to contact you? OK to leave message on   voicemail  Preferred Call Back Phone Number? [phone]  ---------------------------------------------------------------------------  --------------  SCRIPT ANSWERS  Relationship to Patient?  Self

## 2021-12-06 NOTE — PROGRESS NOTES
PCP:  Medical Oncology: long  Radiation: grass  Other:        [id number]  STAGE:  IIB right breast cancer      Ms. Evetta Aschoff is a 61y.o.-year-old woman who initially presented to me with  right breast cancer. Since her last encounter Ms. Evetta Aschoff has been doing well. She is completed neoadjuvant therapy as well as surgery. Her adjuvant treatment has included radiation. She has worked with physical therapy for breast lymphedema in the past.  She has had erythema which in the past was suspected to be radiation recall. There is no sign of this today. She has occasional tenderness along her right chest wall. She notices some skin irritation around her port from the mammogram today. This was otherwise not present. She is moving to [place] in January 2022 to join her children/grandchildren. Once established with a physician there she plans to reach out for record transfer. INTERVAL HISTORY:     On 11/16/2018 she initiated neoadjuvant chemotherapy with ddAC ->T    She completed neoadjuvant chemotherapy in April 2019. On 5/7/2019 she underwent a right partial mastectomy with axillary lymph node dissection. Pathology identified 1.6 cm of grade 3 invasive ductal carcinoma. ER negative GA negative HER-2 negative. Margins were negative. Disease approach the posterior margin but additional deep margin was taken with dissection was carried to the chest wall. There was 1/11 lymph nodes involved carcinoma.  [id number]. From 6/25/2019 to 8/6/2019 she underwent right breast radiation    On 12/4/2019 she underwent bilateral breast imaging. Postsurgical changes are noted in the right breast.  There is some skin thickening suggested to relate to posttreatment change. There are no new concerning findings suggestive of malignancy in either breast.  BI-RADS 3. On 7/2/2020 she underwent interval right breast imaging.   Postsurgical changes are again noted in the upper outer right breast.  These are not substantially changed since December 2019. There are no new suspicious findings concerning for malignancy. BI-RADS 2. 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 Accession [id number]. On 1/7/2020 when she underwent bilateral breast imaging. Benign findings are noted in the right breast which appear stable. There is no evidence concerning for malignancy identified in either breast.  BI-RADS 2. On 1/7/2021 she underwent bilateral breast imaging. Stable mammographic findings are noted following breast conservation in the right breast.  There are no new concerning findings suspicious of malignancy in either breast.  BI-RADS 2. On 7/15/2021 she underwent right diagnostic breast imaging. Postsurgical changes are noted in the right breast. The internal scar appears stable. Stable postsurgical changes are noted in the location of the right excisional biopsy as well. Skin thickening of the breast is stable. There are no new concerning findings suggestive of malignancy. BI-RADS 2. On 12/9/2021 she underwent bilateral breast imaging. Likely postsurgical changes are noted in the superior right breast.  Interval follow-up in 6 months is recommended. BI-RADS 3. Exam:  Physical exam has been reviewed and updated  General: no acute distress  Breast:  The patient was examined in the upright and supine position. There is a well healed scar on the right breast. There is a similarly well healed ipsilateral axillary scar. There are expected  post surgical and radiation related changes. The previous mild erythema has resolved however edematous changes are still present. There is firmness over her surgical bed which is unchanged. (12/9/2021). She has some skin irritation around her right chest wall port-reported to have occurred today with mammography. She has good range of motion with her arm.   Her right arm however is slightly larger and more swollen than the left. This is most notable in her distal upper extremity. Her contralateral breast shows no new masses or changes in breast contour. There were no skin changes of the breast or nipple areolar complex. There was no nipple inversion or discharge. Respiratory: respirations are non-labored and there is no audible distress  Cardiovascular: regular rate, extremities appear well perfused  Neurologic: alert, oriented      Assessment/Plan:  [id number]  STAGE:  IIB right breast cancer  ER/SC/HER2 negative  S/p neoadjuvant chemotherapy  S/p PM with SLNB  S/p XRT    We reviewed her most recent breast imaging and physical exam findings. Follow-up recommendations per imaging were reviewed in detail and discussed that she will be moving to [facility] in the near future. Signs/symptoms of recurrence were reviewed. She verbalizes understanding that she should notify our office if she identifies any abnormalities on self evaluation as it may require further workup. I encouraged her to continue self breast evaluation. Follow up surveillance was discussed. Her genetic testing results were reviewed. At this time there is no plan to support increased screening above average risk. Lymphedema management as needed. She is interested in port removal, potentially prior to her move. I discussed the need for her to review this with her medical oncologist. If inclined to move forward we reviewed the technique, risks and benefits of port removal. We discussed the risks and benefits of surgery which include but are not limited to bleeding, infection, wound complications and unappealing cosmetics. We reviewed expectations for recovery. We will plan to have her back to the surgical oncology office on an as-needed basis prior to her move to [facility]. We are available to her in the future and are happy to forward our records.  She understands notify the office if interested in port removal.        All of the patient's questions were answered at this time however, she was encouraged to call the office with any further inquiries. Approximately 25 minutes of time were spent in preparation, direct patient contact, care coordination, documentation and activities otherwise related to this encounter.

## 2021-12-09 ENCOUNTER — OFFICE VISIT (OUTPATIENT)
Dept: SURGERY | Age: 66
End: 2021-12-09
Payer: COMMERCIAL

## 2021-12-09 ENCOUNTER — HOSPITAL ENCOUNTER (OUTPATIENT)
Dept: WOMENS IMAGING | Age: 66
Discharge: HOME | End: 2021-12-09
Payer: COMMERCIAL

## 2021-12-09 VITALS — BODY MASS INDEX: 35.34 KG/M2 | WEIGHT: 180 LBS | HEIGHT: 60 IN

## 2021-12-09 VITALS
BODY MASS INDEX: 34.95 KG/M2 | SYSTOLIC BLOOD PRESSURE: 141 MMHG | HEART RATE: 84 BPM | WEIGHT: 178 LBS | RESPIRATION RATE: 18 BRPM | OXYGEN SATURATION: 98 % | DIASTOLIC BLOOD PRESSURE: 64 MMHG | HEIGHT: 60 IN

## 2021-12-09 DIAGNOSIS — Z90.11 H/O PARTIAL MASTECTOMY, RIGHT: ICD-10-CM

## 2021-12-09 DIAGNOSIS — Z08 ENCOUNTER FOR FOLLOW-UP SURVEILLANCE OF BREAST CANCER: ICD-10-CM

## 2021-12-09 DIAGNOSIS — Z12.31 BREAST CANCER SCREENING BY MAMMOGRAM: ICD-10-CM

## 2021-12-09 DIAGNOSIS — R92.8 ABNORMAL MAMMOGRAM: ICD-10-CM

## 2021-12-09 DIAGNOSIS — Z12.39 SCREENING BREAST EXAMINATION: ICD-10-CM

## 2021-12-09 DIAGNOSIS — Z85.3 PERSONAL HISTORY OF BREAST CANCER: Primary | ICD-10-CM

## 2021-12-09 DIAGNOSIS — Z85.3 ENCOUNTER FOR FOLLOW-UP SURVEILLANCE OF BREAST CANCER: ICD-10-CM

## 2021-12-09 PROCEDURE — G0279 TOMOSYNTHESIS, MAMMO: HCPCS

## 2021-12-09 PROCEDURE — 77067 SCR MAMMO BI INCL CAD: CPT

## 2021-12-09 PROCEDURE — 99213 OFFICE O/P EST LOW 20 MIN: CPT | Performed by: SURGERY

## 2021-12-09 RX ORDER — AMPICILLIN TRIHYDRATE 250 MG
CAPSULE ORAL
COMMUNITY

## 2021-12-14 ENCOUNTER — APPOINTMENT (OUTPATIENT)
Dept: CT IMAGING | Age: 66
End: 2021-12-14
Payer: COMMERCIAL

## 2021-12-14 ENCOUNTER — APPOINTMENT (OUTPATIENT)
Dept: GENERAL RADIOLOGY | Age: 66
End: 2021-12-14
Payer: COMMERCIAL

## 2021-12-14 ENCOUNTER — HOSPITAL ENCOUNTER (EMERGENCY)
Age: 66
Discharge: HOME | End: 2021-12-14
Payer: COMMERCIAL

## 2021-12-14 VITALS
OXYGEN SATURATION: 97 % | HEART RATE: 84 BPM | RESPIRATION RATE: 16 BRPM | SYSTOLIC BLOOD PRESSURE: 124 MMHG | TEMPERATURE: 98.9 F | DIASTOLIC BLOOD PRESSURE: 77 MMHG

## 2021-12-14 DIAGNOSIS — S06.9X9A HEAD INJURY, CLOSED, WITH LOC OF UNKNOWN DURATION (HCC): Primary | ICD-10-CM

## 2021-12-14 DIAGNOSIS — Z23 TETANUS TOXOID VACCINATION ADMINISTERED AT CURRENT VISIT: ICD-10-CM

## 2021-12-14 DIAGNOSIS — S00.31XA NASAL ABRASION, INITIAL ENCOUNTER: ICD-10-CM

## 2021-12-14 DIAGNOSIS — S00.83XA FACIAL CONTUSION, INITIAL ENCOUNTER: ICD-10-CM

## 2021-12-14 PROCEDURE — 99284 EMERGENCY DEPT VISIT MOD MDM: CPT

## 2021-12-14 PROCEDURE — 6370000000 HC RX 637 (ALT 250 FOR IP): Performed by: PHYSICIAN ASSISTANT

## 2021-12-14 PROCEDURE — 70450 CT HEAD/BRAIN W/O DYE: CPT

## 2021-12-14 PROCEDURE — 72125 CT NECK SPINE W/O DYE: CPT

## 2021-12-14 PROCEDURE — 90471 IMMUNIZATION ADMIN: CPT | Performed by: PHYSICIAN ASSISTANT

## 2021-12-14 PROCEDURE — 70486 CT MAXILLOFACIAL W/O DYE: CPT

## 2021-12-14 PROCEDURE — 71045 X-RAY EXAM CHEST 1 VIEW: CPT

## 2021-12-14 PROCEDURE — 6360000002 HC RX W HCPCS: Performed by: PHYSICIAN ASSISTANT

## 2021-12-14 PROCEDURE — 72128 CT CHEST SPINE W/O DYE: CPT

## 2021-12-14 PROCEDURE — 90715 TDAP VACCINE 7 YRS/> IM: CPT | Performed by: PHYSICIAN ASSISTANT

## 2021-12-14 PROCEDURE — 73562 X-RAY EXAM OF KNEE 3: CPT

## 2021-12-14 RX ORDER — HYDROCODONE BITARTRATE AND ACETAMINOPHEN 5; 325 MG/1; MG/1
1 TABLET ORAL ONCE
Status: COMPLETED | OUTPATIENT
Start: 2021-12-14 | End: 2021-12-14

## 2021-12-14 RX ORDER — ONDANSETRON 4 MG/1
4 TABLET, ORALLY DISINTEGRATING ORAL ONCE
Status: COMPLETED | OUTPATIENT
Start: 2021-12-14 | End: 2021-12-14

## 2021-12-14 RX ADMIN — TETANUS TOXOID, REDUCED DIPHTHERIA TOXOID AND ACELLULAR PERTUSSIS VACCINE, ADSORBED 0.5 ML: 5; 2.5; 8; 8; 2.5 SUSPENSION INTRAMUSCULAR at 18:48

## 2021-12-14 RX ADMIN — HYDROCODONE BITARTRATE AND ACETAMINOPHEN 1 TABLET: 5; 325 TABLET ORAL at 16:32

## 2021-12-14 RX ADMIN — ONDANSETRON 4 MG: 4 TABLET, ORALLY DISINTEGRATING ORAL at 16:32

## 2021-12-14 ASSESSMENT — ENCOUNTER SYMPTOMS
RHINORRHEA: 0
TROUBLE SWALLOWING: 0
COUGH: 0
CHEST TIGHTNESS: 0
NAUSEA: 0
FACIAL SWELLING: 1
COLOR CHANGE: 0
SORE THROAT: 0
BACK PAIN: 1
RESPIRATORY NEGATIVE: 1
CONSTIPATION: 0
ABDOMINAL PAIN: 0
VOMITING: 0
SHORTNESS OF BREATH: 0
VOICE CHANGE: 0
SINUS PRESSURE: 0
PHOTOPHOBIA: 0
SINUS PAIN: 0
DIARRHEA: 0

## 2021-12-14 ASSESSMENT — PAIN DESCRIPTION - PAIN TYPE: TYPE: ACUTE PAIN

## 2021-12-14 ASSESSMENT — PAIN SCALES - GENERAL
PAINLEVEL_OUTOF10: 9
PAINLEVEL_OUTOF10: 9

## 2021-12-14 ASSESSMENT — PAIN DESCRIPTION - LOCATION: LOCATION: HEAD
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
## 2021-12-14 NOTE — ED NOTES
Bed: 12  Expected date:   Expected time:   Means of arrival: [facility]  Comments:  LORI Kc RN  12/14/21 7767

## 2021-12-15 ENCOUNTER — OFFICE VISIT (OUTPATIENT)
Dept: FAMILY MEDICINE CLINIC | Age: 66
End: 2021-12-15
Payer: COMMERCIAL

## 2021-12-15 VITALS
TEMPERATURE: 98 F | OXYGEN SATURATION: 97 % | WEIGHT: 179 LBS | SYSTOLIC BLOOD PRESSURE: 102 MMHG | DIASTOLIC BLOOD PRESSURE: 54 MMHG | BODY MASS INDEX: 34.96 KG/M2 | HEART RATE: 91 BPM

## 2021-12-15 DIAGNOSIS — S06.0X1A CONCUSSION WITH LOSS OF CONSCIOUSNESS OF 30 MINUTES OR LESS, INITIAL ENCOUNTER: Primary | ICD-10-CM

## 2021-12-15 DIAGNOSIS — S80.01XA CONTUSION OF RIGHT KNEE, INITIAL ENCOUNTER: ICD-10-CM

## 2021-12-15 DIAGNOSIS — S16.1XXA CERVICAL STRAIN, ACUTE, INITIAL ENCOUNTER: ICD-10-CM

## 2021-12-15 DIAGNOSIS — S00.83XA CONTUSION OF FACE, INITIAL ENCOUNTER: ICD-10-CM

## 2021-12-15 PROCEDURE — 99214 OFFICE O/P EST MOD 30 MIN: CPT | Performed by: FAMILY MEDICINE
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
## 2021-12-15 NOTE — LETTER
[address] [address]  Phone: [phone]  Fax: [phone]    Tawana Cordon MD        December 15, 2021     Patient: Domingo Orozco   YOB: 1955   Date of Visit: 12/15/2021       To Whom It May Concern: It is my medical opinion that Higinio Moss should remain out of work until 12-. If you have any questions or concerns, please don't hesitate to call.     Sincerely,          Tawana Cordon MD

## 2021-12-15 NOTE — PATIENT INSTRUCTIONS
Patient Education        Concussion: Care Instructions  Your Care Instructions     A concussion is a kind of injury to the brain. It happens when the head receives a hard blow. The impact can jar or shake the brain against the skull. This interrupts the brain's normal activities. Although you may have cuts or bruises on your head or face, you may have no other visible signs of a brain injury. In most cases, damage to the brain from a concussion can't be seen in tests such as a CT or MRI scan. For a few weeks, you may have low energy, dizziness, trouble sleeping, a headache, ringing in your ears, or nausea. You may also feel anxious, grumpy, or depressed. You may have problems with memory and concentration. These symptoms are common after a concussion. They should slowly improve over time. Sometimes this takes weeks or even months. Someone who lives with you should know how to care for you. Please share this and all information with a caregiver who will be available to help if needed. Follow-up care is a key part of your treatment and safety. Be sure to make and go to all appointments, and call your doctor if you are having problems. It's also a good idea to know your test results and keep a list of the medicines you take. How can you care for yourself at home? Pain control  · Put ice or a cold pack on the part of your head that hurts for 10 to 20 minutes at a time. Put a thin cloth between the ice and your skin. · Be safe with medicines. Read and follow all instructions on the label. ? If the doctor gave you a prescription medicine for pain, take it as prescribed. ? If you are not taking a prescription pain medicine, ask your doctor if you can take an over-the-counter medicine. Recovery  · Follow your doctor's instructions. He or she will tell you if you need someone to watch you closely for the next 24 hours or longer. · Rest is the best way to recover from a concussion.  You need to rest your body and with falls or car accidents or during certain sports. Everyday activities like working on a computer or sleeping can also cause neck strain if they force you to hold your neck in an awkward position for a long time. It is common for neck pain to get worse for a day or two after an injury, but it should start to feel better after that. You may have more pain and stiffness for several days before it gets better. This is expected. It may take a few weeks or longer for it to heal completely. Good home treatment can help you get better faster and avoid future neck problems. Follow-up care is a key part of your treatment and safety. Be sure to make and go to all appointments, and call your doctor if you are having problems. It's also a good idea to know your test results and keep a list of the medicines you take. How can you care for yourself at home? · If you were given a neck brace (cervical collar) to limit neck motion, wear it as instructed for as many days as your doctor tells you to. Do not wear it longer than you were told to. Wearing a brace for too long can make neck stiffness worse and weaken the neck muscles. · You can try using heat or ice to see if it helps. ? Try using a heating pad on a low or medium setting for 15 to 20 minutes every 2 to 3 hours. Try a warm shower in place of one session with the heating pad. You can also buy single-use heat wraps that last up to 8 hours. ? You can also try an ice pack for 10 to 15 minutes every 2 to 3 hours. · Take pain medicines exactly as directed. ? If the doctor gave you a prescription medicine for pain, take it as prescribed. ? If you are not taking a prescription pain medicine, ask your doctor if you can take an over-the-counter medicine. · Gently rub the area to relieve pain and help with blood flow. Do not massage the area if it hurts to do so. · Do not do anything that makes the pain worse. Take it easy for a couple of days.  You can do your usual activities if they do not hurt your neck or put it at risk for more stress or injury. · Try sleeping on a special neck pillow. Place it under your neck, not under your head. Placing a tightly rolled-up towel under your neck while you sleep will also work. If you use a neck pillow or rolled towel, do not use your regular pillow at the same time. · To prevent future neck pain, do exercises to stretch and strengthen your neck and back. Learn how to use good posture, safe lifting techniques, and proper body mechanics. When should you call for help? Call 911 anytime you think you may need emergency care. For example, call if:    · You are unable to move an arm or a leg at all. Call your doctor now or seek immediate medical care if:    · You have new or worse symptoms in your arms, legs, chest, belly, or buttocks. Symptoms may include:  ? Numbness or tingling. ? Weakness. ? Pain.     · You lose bladder or bowel control. Watch closely for changes in your health, and be sure to contact your doctor if:    · You are not getting better as expected. Where can you learn more? Go to https://Creditable.P[employer]Spot. org and sign in to your [employer] account. Enter M253 in the SubHub box to learn more about \"Neck Strain: Care Instructions. \"     If you do not have an account, please click on the \"Sign Up Now\" link. Current as of: July 1, 2021               Content Version: 13.0  © 2006-2021 [employer]wise[employer]orporated. Care instructions adapted under license by [facility] ([facility]). If you have questions about a medical condition or this instruction, always ask your healthcare professional. [place] [address] any warranty or liability for your use of this information.

## 2021-12-28 ENCOUNTER — OFFICE VISIT (OUTPATIENT)
Dept: FAMILY MEDICINE CLINIC | Age: 66
End: 2021-12-28
Payer: COMMERCIAL

## 2021-12-28 VITALS
BODY MASS INDEX: 35.15 KG/M2 | WEIGHT: 180 LBS | DIASTOLIC BLOOD PRESSURE: 70 MMHG | HEART RATE: 70 BPM | TEMPERATURE: 97.5 F | OXYGEN SATURATION: 95 % | SYSTOLIC BLOOD PRESSURE: 120 MMHG

## 2021-12-28 DIAGNOSIS — M17.0 PRIMARY OSTEOARTHRITIS OF BOTH KNEES: ICD-10-CM

## 2021-12-28 DIAGNOSIS — M54.50 CHRONIC MIDLINE LOW BACK PAIN WITHOUT SCIATICA: ICD-10-CM

## 2021-12-28 DIAGNOSIS — G89.29 CHRONIC MIDLINE LOW BACK PAIN WITHOUT SCIATICA: ICD-10-CM

## 2021-12-28 PROCEDURE — 99213 OFFICE O/P EST LOW 20 MIN: CPT | Performed by: NURSE PRACTITIONER

## 2021-12-28 RX ORDER — TRAMADOL HYDROCHLORIDE 50 MG/1
50 TABLET ORAL EVERY 8 HOURS PRN
Qty: 90 TABLET | Refills: 0 | Status: SHIPPED | OUTPATIENT
Start: 2021-12-28 | End: 2022-02-14 | Stop reason: SDUPTHER

## 2021-12-28 NOTE — TELEPHONE ENCOUNTER
Medication:   Requested Prescriptions     Pending Prescriptions Disp Refills    metFORMIN (GLUCOPHAGE) 500 MG tablet 180 tablet 1     Sig: Take 1 tablet by mouth 2 times daily (with meals)        Last Filled: 3/16/2021      Patient Phone Number: [phone] (home) [phone] (work)    Last appt: 12/28/2021   Next appt: Visit date not found    Last OARRS:   RX Monitoring 2/7/2020   Attestation -   Acute Pain Prescriptions -   Periodic Controlled Substance Monitoring No signs of potential drug abuse or diversion identified.    Chronic Pain > 80 MEDD -

## 2021-12-28 NOTE — PROGRESS NOTES
Aspirin Shortness Of Breath    Ibuprofen Shortness Of Breath    Zithromax [Azithromycin Dihydrate] Nausea And Vomiting and Other (See Comments)     Stomach cramping    Lisinopril Other (See Comments)     Angioedema, 2018    Xanax [Alprazolam] Other (See Comments)     Slurred speech  Patient felt \"out of control\"     Past Medical History:   Diagnosis Date    Anxiety and depression     on celexa WELL CONTROLLED AS PER PT 4-17    Arthritis     feet and mid to lower back    [facility]     Cancer ([facility] 2007    uterine, right breast    Diabetes mellitus ([facility]     GERD (gastroesophageal reflux disease)     Hyperlipidemia     Hypertension     Overactive bladder     Sleep apnea     USES CPAP    Type II or unspecified type diabetes mellitus without mention of complication, not stated as uncontrolled     Wears glasses       Past Surgical History:   Procedure Laterality Date    ABDOMEN SURGERY      at age 10 mos--remove tissue    BREAST BIOPSY      BREAST LUMPECTOMY      BREAST SURGERY Right     cyst removed    CARPAL TUNNEL RELEASE Right 2017    CARPAL TUNNEL RELEASE Left 2017    Left  Carpal Tunnel Release & Left Ulnar Nerve decompression at the Cubital Tunnel      SECTION      COLONOSCOPY      COLONOSCOPY  10/16/2020    COLONOSCOPY POLYPECTOMY SNARE/COLD BIOPSY performed by Razia Damico MD at [address] [place] [address] Right 2017    Index Finger    HYSTERECTOMY  2007    d/t uterine cancer    KNEE ARTHROSCOPY Right 2017    RIGHT KNEE ARTHROSCOPY, PARTIAL MEDIAL MENISCECTOMY    MASTECTOMY, MODIFIED RADICAL Right 2019    RIGHT LOCALIZED PARTIAL BREAST MASTECTOMY,  RIGHT AXILLARY LYMPH NODE DISSECTION, BIOZORB performed by Breezy Varghese MD at [address] [place] [address] Right     TONSILLECTOMY      ULNAR TUNNEL RELEASE Right 2017    UPPER GASTROINTESTINAL ENDOSCOPY      UPPER GASTROINTESTINAL ENDOSCOPY N/A 10/16/2020    EGD BIOPSY performed by Obie Small MD at [address]      Family History   Problem Relation Age of Onset    COPD Mother     Heart Disease Father     Cancer Other     Diabetes Other     Heart Disease Other     High Blood Pressure Other     Seizures Other     Diabetes Sister     Sleep Apnea Sister     Sleep Apnea Sister     Sleep Apnea Son       Social History     Tobacco Use    Smoking status: Former Smoker     Packs/day: 0.25     Years: 10.00     Pack years: 2.50     Types: Cigarettes     Quit date: 1995     Years since quittin.7    Smokeless tobacco: Never Used   Substance Use Topics    Alcohol use: No     Alcohol/week: 0.0 standard drinks        Review of Systems    Objective:    /70 (Site: Left Upper Arm, Position: Sitting, Cuff Size: Large Adult)   Pulse 70   Temp 97.5 °F (36.4 °C)   Wt 180 lb (81.6 kg)   SpO2 95%   BMI 35.15 kg/m²   Weight: 180 lb (81.6 kg)     BP Readings from Last 3 Encounters:   21 120/70   12/15/21 (!) 102/54   21 124/77     Wt Readings from Last 3 Encounters:   21 180 lb (81.6 kg)   12/15/21 179 lb (81.2 kg)   21 180 lb (81.6 kg)     BMI Readings from Last 3 Encounters:   21 35.15 kg/m²   12/15/21 34.96 kg/m²   21 35.15 kg/m²       Physical Exam  Vitals reviewed. Constitutional:       Appearance: Normal appearance. She is well-developed. Cardiovascular:      Rate and Rhythm: Normal rate and regular rhythm. Pulses: Normal pulses. Heart sounds: Normal heart sounds. No murmur heard. Pulmonary:      Effort: Pulmonary effort is normal.      Breath sounds: Normal breath sounds. Musculoskeletal:         General: Tenderness (R knee, back, full ROM) and signs of injury present. Normal range of motion. Skin:     General: Skin is warm and dry. Findings: Bruising (facial) present. Neurological:      Mental Status: She is alert and oriented to person, place, and time. Psychiatric:         Mood and Affect: Mood normal.         Assessment/Plan    1. Primary osteoarthritis of both knees  Controlled  OARRS reviewed  - traMADol (ULTRAM) 50 MG tablet; Take 1 tablet by mouth every 8 hours as needed for Pain for up to 30 days. Dispense: 90 tablet; Refill: 0    2. Chronic midline low back pain without sciatica  - traMADol (ULTRAM) 50 MG tablet; Take 1 tablet by mouth every 8 hours as needed for Pain for up to 30 days. Dispense: 90 tablet; Refill: 0      Return in about 3 months (around 3/28/2022) for medication re-check. This dictation was generated by voice recognition computer software. Although all attempts are made to edit the dictation for accuracy, there may be errors in the transcription that are not intended.

## 2021-12-28 NOTE — TELEPHONE ENCOUNTER
Patient called and stated that we forgot to send in her metFORMIN (GLUCOPHAGE) 500 MG tablet      Pharmacy is confirmed correct in patients chart as [facility]

## 2022-01-06 ENCOUNTER — VIRTUAL VISIT (OUTPATIENT)
Dept: PULMONOLOGY | Age: 67
End: 2022-01-06
Payer: COMMERCIAL

## 2022-01-06 DIAGNOSIS — E66.9 NON MORBID OBESITY, UNSPECIFIED OBESITY TYPE: Chronic | ICD-10-CM

## 2022-01-06 DIAGNOSIS — K21.00 GASTROESOPHAGEAL REFLUX DISEASE WITH ESOPHAGITIS, UNSPECIFIED WHETHER HEMORRHAGE: Chronic | ICD-10-CM

## 2022-01-06 DIAGNOSIS — E11.9 TYPE 2 DIABETES MELLITUS WITHOUT COMPLICATION, WITHOUT LONG-TERM CURRENT USE OF INSULIN (HCC): Chronic | ICD-10-CM

## 2022-01-06 DIAGNOSIS — F32.5 MAJOR DEPRESSIVE DISORDER WITH SINGLE EPISODE, IN FULL REMISSION (HCC): Chronic | ICD-10-CM

## 2022-01-06 DIAGNOSIS — G47.33 OBSTRUCTIVE SLEEP APNEA SYNDROME: Chronic | ICD-10-CM

## 2022-01-06 PROCEDURE — 99214 OFFICE O/P EST MOD 30 MIN: CPT | Performed by: INTERNAL MEDICINE

## 2022-01-06 ASSESSMENT — SLEEP AND FATIGUE QUESTIONNAIRES
HOW LIKELY ARE YOU TO NOD OFF OR FALL ASLEEP WHEN YOU ARE A PASSENGER IN A CAR FOR AN HOUR WITHOUT A BREAK: 2
HOW LIKELY ARE YOU TO NOD OFF OR FALL ASLEEP WHILE SITTING QUIETLY AFTER LUNCH WITHOUT ALCOHOL: 3
HOW LIKELY ARE YOU TO NOD OFF OR FALL ASLEEP IN A CAR, WHILE STOPPED FOR A FEW MINUTES IN TRAFFIC: 1
HOW LIKELY ARE YOU TO NOD OFF OR FALL ASLEEP WHILE SITTING INACTIVE IN A PUBLIC PLACE: 1
HOW LIKELY ARE YOU TO NOD OFF OR FALL ASLEEP WHILE WATCHING TV: 3
HOW LIKELY ARE YOU TO NOD OFF OR FALL ASLEEP WHILE LYING DOWN TO REST IN THE AFTERNOON WHEN CIRCUMSTANCES PERMIT: 2
HOW LIKELY ARE YOU TO NOD OFF OR FALL ASLEEP WHILE SITTING AND READING: 3
HOW LIKELY ARE YOU TO NOD OFF OR FALL ASLEEP WHILE SITTING AND TALKING TO SOMEONE: 0
ESS TOTAL SCORE: 15

## 2022-01-06 NOTE — LETTER
[facility]  [address] [place] [address]  Phone: [phone]  Fax: [phone]    Farhat Nolasco MD    January 6, 2022     Arvind Clemens, APRN - NP  [address] [facility] [address]    Patient: Aditya Ogden   MR Number: [id number]   YOB: 1955   Date of Visit: 1/6/2022       Dear Arvind Clemens:    Thank you for referring Alexander Holcomb to me for evaluation/treatment. Below are the relevant portions of my assessment and plan of care. 1. Obstructive sleep apnea syndrome  Assessment & Plan:  New Problem - On Tx. Reviewed sleep study and download compliance data with patient. Supplies and parts as needed for her machine. These are medically necessary. Limit caffeine use after 3pm.  At this point the patient has failed CPAP and would benefit to change to bilevel. She is getting ready to move to [facility] this weekend so would like to hold off and wait to see if she can establish with a physician down there. Needs overnight oximetry on APAP (insurance mandated) even though standard of care is to do in-lab titration. Since the patient is moving she like to hold off on the oximetry at this time as well. 2. Type 2 diabetes mellitus without complication, without long-term current use of insulin ([facility])  Assessment & Plan:  Chronic- Stable. Discussed the importance of treating obstructive sleep apnea as part of the management of this disorder. Cont any meds per PCP and other physicians. 3. Gastroesophageal reflux disease with esophagitis, unspecified whether hemorrhage  Assessment & Plan:  Chronic- Stable. Discussed the importance of treating obstructive sleep apnea as part of the management of this disorder. Cont any meds per PCP and other physicians. 4. Major depressive disorder with single episode, in full remission [facility])  Assessment & Plan:  Chronic- Stable.   Discussed the importance of treating obstructive sleep apnea as part of the management of this disorder. Cont any meds per PCP and other physicians. 5. Non morbid obesity, unspecified obesity type  Assessment & Plan:  Chronic-not stable:  Discussed importance of treating obstructive sleep apnea and getting sufficient sleep to assist with weight control. Encouraged her to work on weight loss through diet and exercise. Recommended DASH or Mediterranean diets. Reviewed, analyzed, and documented physiologic data from patient's PAP machine. This information was analyzed to assess complexity and medical decision making in regards to further testing and management. Diagnoses of Obstructive sleep apnea syndrome, Type 2 diabetes mellitus without complication, without long-term current use of insulin ([facility] Gastroesophageal reflux disease with esophagitis, unspecified whether hemorrhage, Major depressive disorder with single episode, in full remission ([facility] and Non morbid obesity, unspecified obesity type were pertinent to this visit. The chronic medical conditions listed are directly related to the primary diagnosis listed above. The management of the primary diagnosis affects the secondary diagnosis and vice versa. If you have questions, please do not hesitate to call me. I look forward to following Suha Bonds along with you.     Sincerely,      Rudy Solano MD

## 2022-01-06 NOTE — ASSESSMENT & PLAN NOTE
New Problem - On Tx. Reviewed sleep study and download compliance data with patient. Supplies and parts as needed for her machine. These are medically necessary. Limit caffeine use after 3pm.  At this point the patient has failed CPAP and would benefit to change to bilevel. She is getting ready to move to [facility] this weekend so would like to hold off and wait to see if she can establish with a physician down there. Needs overnight oximetry on APAP (insurance mandated) even though standard of care is to do in-lab titration. Since the patient is moving she like to hold off on the oximetry at this time as well.

## 2022-01-06 NOTE — PROGRESS NOTES
Cynthia Villarreal MD, [facility], [facility]  [address]  [address] [facility], [facility] Jennifer Mason E [phone]   [facility] [address] Octavia Oliver. [address] [place] [address]. Kayley Levin 37 [phone]     Video Visit- Follow up    Pursuant to the emergency declaration under the [address] [place] [address], [facility] waiver authority and the [employer] Act, this Virtual  Visit was conducted, with patient's consent, to reduce the patient's risk of exposure to COVID-19 and provide continuity of care for an established patient. Services were provided through a video synchronous discussion virtually to substitute for in-person clinic visit. Patient was located in their home. Assessment/Plan:      1. Obstructive sleep apnea syndrome  Assessment & Plan:  New Problem - On Tx. Reviewed sleep study and download compliance data with patient. Supplies and parts as needed for her machine. These are medically necessary. Limit caffeine use after 3pm.  At this point the patient has failed CPAP and would benefit to change to bilevel. She is getting ready to move to [facility] this weekend so would like to hold off and wait to see if she can establish with a physician down there. Needs overnight oximetry on APAP (insurance mandated) even though standard of care is to do in-lab titration. Since the patient is moving she like to hold off on the oximetry at this time as well. 2. Type 2 diabetes mellitus without complication, without long-term current use of insulin ([facility])  Assessment & Plan:  Chronic- Stable. Discussed the importance of treating obstructive sleep apnea as part of the management of this disorder. Cont any meds per PCP and other physicians. 3. Gastroesophageal reflux disease with esophagitis, unspecified whether hemorrhage  Assessment & Plan:  Chronic- Stable.   Discussed the importance of treating obstructive sleep apnea as part of the management of this disorder. Cont any meds per PCP and other physicians. 4. Major depressive disorder with single episode, in full remission [facility]  Assessment & Plan:  Chronic- Stable. Discussed the importance of treating obstructive sleep apnea as part of the management of this disorder. Cont any meds per PCP and other physicians. 5. Non morbid obesity, unspecified obesity type  Assessment & Plan:  Chronic-not stable:  Discussed importance of treating obstructive sleep apnea and getting sufficient sleep to assist with weight control. Encouraged her to work on weight loss through diet and exercise. Recommended DASH or Mediterranean diets. Reviewed, analyzed, and documented physiologic data from patient's PAP machine. This information was analyzed to assess complexity and medical decision making in regards to further testing and management. Diagnoses of Obstructive sleep apnea syndrome, Type 2 diabetes mellitus without complication, without long-term current use of insulin ([facility] Gastroesophageal reflux disease with esophagitis, unspecified whether hemorrhage, Major depressive disorder with single episode, in full remission ([facility] and Non morbid obesity, unspecified obesity type were pertinent to this visit. The chronic medical conditions listed are directly related to the primary diagnosis listed above. The management of the primary diagnosis affects the secondary diagnosis and vice versa. Subjective:     Patient ID: Misti Castro is a 61 y.o. female.     Chief Complaint   Patient presents with    Sleep Apnea     Subjective   HPI:    Machine Modem/Download Info:  Compliance (hours/night): 5 hrs/night  % of nights >= 4 hrs: 76 %  Download AHI (/hour): 2.2 /HR  Average CPAP Pressure : 12.8 cmH2O   APAP - Settings  Pressure Min: 7 cmH2O  Pressure Max: 17 cmH2O   Comfort Settings  Flex/EPR (0-3): 3       Had insurance mandated HST on 10/6/21 which showed and MAIRA-17/hr and low sat-76% with time below 88% of 144.7 min. Feels much better with the use of her machine, waking more rested. She is struggling with some headaches but had a recent fall and thinks that is due to her injury. Having issues with some mask leak and feels the pressure gets high and leaks on her. She has tried a new mask but still having leak issues. [address]    [place] - Total score: 15    Current Outpatient Medications   Medication Instructions    Calcium Carbonate-Vit D-Min (CALCIUM 1200 PO) Oral    Cholecalciferol (VITAMIN D3) 2000 units CAPS Oral    Cinnamon 500 MG CAPS Oral    citalopram (CELEXA) 10 MG tablet TAKE ONE TABLET BY MOUTH DAILY    CPAP Machine MISC Does not apply    Diclofenac Sodium 1.5 % SOLN APPLY ONE SQUIRT TOPICALLY TWICE A DAY    glucose blood VI test strips ([employer] TEST STRIPS) strip 1 each, In Vitro, DAILY, As needed.      loratadine (CLARITIN) 10 mg, Oral, DAILY    losartan (COZAAR) 50 MG tablet TAKE ONE TABLET BY MOUTH DAILY    metFORMIN (GLUCOPHAGE) 500 mg, Oral, 2 TIMES DAILY WITH MEALS    Multiple Vitamins-Minerals (MULTIVITAMIN ADULT PO) Oral    omeprazole (PRILOSEC) 40 MG delayed release capsule TAKE ONE CAPSULE BY MOUTH DAILY    oxybutynin (DITROPAN) 5 mg, Oral, 2 TIMES DAILY    pravastatin (PRAVACHOL) 20 MG tablet TAKE ONE TABLET BY MOUTH DAILY    traMADol (ULTRAM) 50 mg, Oral, EVERY 8 HOURS PRN        Electronically signed by Edwin Mendoza MD on 1/6/2022 at 10:30 AM

## 2022-02-01 ENCOUNTER — HOSPITAL ENCOUNTER (OUTPATIENT)
Dept: MRI IMAGING | Age: 67
Discharge: HOME | End: 2022-02-01
Payer: COMMERCIAL

## 2022-02-01 DIAGNOSIS — S80.01XA CONTUSION OF RIGHT KNEE, INITIAL ENCOUNTER: ICD-10-CM

## 2022-02-01 DIAGNOSIS — S83.91XA SPRAIN OF UNSPECIFIED SITE OF RIGHT KNEE, INITIAL ENCOUNTER: ICD-10-CM

## 2022-02-01 PROCEDURE — 73721 MRI JNT OF LWR EXTRE W/O DYE: CPT

## 2022-02-14 DIAGNOSIS — M17.0 PRIMARY OSTEOARTHRITIS OF BOTH KNEES: ICD-10-CM

## 2022-02-14 DIAGNOSIS — E78.00 PURE HYPERCHOLESTEROLEMIA: ICD-10-CM

## 2022-02-14 DIAGNOSIS — F32.5 MAJOR DEPRESSIVE DISORDER WITH SINGLE EPISODE, IN FULL REMISSION (HCC): ICD-10-CM

## 2022-02-14 DIAGNOSIS — G89.29 CHRONIC MIDLINE LOW BACK PAIN WITHOUT SCIATICA: ICD-10-CM

## 2022-02-14 DIAGNOSIS — M54.50 CHRONIC MIDLINE LOW BACK PAIN WITHOUT SCIATICA: ICD-10-CM

## 2022-02-14 RX ORDER — METFORMIN HYDROCHLORIDE 500 MG/1
500 TABLET, EXTENDED RELEASE ORAL 2 TIMES DAILY
Qty: 180 TABLET | Refills: 3 | Status: SHIPPED | OUTPATIENT
Start: 2022-02-14

## 2022-02-14 RX ORDER — PRAVASTATIN SODIUM 20 MG
TABLET ORAL
Qty: 90 TABLET | Refills: 0 | Status: SHIPPED | OUTPATIENT
Start: 2022-02-14 | End: 2022-05-16

## 2022-02-14 RX ORDER — TRAMADOL HYDROCHLORIDE 50 MG/1
50 TABLET ORAL EVERY 8 HOURS PRN
Qty: 90 TABLET | Refills: 0 | Status: SHIPPED | OUTPATIENT
Start: 2022-02-14 | End: 2022-03-16

## 2022-02-14 RX ORDER — CITALOPRAM 10 MG/1
TABLET ORAL
Qty: 90 TABLET | Refills: 3 | Status: SHIPPED | OUTPATIENT
Start: 2022-02-14

## 2022-02-14 NOTE — TELEPHONE ENCOUNTER
Patient called into office requesting refills on her medication- she is completely out. She recently moved and is still working on finding a new PCP. Patient also states that for her last fill of metformin was for the regular tablets and not for the extended release. She states that she has noticed an increase in her blood sugars since being on the regular metformin and is wondering if she should go back to the ER- of so would need a refill on that as well. Please advise. Medication:   Requested Prescriptions     Pending Prescriptions Disp Refills    traMADol (ULTRAM) 50 MG tablet 90 tablet 0     Sig: Take 1 tablet by mouth every 8 hours as needed for Pain for up to 30 days.  pravastatin (PRAVACHOL) 20 MG tablet 90 tablet 0     Sig: TAKE ONE TABLET BY MOUTH DAILY    citalopram (CELEXA) 10 MG tablet 90 tablet 3     Sig: TAKE ONE TABLET BY MOUTH DAILY      Last Filled: 12/28/2021    Patient Phone Number: [phone] (home) [phone] (work)    Last appt: 12/28/2021   Next appt: Visit date not found    Last OARRS:   RX Monitoring 2/7/2020   Attestation -   Acute Pain Prescriptions -   Periodic Controlled Substance Monitoring No signs of potential drug abuse or diversion identified.    Chronic Pain > 80 MEDD -     PDMP Monitoring:    Last PDMP Luh Tellez as Reviewed [facility]):  Review User Review Instant Review Result   Ronnell Carter 9/30/2021  2:10 PM Reviewed PDMP [1]     Preferred Pharmacy:       [facility] [address], [address] [address]  Phone: [phone] Fax: [phone]

## 2022-02-16 ENCOUNTER — TELEPHONE (OUTPATIENT)
Dept: FAMILY MEDICINE CLINIC | Age: 67
End: 2022-02-16

## 2022-02-16 NOTE — TELEPHONE ENCOUNTER
Please start PA on:     traMADol (ULTRAM) 50 MG tablet        Dx: M54.50, G89.29    Cover my meds key: [id number]

## 2022-02-16 NOTE — TELEPHONE ENCOUNTER
Spoke to the patient states she already received her prescription from the pharmacy. Pt states pharmacy had some trouble with her insurance but everything is fixed now.

## 2022-02-16 NOTE — TELEPHONE ENCOUNTER
Tried submitting PA for traMADol HCl 50MG tablets. [employer] does not manage prior authorizations for this patient. Please resubmit the ePA request to Faisal Benavides. I do not see any [employer] information on file for this patient. Please reach out to patient for NEW PRESCRIPTION coverage.      Thank you

## 2022-04-26 DIAGNOSIS — M54.50 CHRONIC MIDLINE LOW BACK PAIN WITHOUT SCIATICA: ICD-10-CM

## 2022-04-26 DIAGNOSIS — G89.29 CHRONIC MIDLINE LOW BACK PAIN WITHOUT SCIATICA: ICD-10-CM

## 2022-04-26 DIAGNOSIS — M17.0 PRIMARY OSTEOARTHRITIS OF BOTH KNEES: ICD-10-CM

## 2022-04-27 ENCOUNTER — TELEPHONE (OUTPATIENT)
Dept: ADMINISTRATIVE | Age: 67
End: 2022-04-27

## 2022-04-27 RX ORDER — DICLOFENAC SODIUM 16.05 MG/ML
SOLUTION TOPICAL
Qty: 150 ML | Refills: 0 | Status: SHIPPED | OUTPATIENT
Start: 2022-04-27

## 2022-04-27 RX ORDER — LOSARTAN POTASSIUM 50 MG/1
TABLET ORAL
Qty: 30 TABLET | Refills: 1 | Status: SHIPPED | OUTPATIENT
Start: 2022-04-27

## 2022-04-27 NOTE — TELEPHONE ENCOUNTER
Submitted PA for Diclofenac Sodium 1.5% solution Via CMM Key: [id number] STATUS: DENIED. Please see denial letter attached.      \" Used Diclofenac Sodium (Voltaren) 1 % Gel within the last year\"

## 2022-04-27 NOTE — TELEPHONE ENCOUNTER
Medication:   Requested Prescriptions     Pending Prescriptions Disp Refills    Diclofenac Sodium 1.5 % SOLN [Pharmacy Med Name: DICLOFENAC 1.5% TOPICAL SOLN] 150 mL 0     Sig: APPLY ONE SQUIRT TOPICALLY TWO TIMES A DAY    losartan (COZAAR) 50 MG tablet [Pharmacy Med Name: LOSARTAN POTASSIUM 50 MG TAB] 30 tablet      Sig: TAKE ONE TABLET BY MOUTH DAILY      Last Filled:      Patient Phone Number: [phone] (home) [phone] (work)    Last appt: 12/28/2021   Next appt: Due 3/28/22 but no appt scheduled    Last OARRS:   RX Monitoring 2/7/2020   Attestation -   Acute Pain Prescriptions -   Periodic Controlled Substance Monitoring No signs of potential drug abuse or diversion identified.    Chronic Pain > 80 MEDD -     PDMP Monitoring:    Last PDMP Veena Hopkins as Reviewed [facility]):  Review User Review Instant Review Result   Vitor Led 9/30/2021  2:10 PM Reviewed PDMP [1]     Preferred Pharmacy:   [facility] [id number] [address] [place]  [place] [address]  Phone: [phone] Fax: [address], [place] [phone] Salvador Vance [phone]  [facility] [address] [place] [address] [facility]  Phone: [phone] Fax: [phone]

## 2022-05-15 DIAGNOSIS — E78.00 PURE HYPERCHOLESTEROLEMIA: ICD-10-CM

## 2022-05-16 RX ORDER — PRAVASTATIN SODIUM 20 MG
TABLET ORAL
Qty: 90 TABLET | Refills: 0 | Status: SHIPPED | OUTPATIENT
Start: 2022-05-16

## 2022-06-01 ENCOUNTER — TELEPHONE (OUTPATIENT)
Dept: PULMONOLOGY | Age: 67
End: 2022-06-01

## 2022-06-01 NOTE — TELEPHONE ENCOUNTER
Patient has moved and needs her sleep study results sent to her new doctor. Dr. Susy Elam, [phone]. Fax number is [phone].

## 2022-07-11 ENCOUNTER — TELEPHONE (OUTPATIENT)
Dept: PULMONOLOGY | Age: 67
End: 2022-07-11

## 2022-07-11 NOTE — TELEPHONE ENCOUNTER
Pt left voicemail stating she has moved and her DME needs orders for supplies. Please follow up.     Ph. [phone]

## 2022-07-13 NOTE — TELEPHONE ENCOUNTER
Called patient to see where she needs to send the order for supplies and her new address. Patient needs an appt. ?

## 2022-07-15 NOTE — TELEPHONE ENCOUNTER
Spoke with Zelalem Casas and was a little bit confuse about the message, so she will call Dr. Thierry Hilton in [facility] to see if they have all the paper work to she continue to have her supplies from [facility]. She will call us back with any news.

## 2022-07-15 NOTE — TELEPHONE ENCOUNTER
Pt called and gave me the name of her new DME company for us to send her orders to. [facility]). Please follow up.     Ph. [phone]

## 2022-07-23 DIAGNOSIS — E78.00 PURE HYPERCHOLESTEROLEMIA: ICD-10-CM

## 2022-07-25 RX ORDER — PRAVASTATIN SODIUM 20 MG
TABLET ORAL
Qty: 90 TABLET | Refills: 0 | OUTPATIENT
Start: 2022-07-25

## 2022-07-29 DIAGNOSIS — E78.00 PURE HYPERCHOLESTEROLEMIA: ICD-10-CM

## 2022-07-29 NOTE — TELEPHONE ENCOUNTER
Medication:   Requested Prescriptions     Pending Prescriptions Disp Refills    pravastatin (PRAVACHOL) 20 MG tablet [Pharmacy Med Name: PRAVASTATIN SODIUM 20 MG TAB] 90 tablet 0     Sig: TAKE ONE TABLET BY MOUTH DAILY      Provider out of office. Patient Phone Number: [phone] (home) [phone] (work)    Last appt: 12/28/2021   Next appt: Visit date not found    Last OARRS:   RX Monitoring 2/7/2020   Attestation -   Acute Pain Prescriptions -   Periodic Controlled Substance Monitoring No signs of potential drug abuse or diversion identified. Chronic Pain > 80 MEDD -     PDMP Monitoring:    Last PDMP Ettajeanette Kocher as Reviewed [facility]):  Review User Review Instant Review Result   Miles Espinoza 9/30/2021  2:10 PM Reviewed PDMP [1]     Preferred Pharmacy:   [facility] [id number] [place] [address]  Phone: [phone] Fax: [phone]    [facility] [id number] [facility] [phone] [facility] [phone]  [address] [address] [facility]  Phone: [phone] Fax: [phone]    Medication:   Requested Prescriptions     Pending Prescriptions Disp Refills    pravastatin (PRAVACHOL) 20 MG tablet [Pharmacy Med Name: PRAVASTATIN SODIUM 20 MG TAB] 90 tablet 0     Sig: TAKE ONE TABLET BY MOUTH DAILY          Patient Phone Number: [phone] (home) [phone] (work)    Last appt: 12/28/2021   Next appt: Visit date not found    Last OARRS:   RX Monitoring 2/7/2020   Attestation -   Acute Pain Prescriptions -   Periodic Controlled Substance Monitoring No signs of potential drug abuse or diversion identified.    Chronic Pain > 80 MEDD -     PDMP Monitoring:    Last PDMP Ettajeanette Kocher as Reviewed [facility]):  Review User Review Instant Review Result   Miles Espinoza 9/30/2021  2:10 PM Reviewed PDMP [1]     Preferred Pharmacy:   [facility] [id number] [facility]  [id number] [place] [address]  Phone: [phone] Fax: [phone]    [facility] [id number] - VQFACALT [address] [phone] [facility] [phone]  [address] [address]  Phone: [phone] Fax: [phone]

## 2022-07-30 RX ORDER — PRAVASTATIN SODIUM 20 MG
TABLET ORAL
Qty: 90 TABLET | Refills: 0 | OUTPATIENT
Start: 2022-07-30

## 2022-08-12 ENCOUNTER — TELEPHONE ENCOUNTER (OUTPATIENT)
Dept: URBAN - METROPOLITAN AREA CLINIC 72 | Facility: CLINIC | Age: 67
End: 2022-08-12

## 2022-08-19 ENCOUNTER — TELEPHONE (OUTPATIENT)
Dept: SURGERY | Age: 67
End: 2022-08-19

## 2022-08-19 NOTE — TELEPHONE ENCOUNTER
Received letter that the patient was due for an MRI. Called and spoke to patient. Wanted to follow up with this patient. She lives in [facility] and informed me that she had her MRI in July 2022.

## 2022-08-23 ENCOUNTER — TELEPHONE (OUTPATIENT)
Dept: PULMONOLOGY | Age: 67
End: 2022-08-23

## 2022-08-23 NOTE — TELEPHONE ENCOUNTER
Patient called and would like to have her supply ordered faxed to [facility], fax number she gave was [phone].

## 2022-08-25 NOTE — TELEPHONE ENCOUNTER
Spoke with Antwan Martínez about this message and she said she finished with this message and she sent to me just to know about it.

## 2022-08-30 NOTE — TELEPHONE ENCOUNTER
Pt left message saying we sent her order but it didn't come with an electronic file/name. Please Advise.     . [phone]

## 2022-08-31 NOTE — TELEPHONE ENCOUNTER
Spoke with pt and she said Eliza need a signature in the order, I explained to her the order has a electronically signature she said the letter she received requested it, so I will call eliza to see what is happening.

## 2022-08-31 NOTE — TELEPHONE ENCOUNTER
Samuel Corrigan from [address] [place] [address] asked if I could fax over the office notes from 9/7/21. I faxed them to the number she gave me [phone]. I scanned in the fax report success sheet.

## 2022-09-02 ENCOUNTER — OFFICE VISIT (OUTPATIENT)
Dept: URBAN - METROPOLITAN AREA CLINIC 113 | Facility: CLINIC | Age: 67
End: 2022-09-02

## 2022-09-21 ENCOUNTER — OFFICE VISIT (OUTPATIENT)
Dept: URBAN - METROPOLITAN AREA CLINIC 113 | Facility: CLINIC | Age: 67
End: 2022-09-21
Payer: MEDICARE

## 2022-09-21 VITALS
TEMPERATURE: 98.7 F | BODY MASS INDEX: 36.91 KG/M2 | DIASTOLIC BLOOD PRESSURE: 79 MMHG | HEIGHT: 60 IN | WEIGHT: 188 LBS | SYSTOLIC BLOOD PRESSURE: 117 MMHG | HEART RATE: 87 BPM

## 2022-09-21 DIAGNOSIS — K21.9 GASTROESOPHAGEAL REFLUX DISEASE, UNSPECIFIED WHETHER ESOPHAGITIS PRESENT: ICD-10-CM

## 2022-09-21 PROCEDURE — 99203 OFFICE O/P NEW LOW 30 MIN: CPT | Performed by: STUDENT IN AN ORGANIZED HEALTH CARE EDUCATION/TRAINING PROGRAM

## 2022-09-21 RX ORDER — TRAMADOL HYDROCHLORIDE 50 MG/1
1 TABLET AS NEEDED TABLET, FILM COATED ORAL
Status: ACTIVE | COMMUNITY

## 2022-09-21 RX ORDER — LORATADINE 10 MG/1
1 TABLET TABLET ORAL ONCE A DAY
Status: ACTIVE | COMMUNITY

## 2022-09-21 RX ORDER — DULAGLUTIDE 0.75 MG/.5ML
AS DIRECTED INJECTION, SOLUTION SUBCUTANEOUS
Status: ACTIVE | COMMUNITY

## 2022-09-21 RX ORDER — CHOLECALCIFEROL (VITAMIN D3) 50 MCG
1 CAPSULE TABLET ORAL ONCE A DAY
Status: ACTIVE | COMMUNITY

## 2022-09-21 RX ORDER — ASCORBIC ACID/MULTIVIT-MIN 1000 MG
AS DIRECTED EFFERVESCENT POWDER IN PACKET ORAL
Status: ACTIVE | COMMUNITY

## 2022-09-21 RX ORDER — PRAVASTATIN SODIUM 20 MG/1
1 TABLET TABLET ORAL ONCE A DAY
Status: ACTIVE | COMMUNITY

## 2022-09-21 RX ORDER — METFORMIN HYDROCHLORIDE 500 MG/1
1 TABLET WITH A MEAL TABLET, FILM COATED ORAL TWICE DAILY
Status: ACTIVE | COMMUNITY

## 2022-09-21 RX ORDER — MULTIVIT-MIN/IRON/FOLIC ACID/K 18-600-40
AS DIRECTED CAPSULE ORAL
Status: ACTIVE | COMMUNITY

## 2022-09-21 RX ORDER — OMEPRAZOLE 40 MG/1
1 CAPSULE 30 MINUTES BEFORE MORNING MEAL CAPSULE, DELAYED RELEASE ORAL ONCE A DAY
Status: ACTIVE | COMMUNITY

## 2022-09-21 RX ORDER — LOSARTAN POTASSIUM 50 MG/1
1 TABLET TABLET ORAL ONCE A DAY
Status: ACTIVE | COMMUNITY

## 2022-09-21 RX ORDER — OMEPRAZOLE 40 MG/1
1 CAPSULE 30 MINUTES BEFORE MORNING MEAL AND DINNER CAPSULE, DELAYED RELEASE ORAL TWICE A DAY
Qty: 180 CAPSULE | Refills: 2

## 2022-09-21 RX ORDER — OXYBUTYNIN CHLORIDE 5 MG/1
1 TABLET TABLET ORAL TWICE A DAY
Status: ACTIVE | COMMUNITY

## 2022-09-21 RX ORDER — CALCIUM CARBONATE/VITAMIN D3 500 MG-2.5
1 TABLET WITH A MEAL TABLET,CHEWABLE ORAL ONCE A DAY
Status: ACTIVE | COMMUNITY

## 2022-09-21 RX ORDER — MULTIVIT-MIN/IRON/FA/VIT K/LUT 4MG-200MCG
AS DIRECTED TABLET ORAL
Status: ACTIVE | COMMUNITY

## 2022-09-21 NOTE — HPI-TODAY'S VISIT:
Ms. Bowling is a pleasant 67-year-old female with a history of hypertension, diabetes, overactive bladder who was referred to the GI clinic by Mariam Bynum for evaluation of reflux disorder. . Patient states that she has had longstanding reflux greater than 15 years.  She has been on omeprazole once daily for the majority of this time with inadequate relief of her symptoms.  She complains of epigastric burning, radiating into the substernal area exacerbated by certain meals, symptoms are worse in the late afternoon. She does admit to intermittent solid food dysphagia.  Last endoscopy performed in 2001 in [place], this required esophageal dilation as well.  The report is unavailable for review, however patient states that there is no other remarkable findings. . No family history of stomach or colon cancer in any of her first-degree relatives. .  She denies unintentional weight loss, nausea/vomiting, rectal bleeding, diarrhea/constipation. . Last colonoscopy one month ago in [place] was unremarkable per patient.

## 2022-09-21 NOTE — PHYSICAL EXAM GASTROINTESTINAL
Abdomen , soft, prior surgical scar noted, tender to epigastric palpation, nondistended , no guarding or rigidity , no masses palpable , normal bowel sounds , Liver and Spleen,  no hepatosplenomegaly , liver nontender

## 2022-09-22 PROBLEM — 235595009: Status: ACTIVE | Noted: 2022-09-21

## 2022-09-29 ENCOUNTER — WEB ENCOUNTER (OUTPATIENT)
Dept: URBAN - METROPOLITAN AREA SURGERY CENTER 25 | Facility: SURGERY CENTER | Age: 67
End: 2022-09-29

## 2022-09-29 ENCOUNTER — CLAIMS CREATED FROM THE CLAIM WINDOW (OUTPATIENT)
Dept: URBAN - METROPOLITAN AREA CLINIC 4 | Facility: CLINIC | Age: 67
End: 2022-09-29
Payer: MEDICARE

## 2022-09-29 ENCOUNTER — OFFICE VISIT (OUTPATIENT)
Dept: URBAN - METROPOLITAN AREA SURGERY CENTER 25 | Facility: SURGERY CENTER | Age: 67
End: 2022-09-29
Payer: MEDICARE

## 2022-09-29 DIAGNOSIS — K31.89 REACTIVE GASTROPATHY: ICD-10-CM

## 2022-09-29 DIAGNOSIS — K31.89 OTHER DISEASES OF STOMACH AND DUODENUM: ICD-10-CM

## 2022-09-29 DIAGNOSIS — K21.9 GASTRO-ESOPHAGEAL REFLUX DISEASE WITHOUT ESOPHAGITIS: ICD-10-CM

## 2022-09-29 DIAGNOSIS — K29.70 GASTRITIS, UNSPECIFIED, WITHOUT BLEEDING: ICD-10-CM

## 2022-09-29 DIAGNOSIS — K21.9 GASTROESOPHAGEAL REFLUX DISEASE: ICD-10-CM

## 2022-09-29 PROCEDURE — 88305 TISSUE EXAM BY PATHOLOGIST: CPT | Performed by: PATHOLOGY

## 2022-09-29 PROCEDURE — G8907 PT DOC NO EVENTS ON DISCHARG: HCPCS | Performed by: STUDENT IN AN ORGANIZED HEALTH CARE EDUCATION/TRAINING PROGRAM

## 2022-09-29 PROCEDURE — 43239 EGD BIOPSY SINGLE/MULTIPLE: CPT | Performed by: STUDENT IN AN ORGANIZED HEALTH CARE EDUCATION/TRAINING PROGRAM

## 2022-09-29 PROCEDURE — 88312 SPECIAL STAINS GROUP 1: CPT | Performed by: PATHOLOGY

## 2022-10-13 ENCOUNTER — OFFICE VISIT (OUTPATIENT)
Dept: URBAN - METROPOLITAN AREA CLINIC 113 | Facility: CLINIC | Age: 67
End: 2022-10-13
Payer: MEDICARE

## 2022-10-13 ENCOUNTER — DASHBOARD ENCOUNTERS (OUTPATIENT)
Age: 67
End: 2022-10-13

## 2022-10-13 VITALS
RESPIRATION RATE: 20 BRPM | WEIGHT: 184.2 LBS | TEMPERATURE: 97.5 F | BODY MASS INDEX: 36.16 KG/M2 | HEART RATE: 79 BPM | SYSTOLIC BLOOD PRESSURE: 124 MMHG | DIASTOLIC BLOOD PRESSURE: 79 MMHG | HEIGHT: 60 IN

## 2022-10-13 DIAGNOSIS — K21.9 GASTROESOPHAGEAL REFLUX DISEASE WITHOUT ESOPHAGITIS: ICD-10-CM

## 2022-10-13 PROBLEM — 266435005: Status: ACTIVE | Noted: 2022-10-13

## 2022-10-13 PROCEDURE — 99213 OFFICE O/P EST LOW 20 MIN: CPT | Performed by: STUDENT IN AN ORGANIZED HEALTH CARE EDUCATION/TRAINING PROGRAM

## 2022-10-13 RX ORDER — OXYBUTYNIN CHLORIDE 5 MG/1
1 TABLET TABLET ORAL TWICE A DAY
Status: ACTIVE | COMMUNITY

## 2022-10-13 RX ORDER — DULAGLUTIDE 0.75 MG/.5ML
AS DIRECTED INJECTION, SOLUTION SUBCUTANEOUS
Status: ACTIVE | COMMUNITY

## 2022-10-13 RX ORDER — LORATADINE 10 MG/1
1 TABLET TABLET ORAL ONCE A DAY
Status: ACTIVE | COMMUNITY

## 2022-10-13 RX ORDER — TRAMADOL HYDROCHLORIDE 50 MG/1
1 TABLET AS NEEDED TABLET, FILM COATED ORAL
Status: ACTIVE | COMMUNITY

## 2022-10-13 RX ORDER — PRAVASTATIN SODIUM 20 MG/1
1 TABLET TABLET ORAL ONCE A DAY
Status: ACTIVE | COMMUNITY

## 2022-10-13 RX ORDER — MULTIVIT-MIN/IRON/FOLIC ACID/K 18-600-40
AS DIRECTED CAPSULE ORAL
Status: ACTIVE | COMMUNITY

## 2022-10-13 RX ORDER — LOSARTAN POTASSIUM 50 MG/1
1 TABLET TABLET ORAL ONCE A DAY
Status: ACTIVE | COMMUNITY

## 2022-10-13 RX ORDER — METFORMIN HYDROCHLORIDE 500 MG/1
1 TABLET WITH A MEAL TABLET, FILM COATED ORAL TWICE DAILY
Status: ACTIVE | COMMUNITY

## 2022-10-13 RX ORDER — ASCORBIC ACID/MULTIVIT-MIN 1000 MG
AS DIRECTED EFFERVESCENT POWDER IN PACKET ORAL
Status: ACTIVE | COMMUNITY

## 2022-10-13 RX ORDER — RABEPRAZOLE SODIUM 20 MG/1
1 TABLET TABLET, DELAYED RELEASE ORAL TWICE A DAY
Qty: 120 TABLET | Refills: 1 | OUTPATIENT
Start: 2022-10-13

## 2022-10-13 RX ORDER — MULTIVIT-MIN/IRON/FA/VIT K/LUT 4MG-200MCG
AS DIRECTED TABLET ORAL
Status: ACTIVE | COMMUNITY

## 2022-10-13 RX ORDER — OMEPRAZOLE 40 MG/1
1 CAPSULE 30 MINUTES BEFORE MORNING MEAL AND DINNER CAPSULE, DELAYED RELEASE ORAL TWICE A DAY
Qty: 180 CAPSULE | Refills: 2 | Status: ACTIVE | COMMUNITY

## 2022-10-13 RX ORDER — CALCIUM CARBONATE/VITAMIN D3 500 MG-2.5
1 TABLET WITH A MEAL TABLET,CHEWABLE ORAL ONCE A DAY
Status: ACTIVE | COMMUNITY

## 2022-10-13 RX ORDER — CHOLECALCIFEROL (VITAMIN D3) 50 MCG
1 CAPSULE TABLET ORAL ONCE A DAY
Status: ACTIVE | COMMUNITY

## 2022-10-13 NOTE — HPI-TODAY'S VISIT:
Ms. Bowling is a 67 year old female with GERD, HTN, HLD, DM who presents for follow up of GERD. . She had an EGD after last visit that was unremarkable by visual inspection. Biopsies of the esophagus and stomach are pending. She has been on PPI BID with omeprazole for 2 weeks now with no significant improvement. She had spaghetti sauce for dinner one evening and had substernal burning for > 24 hours. She does drink coffee occasionally. She denies NSAID use.

## 2022-10-14 ENCOUNTER — TELEPHONE ENCOUNTER (OUTPATIENT)
Dept: URBAN - METROPOLITAN AREA CLINIC 113 | Facility: CLINIC | Age: 67
End: 2022-10-14

## 2022-10-14 PROBLEM — 235595009 GASTROESOPHAGEAL REFLUX DISEASE: Status: ACTIVE | Noted: 2022-10-14

## 2022-10-14 RX ORDER — RABEPRAZOLE SODIUM 20 MG/1
1 TABLET TABLET, DELAYED RELEASE ORAL TWICE A DAY
Qty: 120 TABLET | Refills: 1
Start: 2022-10-13

## 2022-10-18 ENCOUNTER — TELEPHONE ENCOUNTER (OUTPATIENT)
Dept: URBAN - METROPOLITAN AREA CLINIC 113 | Facility: CLINIC | Age: 67
End: 2022-10-18

## 2022-11-08 NOTE — PROGRESS NOTES
Patient to bathroom but only made it snf and used a bsc. Back to bed. Bed alarm engaged. No further needs expressed. Call light within reach. Will continue to monitor. Abdominal Pain, N/V/D

## 2022-12-06 ENCOUNTER — OFFICE VISIT (OUTPATIENT)
Dept: URBAN - METROPOLITAN AREA CLINIC 113 | Facility: CLINIC | Age: 67
End: 2022-12-06

## 2022-12-13 ENCOUNTER — TELEPHONE ENCOUNTER (OUTPATIENT)
Dept: URBAN - METROPOLITAN AREA CLINIC 113 | Facility: CLINIC | Age: 67
End: 2022-12-13

## 2022-12-29 ENCOUNTER — TELEPHONE ENCOUNTER (OUTPATIENT)
Dept: URBAN - METROPOLITAN AREA CLINIC 113 | Facility: CLINIC | Age: 67
End: 2022-12-29

## 2022-12-29 RX ORDER — LANSOPRAZOLE 30 MG/1
1 CAPSULE BEFORE A MEAL CAPSULE, DELAYED RELEASE ORAL ONCE A DAY
Qty: 90 CAPSULE | Refills: 3 | OUTPATIENT

## 2023-10-02 ENCOUNTER — OFFICE VISIT (OUTPATIENT)
Dept: URBAN - METROPOLITAN AREA CLINIC 113 | Facility: CLINIC | Age: 68
End: 2023-10-02

## 2024-05-28 NOTE — ED NOTES
Follow up in 3 months    Continue current medications and therapy for chronic medical conditions.    Patient was advised importance of proper diet/nutrition in addition adequate hydration. Patient was encouraged moderate exercise program to include 30 minutes daily for 5 days of the week or 150 minutes weekly. Patient will follow-up with us as scheduled.    Obtain CMP and CBC     Start Amlodipine 2.5mg once daily    Start Zyrtec 10 mg nightly   Patient arrived via EMS in c-collar sitting up in bed.      Maribel Lyons RN  12/14/21 [id number]

## 2024-07-10 NOTE — PROGRESS NOTES
What Type Of Note Output Would You Prefer (Optional)?: Standard Output What Is The Reason For Today's Visit?: Full Body Skin Examination What Is The Reason For Today's Visit? (Being Monitored For X): the re-examination of lesions previously examined to light. Conjunctivae and EOM are normal.   Pulmonary/Chest: Effort normal.   Musculoskeletal:   Moderate crepitus of right knee. No erythema, effusion, skin breakdown, abnormal warmth of the knee joint. Neurological: She is alert and oriented to person, place, and time. No cranial nerve deficit. Skin: Skin is warm and dry. She is not diaphoretic. Psychiatric: She has a normal mood and affect. Her behavior is normal. Judgment and thought content normal.       Assessment/Plan    1. Right knee pain, unspecified chronicity  - sodium hyaluronate (viscosup) injection 20 mg  - Pt tolerated Euflexa injection #2 of 3 today w/o any immediate complications. Knee Injection Procedure Note  Pre-operative Diagnosis: right knee OA     Post-operative Diagnosis: same     Indications: Symptom relief from osteoarthritis     Anesthesia: 0.5% Marcaine     Procedure Details      Verbal consent was obtained for the procedure. The joint was prepped with Chloraprep and a 25 gauge needle was inserted into the lateral aspect of the joint from a lateral approach. 2 ml of 1% lidocaine with epinephrine was injected subcutaneously, then 2ml of euflexxa was injected into the joint. The needle was removed and the area cleansed and dressed.     Complications:  None; patient tolerated the procedure well. No follow-ups on file. How Severe Are Your Spot(S)?: mild

## (undated) DEVICE — MAJOR SET UP PK

## (undated) DEVICE — BLADE ES ELASTOMERIC COAT INSUL DURABLE BEND UPTO 90DEG

## (undated) DEVICE — BW-412T DISP COMBO CLEANING BRUSH: Brand: SINGLE USE COMBINATION CLEANING BRUSH

## (undated) DEVICE — 3M™ WARMING BLANKET, LOWER BODY, 10 PER CASE, 42568: Brand: BAIR HUGGER™

## (undated) DEVICE — SOLUTION IV IRRIG WATER 500ML POUR BRL ST 2F7113

## (undated) DEVICE — GOWN SIRUS NONREIN XL W/TWL: Brand: MEDLINE INDUSTRIES, INC.

## (undated) DEVICE — FORCEPS BX L240CM WRK CHN 2.8MM STD CAP W/ NDL MIC MESH

## (undated) DEVICE — SUTURE MCRYL SZ 4-0 L27IN ABSRB UD L19MM PS-2 1/2 CIR PRIM Y426H

## (undated) DEVICE — SUTURE VCRL SZ 3-0 L27IN ABSRB UD L26MM SH 1/2 CIR J416H

## (undated) DEVICE — CHLORAPREP 26ML ORANGE

## (undated) DEVICE — DRAIN SURG 15FR RND FULL FLUT

## (undated) DEVICE — GOWN SIRUS NONREIN LG W/TWL: Brand: MEDLINE INDUSTRIES, INC.

## (undated) DEVICE — SYRINGE MED 50ML LUERLOCK TIP

## (undated) DEVICE — PROCEDURE KIT ENDOSCP CUST

## (undated) DEVICE — INTENDED FOR TISSUE SEPARATION, AND OTHER PROCEDURES THAT REQUIRE A SHARP SURGICAL BLADE TO PUNCTURE OR CUT.: Brand: BARD-PARKER ® STAINLESS STEEL BLADES

## (undated) DEVICE — SPONGE LAP W18XL18IN WHT COT 4 PLY FLD STRUNG RADPQ DISP ST

## (undated) DEVICE — SYRINGE MED 10ML TRNSLUC BRL PLUNG BLK MRK POLYPR CTRL

## (undated) DEVICE — COVER US PRB W13XL244CM SURGICAL INTRAOPERATIVE W RUBBERBAND

## (undated) DEVICE — SPONGE,PEANUT,XRAY,ST,SM,3/8",5/CARD: Brand: MEDLINE INDUSTRIES, INC.

## (undated) DEVICE — Device

## (undated) DEVICE — PENCIL SMK EVAC L10FT TBNG NONSTICK ESU BLDE PLUMEPEN ELITE

## (undated) DEVICE — SKIN AFFIX SURG ADHESIVE 72/CS 0.55ML: Brand: MEDLINE

## (undated) DEVICE — APPLIER CLP L9.38IN M LIG TI DISP STR RNG HNDL LIGACLP

## (undated) DEVICE — GOWN AURORA NONREINF LG: Brand: MEDLINE INDUSTRIES, INC.

## (undated) DEVICE — MOUTHPIECE ENDOSCP L CTRL OPN AND SIDE PORTS DISP

## (undated) DEVICE — SNARE ENDOSCP L240CM SHTH DIA24MM LOOP W10MM POLYP RND REINF

## (undated) DEVICE — DRAPE,CHEST,FENES,15X10,STERIL: Brand: MEDLINE

## (undated) DEVICE — GLOVE SURG SZ 6.5 L11.2IN FNGR THK9.8MIL STRW LTX POLYMER

## (undated) DEVICE — SHEET,DRAPE,53X77,STERILE: Brand: MEDLINE

## (undated) DEVICE — STAPLER SKIN H3.9MM WIRE DIA0.58MM CRWN 6.9MM 35 STPL ROT

## (undated) DEVICE — YANKAUER,BULB TIP,W/O VENT,RIGID,STERILE: Brand: MEDLINE

## (undated) DEVICE — 3M™ IOBAN™ 2 ANTIMICROBIAL INCISE DRAPE 6650EZ: Brand: IOBAN™ 2

## (undated) DEVICE — TOTAL TRAY, DB, 100% SILI FOLEY, 16FR 10: Brand: MEDLINE

## (undated) DEVICE — ROOM TURNOVER KIT W/ ARM STRP

## (undated) DEVICE — TRAP SPEC RETRV CLR PLAS POLYP IN LN SUCT QUIK CTCH

## (undated) DEVICE — DRESSING GRMCDL 6 12FR D1N CNTR HOLE 4MM ANTMCRBL PRTCTVE DI

## (undated) DEVICE — AMD ANTIMICROBIAL DRAIN SPONGES, 6 PLY, 0.2% POLYHEXAMETHYLENE BIGUANIDE HCI (PHMB): Brand: EXCILON

## (undated) DEVICE — SET VLV 3 PC AWS DISPOSABLE GRDIAN SCOPEVALET

## (undated) DEVICE — GAUZE,SPONGE,4"X4",8PLY,STRL,LF,10/TRAY: Brand: MEDLINE

## (undated) DEVICE — SPECIMEN ORIENTATION CHARMS, SIX DISTINCTLY SHAPED STERILE 10MM CHARMS: Brand: MARGINMAP

## (undated) DEVICE — MEDI-VAC NON-CONDUCTIVE SUCTION TUBING: Brand: CARDINAL HEALTH

## (undated) DEVICE — HYPODERMIC SAFETY NEEDLE: Brand: MAGELLAN

## (undated) DEVICE — YANKAUER SUCTION INSTRUMENT NO CONTROL VENT, BULB TIP, CLEAR: Brand: YANKAUER

## (undated) DEVICE — SUTURE ETHLN SZ 3-0 L18IN NONABSORBABLE BLK PS-2 L19MM 3/8 1669H

## (undated) DEVICE — SUTURE VCRL SZ 3-0 L18IN ABSRB UD L26MM SH 1/2 CIR J864D